# Patient Record
Sex: FEMALE | Race: WHITE | NOT HISPANIC OR LATINO | Employment: OTHER | URBAN - METROPOLITAN AREA
[De-identification: names, ages, dates, MRNs, and addresses within clinical notes are randomized per-mention and may not be internally consistent; named-entity substitution may affect disease eponyms.]

---

## 2017-02-18 ENCOUNTER — APPOINTMENT (EMERGENCY)
Dept: RADIOLOGY | Facility: HOSPITAL | Age: 82
End: 2017-02-18
Payer: MEDICARE

## 2017-02-18 ENCOUNTER — HOSPITAL ENCOUNTER (EMERGENCY)
Facility: HOSPITAL | Age: 82
Discharge: HOME | End: 2017-02-18
Admitting: EMERGENCY MEDICINE
Payer: MEDICARE

## 2017-02-18 VITALS
DIASTOLIC BLOOD PRESSURE: 67 MMHG | WEIGHT: 169.53 LBS | RESPIRATION RATE: 18 BRPM | SYSTOLIC BLOOD PRESSURE: 143 MMHG | OXYGEN SATURATION: 97 % | TEMPERATURE: 97 F | HEART RATE: 60 BPM

## 2017-02-18 DIAGNOSIS — G89.29 CHRONIC PAIN OF RIGHT KNEE: Primary | ICD-10-CM

## 2017-02-18 DIAGNOSIS — M25.561 CHRONIC PAIN OF RIGHT KNEE: Primary | ICD-10-CM

## 2017-02-18 PROCEDURE — 99283 EMERGENCY DEPT VISIT LOW MDM: CPT

## 2017-02-18 PROCEDURE — 73560 X-RAY EXAM OF KNEE 1 OR 2: CPT

## 2017-02-18 RX ORDER — CLOPIDOGREL BISULFATE 75 MG/1
75 TABLET ORAL DAILY
COMMUNITY

## 2017-02-18 RX ORDER — TRAMADOL HYDROCHLORIDE 50 MG/1
50 TABLET ORAL EVERY 6 HOURS PRN
COMMUNITY
End: 2020-01-26 | Stop reason: ALTCHOICE

## 2017-02-18 RX ORDER — ATORVASTATIN CALCIUM 10 MG/1
10 TABLET, FILM COATED ORAL DAILY
COMMUNITY

## 2017-02-18 RX ORDER — TOLTERODINE 4 MG/1
4 CAPSULE, EXTENDED RELEASE ORAL DAILY
COMMUNITY

## 2017-02-18 RX ORDER — LISINOPRIL 20 MG/1
20 TABLET ORAL DAILY
COMMUNITY

## 2017-02-18 RX ORDER — OMEPRAZOLE 40 MG/1
40 CAPSULE, DELAYED RELEASE ORAL DAILY
COMMUNITY

## 2017-03-18 ENCOUNTER — HOSPITAL ENCOUNTER (EMERGENCY)
Facility: HOSPITAL | Age: 82
Discharge: HOME | End: 2017-03-18
Attending: EMERGENCY MEDICINE | Admitting: EMERGENCY MEDICINE
Payer: COMMERCIAL

## 2017-03-18 VITALS
HEIGHT: 62 IN | RESPIRATION RATE: 16 BRPM | HEART RATE: 60 BPM | TEMPERATURE: 98.4 F | WEIGHT: 165.79 LBS | OXYGEN SATURATION: 99 % | DIASTOLIC BLOOD PRESSURE: 71 MMHG | SYSTOLIC BLOOD PRESSURE: 159 MMHG | BODY MASS INDEX: 30.51 KG/M2

## 2017-03-18 DIAGNOSIS — M17.11 ARTHRITIS OF RIGHT KNEE: Primary | ICD-10-CM

## 2017-03-18 PROCEDURE — 99283 EMERGENCY DEPT VISIT LOW MDM: CPT

## 2017-03-18 PROCEDURE — 96372 THER/PROPH/DIAG INJ SC/IM: CPT

## 2017-03-18 RX ORDER — KETOROLAC TROMETHAMINE 30 MG/ML
15 INJECTION, SOLUTION INTRAMUSCULAR; INTRAVENOUS ONCE
Status: COMPLETED | OUTPATIENT
Start: 2017-03-18 | End: 2017-03-18

## 2017-03-18 RX ORDER — PREDNISONE 20 MG/1
60 TABLET ORAL ONCE
Status: COMPLETED | OUTPATIENT
Start: 2017-03-18 | End: 2017-03-18

## 2017-03-18 RX ORDER — PREDNISONE 20 MG/1
40 TABLET ORAL DAILY
Qty: 10 TABLET | Refills: 0 | Status: SHIPPED | OUTPATIENT
Start: 2017-03-18 | End: 2017-03-23

## 2017-03-18 RX ADMIN — PREDNISONE 60 MG: 20 TABLET ORAL at 10:40

## 2017-03-18 RX ADMIN — KETOROLAC TROMETHAMINE 15 MG: 30 INJECTION, SOLUTION INTRAMUSCULAR at 10:39

## 2017-05-17 ENCOUNTER — HOSPITAL ENCOUNTER (EMERGENCY)
Facility: HOSPITAL | Age: 82
Discharge: HOME | End: 2017-05-17
Attending: EMERGENCY MEDICINE
Payer: COMMERCIAL

## 2017-05-17 VITALS
SYSTOLIC BLOOD PRESSURE: 125 MMHG | HEART RATE: 62 BPM | BODY MASS INDEX: 30.55 KG/M2 | RESPIRATION RATE: 18 BRPM | TEMPERATURE: 97.6 F | DIASTOLIC BLOOD PRESSURE: 75 MMHG | HEIGHT: 62 IN | WEIGHT: 166 LBS | OXYGEN SATURATION: 98 %

## 2017-05-17 DIAGNOSIS — M17.11 ARTHRITIS OF RIGHT KNEE: Primary | ICD-10-CM

## 2017-05-17 PROCEDURE — 99283 EMERGENCY DEPT VISIT LOW MDM: CPT

## 2017-05-17 PROCEDURE — 96372 THER/PROPH/DIAG INJ SC/IM: CPT

## 2017-05-17 RX ORDER — NAPROXEN 500 MG/1
500 TABLET ORAL 2 TIMES DAILY WITH MEALS
Qty: 14 TABLET | Refills: 0 | Status: SHIPPED | OUTPATIENT
Start: 2017-05-17 | End: 2017-05-24

## 2017-05-17 RX ORDER — KETOROLAC TROMETHAMINE 30 MG/ML
15 INJECTION, SOLUTION INTRAMUSCULAR; INTRAVENOUS ONCE
Status: COMPLETED | OUTPATIENT
Start: 2017-05-17 | End: 2017-05-17

## 2017-05-17 RX ADMIN — KETOROLAC TROMETHAMINE 15 MG: 30 INJECTION, SOLUTION INTRAMUSCULAR at 12:12

## 2017-05-17 RX ADMIN — HYDROMORPHONE HYDROCHLORIDE 0.2 MG: 1 INJECTION, SOLUTION INTRAMUSCULAR; INTRAVENOUS; SUBCUTANEOUS at 12:13

## 2017-06-09 ENCOUNTER — ALLSCRIPTS OFFICE VISIT (OUTPATIENT)
Dept: OTHER | Facility: OTHER | Age: 82
End: 2017-06-09

## 2017-08-18 ENCOUNTER — HOSPITAL ENCOUNTER (EMERGENCY)
Facility: HOSPITAL | Age: 82
Discharge: HOME | End: 2017-08-18
Attending: EMERGENCY MEDICINE | Admitting: EMERGENCY MEDICINE
Payer: COMMERCIAL

## 2017-08-18 ENCOUNTER — APPOINTMENT (EMERGENCY)
Dept: CT IMAGING | Facility: HOSPITAL | Age: 82
End: 2017-08-18
Payer: COMMERCIAL

## 2017-08-18 VITALS
DIASTOLIC BLOOD PRESSURE: 91 MMHG | WEIGHT: 164.68 LBS | TEMPERATURE: 98.4 F | RESPIRATION RATE: 20 BRPM | SYSTOLIC BLOOD PRESSURE: 194 MMHG | BODY MASS INDEX: 30.12 KG/M2 | HEART RATE: 70 BPM | OXYGEN SATURATION: 95 %

## 2017-08-18 DIAGNOSIS — K80.20 CHOLELITHIASIS: ICD-10-CM

## 2017-08-18 DIAGNOSIS — R10.9 ABDOMINAL PAIN: Primary | ICD-10-CM

## 2017-08-18 DIAGNOSIS — N28.1 BILATERAL RENAL CYSTS: ICD-10-CM

## 2017-08-18 LAB
ALBUMIN SERPL BCP-MCNC: 3.4 G/DL (ref 3.5–5)
ALP SERPL-CCNC: 71 U/L (ref 46–116)
ALT SERPL W P-5'-P-CCNC: 17 U/L (ref 12–78)
ANION GAP SERPL CALCULATED.3IONS-SCNC: 9 MMOL/L (ref 4–13)
AST SERPL W P-5'-P-CCNC: 14 U/L (ref 5–45)
BACTERIA UR QL AUTO: ABNORMAL /HPF
BASOPHILS # BLD AUTO: 0.05 THOUSANDS/ΜL (ref 0–0.1)
BASOPHILS NFR BLD AUTO: 1 % (ref 0–1)
BILIRUB SERPL-MCNC: 0.2 MG/DL (ref 0.2–1)
BILIRUB UR QL STRIP: NEGATIVE
BUN SERPL-MCNC: 13 MG/DL (ref 5–25)
CALCIUM SERPL-MCNC: 9.7 MG/DL (ref 8.3–10.1)
CHLORIDE SERPL-SCNC: 106 MMOL/L (ref 100–108)
CLARITY UR: CLEAR
CLARITY, POC: CLEAR
CO2 SERPL-SCNC: 30 MMOL/L (ref 21–32)
COLOR UR: COLORLESS
COLOR, POC: CLEAR
CREAT SERPL-MCNC: 0.74 MG/DL (ref 0.6–1.3)
EOSINOPHIL # BLD AUTO: 0.06 THOUSAND/ΜL (ref 0–0.61)
EOSINOPHIL NFR BLD AUTO: 1 % (ref 0–6)
ERYTHROCYTE [DISTWIDTH] IN BLOOD BY AUTOMATED COUNT: 12.4 % (ref 11.6–15.1)
EXT BILIRUBIN, UA: NEGATIVE
EXT BLOOD URINE: NORMAL
EXT GLUCOSE, UA: NEGATIVE
EXT KETONES: NEGATIVE
EXT NITRITE, UA: NEGATIVE
EXT PH, UA: 8
EXT PROTEIN, UA: NEGATIVE
EXT SPECIFIC GRAVITY, UA: 1
EXT UROBILINOGEN: 0.2
GFR SERPL CREATININE-BSD FRML MDRD: 76 ML/MIN/1.73SQ M
GLUCOSE SERPL-MCNC: 98 MG/DL (ref 65–140)
GLUCOSE UR STRIP-MCNC: NEGATIVE MG/DL
HCT VFR BLD AUTO: 41.1 % (ref 34.8–46.1)
HGB BLD-MCNC: 13.5 G/DL (ref 11.5–15.4)
HGB UR QL STRIP.AUTO: ABNORMAL
KETONES UR STRIP-MCNC: NEGATIVE MG/DL
LEUKOCYTE ESTERASE UR QL STRIP: ABNORMAL
LIPASE SERPL-CCNC: 173 U/L (ref 73–393)
LYMPHOCYTES # BLD AUTO: 1.96 THOUSANDS/ΜL (ref 0.6–4.47)
LYMPHOCYTES NFR BLD AUTO: 25 % (ref 14–44)
MCH RBC QN AUTO: 32 PG (ref 26.8–34.3)
MCHC RBC AUTO-ENTMCNC: 32.8 G/DL (ref 31.4–37.4)
MCV RBC AUTO: 97 FL (ref 82–98)
MONOCYTES # BLD AUTO: 0.58 THOUSAND/ΜL (ref 0.17–1.22)
MONOCYTES NFR BLD AUTO: 7 % (ref 4–12)
NEUTROPHILS # BLD AUTO: 5.26 THOUSANDS/ΜL (ref 1.85–7.62)
NEUTS SEG NFR BLD AUTO: 66 % (ref 43–75)
NITRITE UR QL STRIP: NEGATIVE
NON-SQ EPI CELLS URNS QL MICRO: ABNORMAL /HPF
NRBC BLD AUTO-RTO: 0 /100 WBCS
PH UR STRIP.AUTO: 7.5 [PH] (ref 4.5–8)
PLATELET # BLD AUTO: 257 THOUSANDS/UL (ref 149–390)
PMV BLD AUTO: 10.1 FL (ref 8.9–12.7)
POTASSIUM SERPL-SCNC: 3.7 MMOL/L (ref 3.5–5.3)
PROT SERPL-MCNC: 7.7 G/DL (ref 6.4–8.2)
PROT UR STRIP-MCNC: NEGATIVE MG/DL
RBC # BLD AUTO: 4.22 MILLION/UL (ref 3.81–5.12)
RBC #/AREA URNS AUTO: ABNORMAL /HPF
SODIUM SERPL-SCNC: 145 MMOL/L (ref 136–145)
SP GR UR STRIP.AUTO: 1.01 (ref 1–1.03)
TROPONIN I SERPL-MCNC: <0.02 NG/ML
UROBILINOGEN UR QL STRIP.AUTO: 0.2 E.U./DL
WBC # BLD AUTO: 7.93 THOUSAND/UL (ref 4.31–10.16)
WBC # BLD EST: NORMAL 10*3/UL
WBC #/AREA URNS AUTO: ABNORMAL /HPF

## 2017-08-18 PROCEDURE — 81001 URINALYSIS AUTO W/SCOPE: CPT | Performed by: EMERGENCY MEDICINE

## 2017-08-18 PROCEDURE — 93005 ELECTROCARDIOGRAM TRACING: CPT | Performed by: EMERGENCY MEDICINE

## 2017-08-18 PROCEDURE — 85025 COMPLETE CBC W/AUTO DIFF WBC: CPT | Performed by: EMERGENCY MEDICINE

## 2017-08-18 PROCEDURE — 99284 EMERGENCY DEPT VISIT MOD MDM: CPT

## 2017-08-18 PROCEDURE — 80053 COMPREHEN METABOLIC PANEL: CPT | Performed by: EMERGENCY MEDICINE

## 2017-08-18 PROCEDURE — 83690 ASSAY OF LIPASE: CPT | Performed by: EMERGENCY MEDICINE

## 2017-08-18 PROCEDURE — 81002 URINALYSIS NONAUTO W/O SCOPE: CPT | Performed by: EMERGENCY MEDICINE

## 2017-08-18 PROCEDURE — 84484 ASSAY OF TROPONIN QUANT: CPT | Performed by: EMERGENCY MEDICINE

## 2017-08-18 PROCEDURE — 36415 COLL VENOUS BLD VENIPUNCTURE: CPT | Performed by: EMERGENCY MEDICINE

## 2017-08-18 PROCEDURE — 96361 HYDRATE IV INFUSION ADD-ON: CPT

## 2017-08-18 PROCEDURE — 96374 THER/PROPH/DIAG INJ IV PUSH: CPT

## 2017-08-18 PROCEDURE — 74177 CT ABD & PELVIS W/CONTRAST: CPT

## 2017-08-18 PROCEDURE — 96375 TX/PRO/DX INJ NEW DRUG ADDON: CPT

## 2017-08-18 RX ORDER — FENTANYL CITRATE 50 UG/ML
25 INJECTION, SOLUTION INTRAMUSCULAR; INTRAVENOUS ONCE
Status: COMPLETED | OUTPATIENT
Start: 2017-08-18 | End: 2017-08-18

## 2017-08-18 RX ORDER — ONDANSETRON 2 MG/ML
4 INJECTION INTRAMUSCULAR; INTRAVENOUS ONCE
Status: COMPLETED | OUTPATIENT
Start: 2017-08-18 | End: 2017-08-18

## 2017-08-18 RX ADMIN — ONDANSETRON 4 MG: 2 INJECTION INTRAMUSCULAR; INTRAVENOUS at 11:58

## 2017-08-18 RX ADMIN — IOHEXOL 100 ML: 350 INJECTION, SOLUTION INTRAVENOUS at 12:45

## 2017-08-18 RX ADMIN — FENTANYL CITRATE 25 MCG: 50 INJECTION INTRAMUSCULAR; INTRAVENOUS at 11:58

## 2017-08-18 RX ADMIN — SODIUM CHLORIDE 1000 ML: 0.9 INJECTION, SOLUTION INTRAVENOUS at 11:56

## 2017-08-22 LAB
ATRIAL RATE: 57 BPM
P AXIS: 1 DEGREES
PR INTERVAL: 130 MS
QRS AXIS: 12 DEGREES
QRSD INTERVAL: 76 MS
QT INTERVAL: 404 MS
QTC INTERVAL: 393 MS
T WAVE AXIS: 33 DEGREES
VENTRICULAR RATE: 57 BPM

## 2017-09-13 ENCOUNTER — TRANSCRIBE ORDERS (OUTPATIENT)
Dept: ADMINISTRATIVE | Facility: HOSPITAL | Age: 82
End: 2017-09-13

## 2017-09-13 DIAGNOSIS — N28.1 ACQUIRED CYST OF KIDNEY: Primary | ICD-10-CM

## 2017-09-25 ENCOUNTER — HOSPITAL ENCOUNTER (OUTPATIENT)
Dept: ULTRASOUND IMAGING | Facility: HOSPITAL | Age: 82
Discharge: HOME | End: 2017-09-25
Payer: COMMERCIAL

## 2017-09-25 DIAGNOSIS — N28.1 ACQUIRED CYST OF KIDNEY: ICD-10-CM

## 2017-09-25 PROCEDURE — 76770 US EXAM ABDO BACK WALL COMP: CPT

## 2017-12-29 ENCOUNTER — APPOINTMENT (EMERGENCY)
Dept: RADIOLOGY | Facility: HOSPITAL | Age: 82
End: 2017-12-29
Payer: COMMERCIAL

## 2017-12-29 ENCOUNTER — APPOINTMENT (EMERGENCY)
Dept: ULTRASOUND IMAGING | Facility: HOSPITAL | Age: 82
End: 2017-12-29
Payer: COMMERCIAL

## 2017-12-29 ENCOUNTER — HOSPITAL ENCOUNTER (EMERGENCY)
Facility: HOSPITAL | Age: 82
Discharge: HOME | End: 2017-12-29
Attending: EMERGENCY MEDICINE | Admitting: EMERGENCY MEDICINE
Payer: COMMERCIAL

## 2017-12-29 VITALS
SYSTOLIC BLOOD PRESSURE: 120 MMHG | HEIGHT: 62 IN | TEMPERATURE: 97 F | RESPIRATION RATE: 18 BRPM | OXYGEN SATURATION: 96 % | DIASTOLIC BLOOD PRESSURE: 55 MMHG | BODY MASS INDEX: 29.44 KG/M2 | HEART RATE: 70 BPM | WEIGHT: 160 LBS

## 2017-12-29 DIAGNOSIS — M25.569 KNEE PAIN: Primary | ICD-10-CM

## 2017-12-29 PROCEDURE — 99284 EMERGENCY DEPT VISIT MOD MDM: CPT

## 2017-12-29 PROCEDURE — 73502 X-RAY EXAM HIP UNI 2-3 VIEWS: CPT

## 2017-12-29 PROCEDURE — 93971 EXTREMITY STUDY: CPT

## 2017-12-29 PROCEDURE — 73564 X-RAY EXAM KNEE 4 OR MORE: CPT

## 2017-12-29 RX ORDER — ACETAMINOPHEN 325 MG/1
650 TABLET ORAL ONCE
Status: COMPLETED | OUTPATIENT
Start: 2017-12-29 | End: 2017-12-29

## 2017-12-29 RX ORDER — IBUPROFEN 400 MG/1
400 TABLET ORAL ONCE
Status: COMPLETED | OUTPATIENT
Start: 2017-12-29 | End: 2017-12-29

## 2017-12-29 RX ADMIN — ACETAMINOPHEN 650 MG: 325 TABLET ORAL at 14:13

## 2017-12-29 RX ADMIN — IBUPROFEN 400 MG: 400 TABLET, FILM COATED ORAL at 14:13

## 2017-12-29 NOTE — DISCHARGE INSTRUCTIONS
Make sure you call the orthopedic surgeon for follow-up  Knee Pain   WHAT YOU NEED TO KNOW:   Knee pain may start suddenly, or it may be a long-term problem  You may have pain on the side, front, or back of your knee  You may have knee stiffness and swelling  You may hear popping sounds or feel like your knee is giving way or locking up as you walk  You may feel pain when you sit, stand, walk, or climb up and down stairs  Knee pain can be caused by conditions such as obesity, inflammation, or strains or tears in ligaments or tendons  DISCHARGE INSTRUCTIONS:   Follow up with your healthcare provider within 24 hours or as directed: You may need follow-up treatments, such as steroid injections to decrease pain  Write down your questions so you remember to ask them during your visits  Self-care:   · Rest  your knee so it can heal  Limit activities that increase your pain  · Ice  can help reduce swelling  Wrap ice in a towel and put it on your knee for as long and as often as directed  · Compression  with a brace or bandage can help reduce swelling  Use a brace or bandage only as directed  · Elevation  helps decrease pain and swelling  Elevate your knee while you are sitting or lying down  Prop your leg on pillows to keep your knee above the level of your heart  Medicines:   · NSAIDs  help decrease swelling and pain or fever  This medicine is available with or without a doctor's order  NSAIDs can cause stomach bleeding or kidney problems in certain people  If you take blood thinner medicine, always ask your healthcare provider if NSAIDs are safe for you  Always read the medicine label and follow directions  · Acetaminophen  decreases pain and fever  It is available without a doctor's order  Ask how much to take and when to take it  Follow directions  Acetaminophen can cause liver damage if not taken correctly  · Take your medicine as directed    Contact your healthcare provider if you think your medicine is not helping or if you have side effects  Tell him or her if you are allergic to any medicine  Keep a list of the medicines, vitamins, and herbs you take  Include the amounts, and when and why you take them  Bring the list or the pill bottles to follow-up visits  Carry your medicine list with you in case of an emergency  Exercise as directed: You may need to see a physical therapist or do recommended exercises to improve movement and decrease your pain  You may be directed to walk, swim, or ride a bike  Follow your exercise plan exactly as directed to avoid further injury  Contact your healthcare provider if:   · You have questions or concerns about your condition or care  Return to the emergency department if:   · Your pain is worse, even after treatment  · You cannot bend or straighten your leg completely  · The swelling around your knee does not go down even with treatment  · Your knee is painful and hot to the touch  © 2017 [address] Information is for End User's use only and may not be sold, redistributed or otherwise used for commercial purposes  All illustrations and images included in CareNotes® are the copyrighted property of [employer]  or Jose Correa  The above information is an  only  It is not intended as medical advice for individual conditions or treatments  Talk to your doctor, nurse or pharmacist before following any medical regimen to see if it is safe and effective for you

## 2017-12-29 NOTE — ED PROVIDER NOTES
History  Chief Complaint   Patient presents with    Knee Pain     pt comes to ed for knee pain x 2 months  Pt states she has  had pain since her knee replacement two months ago and couldnt stand it anymore  Patient is an 67-year-old female here with has been who states for the past 2 months ever since her knee surgery she has been having the pain  Patient had a total knee replacement done in the [facility] area  Patient was on tramadol for her pain and took 1 last night  Patient has had no fevers, chest pain, shortness of breath,   Did not fall or hurt herself  Patient states the knee is just "painful"  Patient has followed up with the surgeon and has been complaining physical therapy  She has no back pain, hip pain, numbness or tingling down the leg  She has had no difficulty with eating or drinking  She has no urinary tension  History provided by:  Patient and significant other   used: No    Knee Pain   Location:  Knee  Time since incident:  2 months  Injury: no    Knee location:  R knee  Pain details:     Quality:  Cramping and aching    Radiates to:  Does not radiate    Severity:  Mild    Onset quality:  Gradual    Duration:  2 months    Timing:  Constant    Progression:  Waxing and waning  Chronicity:  Recurrent  Dislocation: no    Foreign body present:  No foreign bodies  Tetanus status:  Unknown  Prior injury to area:  No  Relieved by:  None tried  Exacerbated by: Movement  Ineffective treatments:  NSAIDs (Tramadol)  Associated symptoms: no back pain, no decreased ROM, no fatigue, no fever, no itching, no muscle weakness, no neck pain, no numbness, no stiffness, no swelling and no tingling    Risk factors: no concern for non-accidental trauma, no frequent fractures, no known bone disorder, no obesity and no recent illness        Prior to Admission Medications   Prescriptions Last Dose Informant Patient Reported?  Taking?   atorvastatin (LIPITOR) 10 mg tablet   Yes No Sig: Take 10 mg by mouth daily   clopidogrel (PLAVIX) 75 mg tablet   Yes No   Sig: Take 75 mg by mouth daily   lisinopril (ZESTRIL) 20 mg tablet   Yes No   Sig: Take 20 mg by mouth daily   naproxen (NAPROSYN) 500 mg tablet   No No   Sig: Take 1 tablet by mouth 2 (two) times a day with meals for 7 days   omeprazole (PriLOSEC) 40 MG capsule   Yes No   Sig: Take 40 mg by mouth daily   tolterodine (DETROL LA) 4 mg 24 hr capsule   Yes No   Sig: Take 4 mg by mouth daily   traMADol (ULTRAM) 50 mg tablet   Yes No   Sig: Take 50 mg by mouth every 6 (six) hours as needed for moderate pain      Facility-Administered Medications: None       Past Medical History:   Diagnosis Date    CHF (congestive heart failure) (HCC)     Hyperlipidemia     Hypertension     Knee pain     right       Past Surgical History:   Procedure Laterality Date    BACK SURGERY         History reviewed  No pertinent family history  I have reviewed and agree with the history as documented  Social History   Substance Use Topics    Smoking status: Never Smoker    Smokeless tobacco: Never Used    Alcohol use No        Review of Systems   Constitutional: Negative for fatigue and fever  Respiratory: Negative for chest tightness and shortness of breath  Cardiovascular: Negative for chest pain, palpitations and leg swelling  Musculoskeletal: Negative for arthralgias, back pain, myalgias, neck pain and stiffness  Skin: Negative for color change and itching         Physical Exam  ED Triage Vitals [12/29/17 1218]   Temperature Pulse Respirations Blood Pressure SpO2   (!) 97 °F (36 1 °C) 70 18 120/55 96 %      Temp Source Heart Rate Source Patient Position - Orthostatic VS BP Location FiO2 (%)   Temporal -- Sitting Right arm --      Pain Score       --           Orthostatic Vital Signs  Vitals:    12/29/17 1218   BP: 120/55   Pulse: 70   Patient Position - Orthostatic VS: Sitting       Physical Exam   Constitutional: She is oriented to person, place, and time  She appears well-developed and well-nourished  No distress  HENT:   Head: Normocephalic  Eyes: EOM are normal  Pupils are equal, round, and reactive to light  Neck: Normal range of motion  Neck supple  Pulmonary/Chest: No respiratory distress  Musculoskeletal:        Right knee: She exhibits normal range of motion, no swelling, no effusion, no ecchymosis, no deformity, no laceration, no erythema, normal alignment, no LCL laxity, normal patellar mobility, no bony tenderness, normal meniscus and no MCL laxity  Tenderness found  Medial joint line tenderness noted  No lateral joint line, no MCL, no LCL and no patellar tendon tenderness noted  Legs:  Patient has full active range of motion of the right knee, hip, and ankle  This was pain free  Patient has mild swelling to the right knee however there is no patellar suprapatellar edema noted  There is no effusions noted  Total knee replacement incision well-healing  I cannot reproduce patient knee discomfort/pain  Neurological: She is alert and oriented to person, place, and time  Skin: Skin is warm  Capillary refill takes less than 2 seconds  No rash noted  She is diaphoretic  No erythema  No pallor  Nursing note and vitals reviewed  ED Medications  Medications   acetaminophen (TYLENOL) tablet 650 mg (650 mg Oral Given 12/29/17 1413)   ibuprofen (MOTRIN) tablet 400 mg (400 mg Oral Given 12/29/17 1413)       Diagnostic Studies  Results Reviewed     None                 XR hip/pelv 2-3 vws right if performed   Final Result by Leonard Paz MD (12/29 8204)      No acute osseous abnormality  Workstation performed: [id number]         XR knee 4+ views RIGHT   Final Result by Leonard Paz MD (12/29 4780)   Unremarkable total right knee arthroplasty  No acute osseous abnormality           Workstation performed: [id number]         VAS lower limb venous duplex study, unilateral/limited    (Results Pending) Procedures  Procedures       Phone Contacts  ED Phone Contact    ED Course  ED Course                                MDM  Number of Diagnoses or Management Options  Knee pain: new and requires workup  Diagnosis management comments: Patient is an 67-year-old female with a history of hypertension hyperlipidemia CHF coming in today for persisted a pain after total knee replacement 2 months ago  Patient has not had reoccurred falls  She states that she did take a tramadol last night without any change in pain  Will obtain x-rays of hip and knee as well as a vascular study given recent surgery  On physical exam patient does not have an effusion and has full active range of motion    3:19 PM  US negative for DVT   Patient and  wished to leave as he cannot drive in the dark  Review of x-rays on my read show no acute pathology however they are where the radiologist not is not red these  He still wished to leave  Patient and family asking for something stronger than tramadol  I discussed with him that this is a narcotic and will need to follow up with Orthopedic surgery as her pain started approximately 2 months ago after the surgery  Patient has no clinical signs and symptoms of septic joint, hemorrhagic contusion  She has no DVT, no fractures on my read  Patient is neurovascular intact and will need close follow-up  Amount and/or Complexity of Data Reviewed  Tests in the radiology section of CPT®: ordered and reviewed  Independent visualization of images, tracings, or specimens: yes (Per ultrasound tech negative DVT    Per x-rays on my read no acute pathology )      CritCare Time    Disposition  Final diagnoses:   Knee pain     Time reflects when diagnosis was documented in both MDM as applicable and the Disposition within this note     Time User Action Codes Description Comment    12/29/2017  3:21 PM Neil Michael Add [F73 [id number]] Knee pain       ED Disposition     ED Disposition Condition Comment    Discharge  Vinh Webster discharge to home/self care  Condition at discharge: Stable        Follow-up Information     Follow up With Specialties Details Why [address], [place]  Schedule an appointment as soon as possible for a visit  [address] [address] [facility] [address]  [phone]          Discharge Medication List as of 12/29/2017  3:23 PM      CONTINUE these medications which have NOT CHANGED    Details   atorvastatin (LIPITOR) 10 mg tablet Take 10 mg by mouth daily, Until Discontinued, Historical Med      clopidogrel (PLAVIX) 75 mg tablet Take 75 mg by mouth daily, Until Discontinued, Historical Med      lisinopril (ZESTRIL) 20 mg tablet Take 20 mg by mouth daily, Until Discontinued, Historical Med      naproxen (NAPROSYN) 500 mg tablet Take 1 tablet by mouth 2 (two) times a day with meals for 7 days, Starting 5/17/2017, Until Wed 5/24/17, Print      omeprazole (PriLOSEC) 40 MG capsule Take 40 mg by mouth daily, Until Discontinued, Historical Med      tolterodine (DETROL LA) 4 mg 24 hr capsule Take 4 mg by mouth daily, Until Discontinued, Historical Med      traMADol (ULTRAM) 50 mg tablet Take 50 mg by mouth every 6 (six) hours as needed for moderate pain, Until Discontinued, Historical Med           No discharge procedures on file      ED Provider  Electronically Signed by           Jim Zambrano DO  12/29/17 9763

## 2018-01-14 VITALS
HEIGHT: 62 IN | SYSTOLIC BLOOD PRESSURE: 127 MMHG | DIASTOLIC BLOOD PRESSURE: 78 MMHG | HEART RATE: 50 BPM | WEIGHT: 166.13 LBS | BODY MASS INDEX: 30.57 KG/M2

## 2018-05-16 ENCOUNTER — APPOINTMENT (EMERGENCY)
Dept: RADIOLOGY | Facility: HOSPITAL | Age: 83
End: 2018-05-16
Payer: COMMERCIAL

## 2018-05-16 ENCOUNTER — HOSPITAL ENCOUNTER (EMERGENCY)
Facility: HOSPITAL | Age: 83
Discharge: HOME | End: 2018-05-17
Attending: EMERGENCY MEDICINE
Payer: COMMERCIAL

## 2018-05-16 VITALS
RESPIRATION RATE: 16 BRPM | SYSTOLIC BLOOD PRESSURE: 173 MMHG | OXYGEN SATURATION: 98 % | DIASTOLIC BLOOD PRESSURE: 80 MMHG | HEART RATE: 63 BPM

## 2018-05-16 DIAGNOSIS — M25.511 RIGHT SHOULDER PAIN: Primary | ICD-10-CM

## 2018-05-16 LAB
ALBUMIN SERPL BCP-MCNC: 3.6 G/DL (ref 3.5–5)
ALP SERPL-CCNC: 81 U/L (ref 46–116)
ALT SERPL W P-5'-P-CCNC: 17 U/L (ref 12–78)
ANION GAP SERPL CALCULATED.3IONS-SCNC: 8 MMOL/L (ref 4–13)
APTT PPP: 29 SECONDS (ref 24–36)
AST SERPL W P-5'-P-CCNC: 18 U/L (ref 5–45)
BASOPHILS # BLD AUTO: 0.05 THOUSANDS/ΜL (ref 0–0.1)
BASOPHILS NFR BLD AUTO: 0 % (ref 0–1)
BILIRUB DIRECT SERPL-MCNC: 0.12 MG/DL (ref 0–0.2)
BILIRUB SERPL-MCNC: 0.6 MG/DL (ref 0.2–1)
BUN SERPL-MCNC: 19 MG/DL (ref 5–25)
CALCIUM SERPL-MCNC: 9.6 MG/DL (ref 8.3–10.1)
CHLORIDE SERPL-SCNC: 101 MMOL/L (ref 100–108)
CO2 SERPL-SCNC: 31 MMOL/L (ref 21–32)
CREAT SERPL-MCNC: 0.83 MG/DL (ref 0.6–1.3)
EOSINOPHIL # BLD AUTO: 0.04 THOUSAND/ΜL (ref 0–0.61)
EOSINOPHIL NFR BLD AUTO: 0 % (ref 0–6)
ERYTHROCYTE [DISTWIDTH] IN BLOOD BY AUTOMATED COUNT: 13 % (ref 11.6–15.1)
GFR SERPL CREATININE-BSD FRML MDRD: 66 ML/MIN/1.73SQ M
GLUCOSE SERPL-MCNC: 98 MG/DL (ref 65–140)
HCT VFR BLD AUTO: 40.3 % (ref 34.8–46.1)
HGB BLD-MCNC: 13.2 G/DL (ref 11.5–15.4)
IMM GRANULOCYTES # BLD AUTO: 0.06 THOUSAND/UL (ref 0–0.2)
IMM GRANULOCYTES NFR BLD AUTO: 1 % (ref 0–2)
INR PPP: 1.11 (ref 0.86–1.16)
LYMPHOCYTES # BLD AUTO: 2.61 THOUSANDS/ΜL (ref 0.6–4.47)
LYMPHOCYTES NFR BLD AUTO: 22 % (ref 14–44)
MCH RBC QN AUTO: 31.7 PG (ref 26.8–34.3)
MCHC RBC AUTO-ENTMCNC: 32.8 G/DL (ref 31.4–37.4)
MCV RBC AUTO: 97 FL (ref 82–98)
MONOCYTES # BLD AUTO: 0.94 THOUSAND/ΜL (ref 0.17–1.22)
MONOCYTES NFR BLD AUTO: 8 % (ref 4–12)
NEUTROPHILS # BLD AUTO: 7.99 THOUSANDS/ΜL (ref 1.85–7.62)
NEUTS SEG NFR BLD AUTO: 69 % (ref 43–75)
NRBC BLD AUTO-RTO: 0 /100 WBCS
PLATELET # BLD AUTO: 266 THOUSANDS/UL (ref 149–390)
PMV BLD AUTO: 10 FL (ref 8.9–12.7)
POTASSIUM SERPL-SCNC: 3.4 MMOL/L (ref 3.5–5.3)
PROT SERPL-MCNC: 7.9 G/DL (ref 6.4–8.2)
PROTHROMBIN TIME: 14.6 SECONDS (ref 11.8–14.2)
RBC # BLD AUTO: 4.16 MILLION/UL (ref 3.81–5.12)
SODIUM SERPL-SCNC: 140 MMOL/L (ref 136–145)
TROPONIN I SERPL-MCNC: <0.02 NG/ML
WBC # BLD AUTO: 11.69 THOUSAND/UL (ref 4.31–10.16)

## 2018-05-16 PROCEDURE — 85610 PROTHROMBIN TIME: CPT | Performed by: EMERGENCY MEDICINE

## 2018-05-16 PROCEDURE — 96361 HYDRATE IV INFUSION ADD-ON: CPT

## 2018-05-16 PROCEDURE — 36415 COLL VENOUS BLD VENIPUNCTURE: CPT | Performed by: EMERGENCY MEDICINE

## 2018-05-16 PROCEDURE — 84484 ASSAY OF TROPONIN QUANT: CPT | Performed by: EMERGENCY MEDICINE

## 2018-05-16 PROCEDURE — 73030 X-RAY EXAM OF SHOULDER: CPT

## 2018-05-16 PROCEDURE — 96374 THER/PROPH/DIAG INJ IV PUSH: CPT

## 2018-05-16 PROCEDURE — 93005 ELECTROCARDIOGRAM TRACING: CPT

## 2018-05-16 PROCEDURE — 71046 X-RAY EXAM CHEST 2 VIEWS: CPT

## 2018-05-16 PROCEDURE — 85730 THROMBOPLASTIN TIME PARTIAL: CPT | Performed by: EMERGENCY MEDICINE

## 2018-05-16 PROCEDURE — 80076 HEPATIC FUNCTION PANEL: CPT | Performed by: EMERGENCY MEDICINE

## 2018-05-16 PROCEDURE — 80048 BASIC METABOLIC PNL TOTAL CA: CPT | Performed by: EMERGENCY MEDICINE

## 2018-05-16 PROCEDURE — 85025 COMPLETE CBC W/AUTO DIFF WBC: CPT | Performed by: EMERGENCY MEDICINE

## 2018-05-16 RX ORDER — KETOROLAC TROMETHAMINE 30 MG/ML
15 INJECTION, SOLUTION INTRAMUSCULAR; INTRAVENOUS ONCE
Status: COMPLETED | OUTPATIENT
Start: 2018-05-16 | End: 2018-05-16

## 2018-05-16 RX ORDER — SODIUM CHLORIDE 9 MG/ML
125 INJECTION, SOLUTION INTRAVENOUS CONTINUOUS
Status: DISCONTINUED | OUTPATIENT
Start: 2018-05-16 | End: 2018-05-17 | Stop reason: HOSPADM

## 2018-05-16 RX ORDER — ASPIRIN 81 MG/1
324 TABLET, CHEWABLE ORAL ONCE
Status: COMPLETED | OUTPATIENT
Start: 2018-05-16 | End: 2018-05-16

## 2018-05-16 RX ORDER — NAPROXEN 500 MG/1
500 TABLET ORAL 2 TIMES DAILY WITH MEALS
Qty: 20 TABLET | Refills: 0 | Status: SHIPPED | OUTPATIENT
Start: 2018-05-16 | End: 2020-01-26 | Stop reason: ALTCHOICE

## 2018-05-16 RX ADMIN — KETOROLAC TROMETHAMINE 15 MG: 30 INJECTION, SOLUTION INTRAMUSCULAR at 19:03

## 2018-05-16 RX ADMIN — ASPIRIN 81 MG 324 MG: 81 TABLET ORAL at 19:03

## 2018-05-16 RX ADMIN — SODIUM CHLORIDE 125 ML/HR: 0.9 INJECTION, SOLUTION INTRAVENOUS at 19:08

## 2018-05-16 NOTE — ED PROVIDER NOTES
History  Chief Complaint   Patient presents with    Shoulder Pain     EMS stated that patient has had right shoulder pain since saturday, and right knee pain for 2 weeks     HPI   81-year-old white female with a chief complaint of right shoulder pain  Patient states she started with pain in her right shoulder blade after she got got home from mother's Day dinner  Patient denies any chest pain, shortness of breath, or pain down her arm  Patient denies any trauma patient denies lifting anything heavy  Patient has a history of hyperlipidemia and hypertension  Patient also has a history of CHF  Patient states pain is sharp and intermittent in nature  Patient has full range of motion in her shoulder and right arm  Prior to Admission Medications   Prescriptions Last Dose Informant Patient Reported? Taking?   atorvastatin (LIPITOR) 10 mg tablet   Yes No   Sig: Take 10 mg by mouth daily   clopidogrel (PLAVIX) 75 mg tablet   Yes No   Sig: Take 75 mg by mouth daily   lisinopril (ZESTRIL) 20 mg tablet   Yes No   Sig: Take 20 mg by mouth daily   naproxen (NAPROSYN) 500 mg tablet   No No   Sig: Take 1 tablet by mouth 2 (two) times a day with meals for 7 days   omeprazole (PriLOSEC) 40 MG capsule   Yes No   Sig: Take 40 mg by mouth daily   tolterodine (DETROL LA) 4 mg 24 hr capsule   Yes No   Sig: Take 4 mg by mouth daily   traMADol (ULTRAM) 50 mg tablet   Yes No   Sig: Take 50 mg by mouth every 6 (six) hours as needed for moderate pain      Facility-Administered Medications: None       Past Medical History:   Diagnosis Date    CHF (congestive heart failure) (HCC)     Hyperlipidemia     Hypertension     Knee pain     right       Past Surgical History:   Procedure Laterality Date    BACK SURGERY         History reviewed  No pertinent family history  I have reviewed and agree with the history as documented      Social History   Substance Use Topics    Smoking status: Never Smoker    Smokeless tobacco: Never Used  Alcohol use No        Review of Systems   Constitutional: Negative for chills, diaphoresis, fatigue and fever  HENT: Negative for congestion, ear pain, nosebleeds, rhinorrhea and sore throat  Eyes: Negative for photophobia, pain, discharge and visual disturbance  Respiratory: Negative for cough, choking, chest tightness, shortness of breath and wheezing  Cardiovascular: Negative for chest pain and palpitations  Gastrointestinal: Negative for abdominal distention, abdominal pain, diarrhea and vomiting  Endocrine: Negative for polydipsia and polyuria  Genitourinary: Negative for dysuria, flank pain, frequency and hematuria  Musculoskeletal: Positive for arthralgias and myalgias  Negative for back pain, gait problem, joint swelling and neck stiffness  Skin: Negative for color change, rash and wound  Neurological: Negative for dizziness, syncope and headaches  Psychiatric/Behavioral: Negative for behavioral problems, confusion and suicidal ideas  The patient is not nervous/anxious  All other systems reviewed and are negative  Physical Exam  ED Triage Vitals   Temp Pulse Respirations Blood Pressure SpO2   -- 05/16/18 1813 05/16/18 1813 05/16/18 1813 05/16/18 1813    78 20 145/85 100 %      Temp src Heart Rate Source Patient Position - Orthostatic VS BP Location FiO2 (%)   -- 05/16/18 2048 05/16/18 1813 05/16/18 1813 --    Monitor Lying Right arm       Pain Score       05/16/18 2048       No Pain           Orthostatic Vital Signs  Vitals:    05/16/18 1813 05/16/18 1815 05/16/18 2048   BP: 145/85 145/85 (!) 173/80   Pulse: 78  63   Patient Position - Orthostatic VS: Lying  Lying       Physical Exam   Constitutional: She is oriented to person, place, and time  She appears well-developed and well-nourished  67-year-old white female with a chief complaint of right posterior scapular pain and right shoulder pain  HENT:   Head: Normocephalic and atraumatic     Mouth/Throat: Oropharynx is clear and moist    Eyes: EOM are normal  Pupils are equal, round, and reactive to light  Neck: Normal range of motion  Neck supple  Cardiovascular: Normal rate, regular rhythm and normal heart sounds  Pulmonary/Chest: Effort normal and breath sounds normal  No respiratory distress  She has no wheezes  She exhibits no tenderness  Abdominal: Soft  Bowel sounds are normal  There is no tenderness  Musculoskeletal: Normal range of motion  Right shoulder: There is some tenderness to the right anterior shoulder however most of her tenderness is in the suprascapular region of the back in the trapezius region on palpation  Patient has full range of motion of her shoulder in flexion, extension, abduction, and adduction  There is no numbness or tingling down her right arm  Neurological: She is alert and oriented to person, place, and time  No cranial nerve deficit  She exhibits normal muscle tone  Coordination normal    Skin: Skin is warm and dry  Psychiatric: She has a normal mood and affect  Nursing note and vitals reviewed  ED Medications  Medications   sodium chloride 0 9 % infusion (125 mL/hr Intravenous New Bag 5/16/18 1908)   ketorolac (TORADOL) injection 15 mg (15 mg Intravenous Given 5/16/18 1903)   aspirin chewable tablet 324 mg (324 mg Oral Given 5/16/18 1903)       Diagnostic Studies  Results Reviewed     Procedure Component Value Units Date/Time    Troponin I [[id number]]  (Normal) Collected:  05/16/18 1844    Lab Status:  Final result Specimen:  Blood from Arm, Left Updated:  05/16/18 1908     Troponin I <0 02 ng/mL     Narrative:         [employer] Chemistry analyzer 99% cutoff is > 0 04 ng/mL in network labs    o cTnI 99% cutoff is useful only when applied to patients in the clinical setting of myocardial ischemia  o cTnI 99% cutoff should be interpreted in the context of clinical history, ECG findings and possibly cardiac imaging to establish correct diagnosis    o cTnI 99% cutoff may be suggestive but clearly not indicative of a coronary event without the clinical setting of myocardial ischemia  Basic metabolic panel [[id number]]  (Abnormal) Collected:  05/16/18 1844    Lab Status:  Final result Specimen:  Blood from Arm, Left Updated:  05/16/18 1905     Sodium 140 mmol/L      Potassium 3 4 (L) mmol/L      Chloride 101 mmol/L      CO2 31 mmol/L      Anion Gap 8 mmol/L      BUN 19 mg/dL      Creatinine 0 83 mg/dL      Glucose 98 mg/dL      Calcium 9 6 mg/dL      eGFR 66 ml/min/1 73sq m     Narrative:         National Kidney Disease Education Program recommendations are as follows:  GFR calculation is accurate only with a steady state creatinine  Chronic Kidney disease less than 60 ml/min/1 73 sq  meters  Kidney failure less than 15 ml/min/1 73 sq  meters      Hepatic function panel [[id number]]  (Normal) Collected:  05/16/18 1844    Lab Status:  Final result Specimen:  Blood from Arm, Left Updated:  05/16/18 1905     Total Bilirubin 0 60 mg/dL      Bilirubin, Direct 0 12 mg/dL      Alkaline Phosphatase 81 U/L      AST 18 U/L      ALT 17 U/L      Total Protein 7 9 g/dL      Albumin 3 6 g/dL     Protime-INR [[id number]]  (Abnormal) Collected:  05/16/18 1844    Lab Status:  Final result Specimen:  Blood from Arm, Left Updated:  05/16/18 1902     Protime 14 6 (H) seconds      INR 1 11    APTT [[id number]]  (Normal) Collected:  05/16/18 1844    Lab Status:  Final result Specimen:  Blood from Arm, Left Updated:  05/16/18 1902     PTT 29 seconds     CBC and differential [[id number]]  (Abnormal) Collected:  05/16/18 1844    Lab Status:  Final result Specimen:  Blood from Arm, Left Updated:  05/16/18 1849     WBC 11 69 (H) Thousand/uL      RBC 4 16 Million/uL      Hemoglobin 13 2 g/dL      Hematocrit 40 3 %      MCV 97 fL      MCH 31 7 pg      MCHC 32 8 g/dL      RDW 13 0 %      MPV 10 0 fL      Platelets 344 Thousands/uL      nRBC 0 /100 WBCs      Neutrophils Relative 69 %      Immat GRANS % 1 %      Lymphocytes Relative 22 %      Monocytes Relative 8 %      Eosinophils Relative 0 %      Basophils Relative 0 %      Neutrophils Absolute 7 99 (H) Thousands/µL      Immature Grans Absolute 0 06 Thousand/uL      Lymphocytes Absolute 2 61 Thousands/µL      Monocytes Absolute 0 94 Thousand/µL      Eosinophils Absolute 0 04 Thousand/µL      Basophils Absolute 0 05 Thousands/µL                  XR shoulder 2+ views RIGHT   ED Interpretation by Sharla Argueta DO (05/16 2016)      No acute osseous abnormality  Degenerative changes as described  Workstation performed: [id number]         Final Result by Jeffry Adler MD (05/16 1941)      No acute osseous abnormality  Degenerative changes as described  Workstation performed: [id number]         XR chest 2 views   ED Interpretation by Sharla Argueta DO (05/16 1901)   NAD      Final Result by Jeffry Adler MD (05/16 1940)      No acute cardiopulmonary disease  Workstation performed: [id number]                    Procedures  ECG 12 Lead Documentation  Date/Time: 5/16/2018 6:41 PM  Performed by: Fausto Mak by: Ian Waters     ECG reviewed by me, the ED Provider: yes    Patient location:  ED  Interpretation:     Interpretation: normal    Rate:     ECG rate assessment: bradycardic    Rhythm:     Rhythm: sinus rhythm    Ectopy:     Ectopy: none    ST segments:     ST segments:  Normal  T waves:     T waves: normal             Phone Contacts  ED Phone Contact    ED Course          patient states she was pain free with the IV Toradol  Patient had 2-troponins  I explained to patient that she should follow up with Orthopedics but I also told her to follow up with a cardiologist and gave her prescription for stress test since shoulder blade pain can be a vague symptom of cardiac disease  I placed patient on some Naprosyn and gave her follow-up  MDM  CritCare Time     Differential diagnosis includes:  1  Right shoulder pain  2  Right shoulder blade pain  3  Rule out cardiac disease  4  Musculoskeletal pain  5  Referred pain    Disposition  Final diagnoses:   Right shoulder pain     Time reflects when diagnosis was documented in both MDM as applicable and the Disposition within this note     Time User Action Codes Description Comment    5/16/2018  8:33 PM Mireya, [address] Right shoulder pain       ED Disposition     ED Disposition Condition Comment    Discharge  [address] discharge to home/self care  Condition at discharge: Good        Follow-up Information     Follow up With Specialties Details Why Zach Saravia MD  In 2 days  [facility]      Brayan Pichardo MD Cardiology, Radiology In 1 week  [facility] [address] [place] [address]      Bel Santana MD Orthopedic Surgery In 1 week  [address] [place] [address]          Patient's Medications   Discharge Prescriptions    NAPROXEN (NAPROSYN) 500 MG TABLET    Take 1 tablet (500 mg total) by mouth 2 (two) times a day with meals for 10 days       Start Date: 5/16/2018 End Date: 5/26/2018       Order Dose: 500 mg       Quantity: 20 tablet    Refills: 0       Outpatient Discharge Orders  NM myocardial perfusion spect (rx stress and/or rest)   Standing Status: Future  Standing Exp   Date: 05/16/22         ED Provider  Electronically Signed by           Linnea Bejarano DO  05/16/18 1503

## 2018-05-17 PROCEDURE — 99284 EMERGENCY DEPT VISIT MOD MDM: CPT

## 2018-05-17 NOTE — ED NOTES
Called [facility] for patient ride  [facility] unable to pick patient up  Will call when available        Samuel Ferrer RN  05/16/18 7518

## 2018-05-17 NOTE — DISCHARGE INSTRUCTIONS
Shoulder Pain, Ambulatory Care   GENERAL INFORMATION:   Shoulder pain  is a common problem and can affect your daily activities  Pain can be caused by a problem within your shoulder  Shoulder pain may also be caused by pain that spreads to your shoulder from another part of your body  Seek immediate care for the following symptoms:   · Severe pain    · Inability to move your arm or shoulder    · Numbness or tingling in your shoulder or arm  Treatment for shoulder pain  may include any of the following:  · Acetaminophen  decreases pain and fever  It is available without a doctor's order  Ask how much to take and how often to take it  Follow directions  Acetaminophen can cause liver damage if not taken correctly  · NSAIDs  help decrease swelling and pain or fever  This medicine is available with or without a doctor's order  NSAIDs can cause stomach bleeding or kidney problems in certain people  If you take blood thinner medicine, always ask your healthcare provider if NSAIDs are safe for you  Always read the medicine label and follow directions  · A steroid injection  may help decrease pain and swelling  · Surgery  may be needed for long-term pain and loss of function  Manage your symptoms:   · Apply ice  on your shoulder for 20 to 30 minutes every 2 hours or as directed  Use an ice pack, or put crushed ice in a plastic bag  Cover it with a towel  Ice helps prevent tissue damage and decreases swelling and pain  · Apply heat if ice does not help your symptoms  Apply heat on your shoulder for 20 to 30 minutes every 2 hours for as many days as directed  Heat helps decrease pain and muscle spasms  · Go to physical or occupational therapy as directed  A physical therapist teaches you exercises to help improve movement and strength, and to decrease pain  An occupational therapist teaches you skills to help with your daily activities  Prevent shoulder pain:   · Stretch and strengthen your shoulder  Use proper technique during exercises and sports  · Limit activities as directed  Try to avoid repeated overhead movements  Follow up with your healthcare provider or orthopedist as directed:  Write down your questions so you remember to ask them during your visits  CARE AGREEMENT:   You have the right to help plan your care  Learn about your health condition and how it may be treated  Discuss treatment options with your caregivers to decide what care you want to receive  You always have the right to refuse treatment  The above information is an  only  It is not intended as medical advice for individual conditions or treatments  Talk to your doctor, nurse or pharmacist before following any medical regimen to see if it is safe and effective for you  © 2014 [address] is for End User's use only and may not be sold, redistributed or otherwise used for commercial purposes  All illustrations and images included in CareNotes® are the copyrighted property of A D [employer] M [employer]  or Jose Correa  Coronary Artery Disease   AMBULATORY CARE:   Coronary artery disease (CAD)  is narrowing of the arteries to your heart caused by a buildup of plaque  Plaque is made up of cholesterol and other substances  The narrowing in your arteries decreases the amount of blood that can flow to your heart  This causes your heart to get less oxygen  You may not have any symptoms of CAD  It is important for you to manage CAD even if you feel well  CAD can lead to a heart attack if it is not managed         Common symptoms include the following:   · Chest pain (angina), causing burning, squeezing, or crushing tightness in your chest    · Pain that spreads to your neck, jaw, or shoulder blade    · Nausea, vomiting, sweating, fainting, and hands and feet that are cold to the touch  Call 911 for any of the following:   · You have any of the following signs of a heart attack: ¨ Squeezing, pressure, or pain in your chest that lasts longer than 5 minutes or returns    ¨ Discomfort or pain in your back, neck, jaw, stomach, or arm     ¨ Trouble breathing    ¨ Nausea or vomiting    ¨ Lightheadedness or a sudden cold sweat, especially with chest pain or trouble breathing    Contact your healthcare provider if:   · You have chest pain that is more frequent, or you have chest pain at rest     · You have questions or concerns about your condition or care  Medicines used to treat CAD:   · Blood pressure medicines  are given to lower your blood pressure  ACE inhibitors help keep your blood vessels relaxed and open to help keep blood flowing into your heart  Beta-blockers keep your heart pumping strongly and regularly so it does not work too hard to get oxygen  · Cholesterol medicines  help lower blood cholesterol levels  · Nitrates , such as nitroglycerin, relax the arteries of your heart so it gets more oxygen  They help to relieve your chest pain  · Antiplatelets , such as aspirin, help prevent blood clots  Take your antiplatelet medicine exactly as directed  These medicines make it more likely for you to bleed or bruise  If you are told to take aspirin, do not take acetaminophen or ibuprofen instead  · Blood thinners  keep clots from forming in your blood  Clots may cause heart attacks, strokes, or death  This medicine makes it more likely for you to bleed or bruise  · Do not take certain medicines without asking your healthcare provider first   These include NSAIDs, herbal or vitamin supplements, or hormones (estrogen or progestin)  Procedures used to treat CAD:   · Angioplasty  may be done to open an artery blocked by plaque  A tube with a balloon on the end is threaded into the blocked artery  Once the tube is in the artery, the balloon is inflated  As the balloon inflates, it presses the plaque against the artery wall to open the artery   A stent may be placed in your artery to keep it open  · Coronary artery bypass graft surgery (CABG)  is open heart surgery  Healthcare providers take arteries or veins from other areas in your body and use them to bypass or go around the blocked arteries of your heart  Cardiac rehabilitation:  Your healthcare provider may recommend that you attend cardiac rehabilitation (rehab)  This is a program run by specialists who will help you safely strengthen your heart and reduce the risk for more heart disease  The plan includes exercise, relaxation, stress management, and heart-healthy nutrition  Healthcare providers will also check to make sure any medicines you are taking are working  Manage CAD to prevent a heart attack:   · Do not smoke  Nicotine and other chemicals in cigarettes and cigars can cause heart and lung damage  Ask your healthcare provider for information if you currently smoke and need help to quit  E-cigarettes or smokeless tobacco still contain nicotine  Talk to your healthcare provider before you use these products  · Exercise regularly  Exercise at least 30 minutes each day, on most days of the week  Exercise helps to lower high cholesterol and high blood pressure  It can also help you maintain a healthy weight  Ask your healthcare provider about the kind of exercise you should do and how to get started  · Maintain a healthy weight  If you are overweight, talk to your healthcare provider about how to lose weight  A weight loss of 10% can improve your heart health  · Eat heart-healthy foods  Include fresh fruits and vegetables in your meal plan  Choose low-fat foods, such as skim or 1% fat milk, low-fat cheese and yogurt, fish, chicken (without skin), and lean meats  Eat two 4-ounce servings of fish high in omega-3 fats each week, such as salmon, fresh tuna, and herring  Do not eat foods that are high in sodium, such as canned foods, potato chips, salty snacks, and cold cuts  Put less table salt on your food  · Limit or do not drink alcohol  A drink of alcohol is 12 ounces of beer, 5 ounces of wine, or 1½ ounces of liquor  · Manage other health conditions  Follow your healthcare provider's advice on how to manage other conditions that can affect your heart health  These include diabetes, high blood pressure, and high cholesterol  You may need to take medicines for these conditions and make other lifestyle changes  · Ask if you should have a flu vaccine  The flu can be dangerous for a person who has CAD  The flu vaccine is available every year in the fall  Follow up with your healthcare provider as directed: You may need to return for other tests  You may also be referred to a cardiac surgeon  Write down your questions so you remember to ask them during your visits  © 2017 [address] Information is for End User's use only and may not be sold, redistributed or otherwise used for commercial purposes  All illustrations and images included in CareNotes® are the copyrighted property of A D A M , Inc  or Jose Correa  The above information is an  only  It is not intended as medical advice for individual conditions or treatments  Talk to your doctor, nurse or pharmacist before following any medical regimen to see if it is safe and effective for you

## 2018-05-18 LAB
ATRIAL RATE: 74 BPM
P AXIS: 15 DEGREES
PR INTERVAL: 126 MS
QRS AXIS: 35 DEGREES
QRSD INTERVAL: 76 MS
QT INTERVAL: 358 MS
QTC INTERVAL: 397 MS
T WAVE AXIS: 56 DEGREES
VENTRICULAR RATE: 74 BPM

## 2018-05-18 PROCEDURE — 93010 ELECTROCARDIOGRAM REPORT: CPT | Performed by: INTERNAL MEDICINE

## 2018-08-21 ENCOUNTER — APPOINTMENT (EMERGENCY)
Dept: RADIOLOGY | Facility: HOSPITAL | Age: 83
End: 2018-08-21
Payer: COMMERCIAL

## 2018-08-21 ENCOUNTER — HOSPITAL ENCOUNTER (EMERGENCY)
Facility: HOSPITAL | Age: 83
Discharge: HOME | End: 2018-08-21
Attending: EMERGENCY MEDICINE | Admitting: EMERGENCY MEDICINE
Payer: COMMERCIAL

## 2018-08-21 VITALS
SYSTOLIC BLOOD PRESSURE: 161 MMHG | WEIGHT: 162.48 LBS | BODY MASS INDEX: 29.9 KG/M2 | HEART RATE: 66 BPM | HEIGHT: 62 IN | RESPIRATION RATE: 18 BRPM | DIASTOLIC BLOOD PRESSURE: 69 MMHG | TEMPERATURE: 97.6 F | OXYGEN SATURATION: 99 %

## 2018-08-21 DIAGNOSIS — M25.569 KNEE PAIN: Primary | ICD-10-CM

## 2018-08-21 PROCEDURE — 73564 X-RAY EXAM KNEE 4 OR MORE: CPT

## 2018-08-21 PROCEDURE — 99283 EMERGENCY DEPT VISIT LOW MDM: CPT

## 2018-08-21 NOTE — DISCHARGE INSTRUCTIONS
Arthralgia   WHAT YOU NEED TO KNOW:   Arthralgia is pain in one or more joints, with no inflammation  It may be short-term and get better within 6 to 8 weeks  Arthralgia can be an early sign of arthritis  Arthralgia may be caused by a medical condition, such as a hormone disorder or a tumor  It may also be caused by an infection or injury  DISCHARGE INSTRUCTIONS:   Medicines: The following medicines may  be ordered for you:  · Acetaminophen  decreases pain  Ask how much to take and how often to take it  Follow directions  Acetaminophen can cause liver damage if not taken correctly  · NSAIDs  decrease pain and prevent swelling  Ask your healthcare provider which medicine is right for you  Ask how much to take and when to take it  Take as directed  NSAIDs can cause stomach bleeding and kidney problems if not taken correctly  · Pain relief cream  decreases pain  Use this cream as directed  · Take your medicine as directed  Contact your healthcare provider if you think your medicine is not helping or if you have side effects  Tell him of her if you are allergic to any medicine  Keep a list of the medicines, vitamins, and herbs you take  Include the amounts, and when and why you take them  Bring the list or the pill bottles to follow-up visits  Carry your medicine list with you in case of an emergency  Follow up with your healthcare provider or specialist as directed:  Write down your questions so you remember to ask them during your visits  Self-care:   · Apply heat  to help decrease pain  Use a heating pad or heat wrap  Apply heat for 20 to 30 minutes every 2 hours for as many days as directed  · Rest  as much as possible  Avoid activities that cause joint pain  · Apply ice  to help decrease swelling and pain  Ice may also help prevent tissue damage  Use an ice pack, or put crushed ice in a plastic bag   Cover it with a towel and place it on your painful joint for 15 to 20 minutes every hour or as directed  · Support  the joint with a brace or elastic wrap as directed  · Elevate  your joint above the level of your heart as often as you can to help decrease swelling and pain  Prop your painful joint on pillows or blankets to keep it elevated comfortably  · Lose weight  if you are overweight  Extra weight can put pressure on your joints and cause more pain  Ask your healthcare provider how much you should weigh  Ask him to help you create a weight loss plan  · Exercise  regularly to help improve joint movement and to decrease pain  Ask about the best exercise plan for you  Low-impact exercises can help take the pressure off your joints  Examples are walking, swimming, and water aerobics  Physical therapy:  A physical therapist teaches you exercises to help improve movement and strength, and to decrease pain  Ask your healthcare provider if physical therapy is right for you  Contact your healthcare provider or specialist if:   · You have a fever  · You continue to have joint pain that cannot be relieved with heat, ice, or medicine  · You have pain and inflammation around your joint  · You have questions or concerns about your condition or care  Return to the emergency department if:   · You have sudden, severe pain when you move your joint  · You have a fever and shaking chills  · You cannot move your joint  · You lose feeling on the side of your body where you have the painful joint  © 2017 [address]  Information is for End User's use only and may not be sold, redistributed or otherwise used for commercial purposes  All illustrations and images included in CareNotes® are the copyrighted property of A [employer] M , Inc  or Jose Correa  The above information is an  only  It is not intended as medical advice for individual conditions or treatments   Talk to your doctor, nurse or pharmacist before following any medical regimen to see if it is safe and effective for you

## 2018-08-21 NOTE — ED PROVIDER NOTES
History  Chief Complaint   Patient presents with    Knee Pain     Patient  states his wife had left knee replacement surgery 8 months ago and continues to have increasinging pain  Per patient  he was unable to sleep last night due to his wife screaming in pain from her knee  67-year-old female patient presents emergency department for evaluation of right knee pain  Patient is [place] speaking only, her  is providing interpretation for her  Patient had knee replacement done 10 months ago, she has had knee pain since that time  The patient has had no recent traumas  There is no overlying cellulitis or signs of infection  Patient have an x-ray done and then will likely require follow-up with her surgeon  The family was hoping that the patient could be brought here to have an MRI of the knee  History provided by:  Spouse and patient   used: Yes    Knee Pain   Location:  Knee  Injury: no    Knee location:  R knee  Pain details:     Quality:  Aching    Severity:  Mild    Onset quality:  Gradual    Timing:  Constant    Progression:  Worsening  Chronicity:  New  Dislocation: no    Foreign body present:  No foreign bodies  Relieved by:  Nothing  Worsened by:  Nothing  Ineffective treatments:  None tried  Associated symptoms: decreased ROM and swelling    Associated symptoms: no fatigue    Risk factors: no known bone disorder        Prior to Admission Medications   Prescriptions Last Dose Informant Patient Reported?  Taking?   atorvastatin (LIPITOR) 10 mg tablet   Yes No   Sig: Take 10 mg by mouth daily   clopidogrel (PLAVIX) 75 mg tablet   Yes No   Sig: Take 75 mg by mouth daily   lisinopril (ZESTRIL) 20 mg tablet   Yes No   Sig: Take 20 mg by mouth daily   naproxen (NAPROSYN) 500 mg tablet   No No   Sig: Take 1 tablet (500 mg total) by mouth 2 (two) times a day with meals for 10 days   omeprazole (PriLOSEC) 40 MG capsule   Yes No   Sig: Take 40 mg by mouth daily tolterodine (DETROL LA) 4 mg 24 hr capsule   Yes No   Sig: Take 4 mg by mouth daily   traMADol (ULTRAM) 50 mg tablet   Yes No   Sig: Take 50 mg by mouth every 6 (six) hours as needed for moderate pain      Facility-Administered Medications: None       Past Medical History:   Diagnosis Date    CHF (congestive heart failure) (HCC)     Hyperlipidemia     Hypertension     Knee pain     right       Past Surgical History:   Procedure Laterality Date    BACK SURGERY         History reviewed  No pertinent family history  I have reviewed and agree with the history as documented  Social History   Substance Use Topics    Smoking status: Never Smoker    Smokeless tobacco: Never Used    Alcohol use No        Review of Systems   Constitutional: Negative for fatigue  All other systems reviewed and are negative  Physical Exam  Physical Exam   Constitutional: She is oriented to person, place, and time  She appears well-developed and well-nourished  HENT:   Head: Normocephalic and atraumatic  Right Ear: External ear normal    Left Ear: External ear normal    Eyes: Conjunctivae and EOM are normal    Neck: No JVD present  No tracheal deviation present  No thyromegaly present  Cardiovascular: Normal rate  Pulmonary/Chest: Effort normal and breath sounds normal  No stridor  Abdominal: Soft  She exhibits no distension and no mass  There is no tenderness  There is no guarding  No hernia  Musculoskeletal: Normal range of motion  She exhibits no edema, tenderness or deformity  Lymphadenopathy:     She has no cervical adenopathy  Neurological: She is alert and oriented to person, place, and time  Skin: Skin is warm  No rash noted  No erythema  No pallor  Psychiatric: She has a normal mood and affect  Her behavior is normal    Nursing note and vitals reviewed        Vital Signs  ED Triage Vitals   Temperature Pulse Respirations Blood Pressure SpO2   08/21/18 1153 08/21/18 1153 08/21/18 1153 08/21/18 1154 08/21/18 1153   97 6 °F (36 4 °C) 66 18 161/69 99 %      Temp Source Heart Rate Source Patient Position - Orthostatic VS BP Location FiO2 (%)   08/21/18 1153 08/21/18 1153 08/21/18 1154 08/21/18 1154 --   Oral Monitor Sitting Right arm       Pain Score       --                  Vitals:    08/21/18 1153 08/21/18 1154   BP:  161/69   Pulse: 66    Patient Position - Orthostatic VS:  Sitting       Visual Acuity      ED Medications  Medications - No data to display    Diagnostic Studies  Results Reviewed     None                 XR knee 4+ vw right injury   Final Result by Marcella Gonzalez MD (08/21 1310)      No acute osseous abnormality  Workstation performed: [id number]                    Procedures  Procedures       Phone Contacts  ED Phone Contact    ED Course           Identification of Seniors at Risk      Most Recent Value   (ISAR) Identification of Seniors at Risk   Before the illness or injury that brought you to the Emergency, did you need someone to help you on a regular basis? 0 Filed at: 08/21/2018 1155   In the last 24 hours, have you needed more help than usual?  0 Filed at: 08/21/2018 1155   Have you been hospitalized for one or more nights during the past 6 months? 0 Filed at: 08/21/2018 1155   In general, do you see well?  0 Filed at: 08/21/2018 1155   In general, do you have serious problems with your memory? 0 Filed at: 08/21/2018 1155   Do you take more than three different medications every day?   1 Filed at: 08/21/2018 1155   ISAR Score  1 Filed at: 08/21/2018 1155                          MDM  Number of Diagnoses or Management Options  Knee pain: new and requires workup     Amount and/or Complexity of Data Reviewed  Tests in the radiology section of CPT®: ordered and reviewed  Decide to obtain previous medical records or to obtain history from someone other than the patient: yes  Review and summarize past medical records: yes    Patient Progress  Patient progress: stable    CritCare Time    Disposition  Final diagnoses:   Knee pain     Time reflects when diagnosis was documented in both MDM as applicable and the Disposition within this note     Time User Action Codes Description Comment    8/21/2018  1:25 PM Natalee Riddhi Overton [[id number]] Knee pain       ED Disposition     ED Disposition Condition Comment    Discharge  [place] discharge to home/self care  Condition at discharge: Stable        Follow-up Information     Follow up With Specialties Details Why Batsheva Funez MD Family Medicine   [address]  [address]  [facility] [address]  [phone]            Discharge Medication List as of 8/21/2018  1:26 PM      CONTINUE these medications which have NOT CHANGED    Details   atorvastatin (LIPITOR) 10 mg tablet Take 10 mg by mouth daily, Until Discontinued, Historical Med      clopidogrel (PLAVIX) 75 mg tablet Take 75 mg by mouth daily, Until Discontinued, Historical Med      lisinopril (ZESTRIL) 20 mg tablet Take 20 mg by mouth daily, Until Discontinued, Historical Med      naproxen (NAPROSYN) 500 mg tablet Take 1 tablet (500 mg total) by mouth 2 (two) times a day with meals for 10 days, Starting Wed 5/16/2018, Until Sat 5/26/2018, Print      omeprazole (PriLOSEC) 40 MG capsule Take 40 mg by mouth daily, Until Discontinued, Historical Med      tolterodine (DETROL LA) 4 mg 24 hr capsule Take 4 mg by mouth daily, Until Discontinued, Historical Med      traMADol (ULTRAM) 50 mg tablet Take 50 mg by mouth every 6 (six) hours as needed for moderate pain, Until Discontinued, Historical Med           No discharge procedures on file      ED Provider  Electronically Signed by           Atiya Mon DO  08/22/18 0745

## 2019-03-18 ENCOUNTER — HOSPITAL ENCOUNTER (EMERGENCY)
Facility: HOSPITAL | Age: 84
Discharge: HOME | End: 2019-03-18
Attending: EMERGENCY MEDICINE | Admitting: EMERGENCY MEDICINE
Payer: COMMERCIAL

## 2019-03-18 ENCOUNTER — APPOINTMENT (EMERGENCY)
Dept: CT IMAGING | Facility: HOSPITAL | Age: 84
End: 2019-03-18
Payer: COMMERCIAL

## 2019-03-18 ENCOUNTER — APPOINTMENT (EMERGENCY)
Dept: RADIOLOGY | Facility: HOSPITAL | Age: 84
End: 2019-03-18
Payer: COMMERCIAL

## 2019-03-18 VITALS
DIASTOLIC BLOOD PRESSURE: 66 MMHG | SYSTOLIC BLOOD PRESSURE: 152 MMHG | HEART RATE: 58 BPM | WEIGHT: 170.19 LBS | RESPIRATION RATE: 16 BRPM | OXYGEN SATURATION: 99 % | BODY MASS INDEX: 29.06 KG/M2 | HEIGHT: 64 IN | TEMPERATURE: 97.9 F

## 2019-03-18 DIAGNOSIS — R00.2 PALPITATIONS: Primary | ICD-10-CM

## 2019-03-18 LAB
ALBUMIN SERPL BCP-MCNC: 3.4 G/DL (ref 3.5–5)
ALP SERPL-CCNC: 82 U/L (ref 46–116)
ALT SERPL W P-5'-P-CCNC: 24 U/L (ref 12–78)
ANION GAP SERPL CALCULATED.3IONS-SCNC: 9 MMOL/L (ref 4–13)
AST SERPL W P-5'-P-CCNC: 38 U/L (ref 5–45)
ATRIAL RATE: 64 BPM
BASOPHILS # BLD AUTO: 0.04 THOUSANDS/ΜL (ref 0–0.1)
BASOPHILS NFR BLD AUTO: 1 % (ref 0–1)
BILIRUB SERPL-MCNC: 0.7 MG/DL (ref 0.2–1)
BUN SERPL-MCNC: 20 MG/DL (ref 5–25)
CALCIUM SERPL-MCNC: 9.4 MG/DL (ref 8.3–10.1)
CHLORIDE SERPL-SCNC: 106 MMOL/L (ref 100–108)
CO2 SERPL-SCNC: 27 MMOL/L (ref 21–32)
CREAT SERPL-MCNC: 0.89 MG/DL (ref 0.6–1.3)
EOSINOPHIL # BLD AUTO: 0.03 THOUSAND/ΜL (ref 0–0.61)
EOSINOPHIL NFR BLD AUTO: 0 % (ref 0–6)
ERYTHROCYTE [DISTWIDTH] IN BLOOD BY AUTOMATED COUNT: 12.8 % (ref 11.6–15.1)
GFR SERPL CREATININE-BSD FRML MDRD: 61 ML/MIN/1.73SQ M
GLUCOSE SERPL-MCNC: 93 MG/DL (ref 65–140)
HCT VFR BLD AUTO: 44.7 % (ref 34.8–46.1)
HGB BLD-MCNC: 14.6 G/DL (ref 11.5–15.4)
IMM GRANULOCYTES # BLD AUTO: 0.05 THOUSAND/UL (ref 0–0.2)
IMM GRANULOCYTES NFR BLD AUTO: 1 % (ref 0–2)
LYMPHOCYTES # BLD AUTO: 1.15 THOUSANDS/ΜL (ref 0.6–4.47)
LYMPHOCYTES NFR BLD AUTO: 15 % (ref 14–44)
MCH RBC QN AUTO: 32.3 PG (ref 26.8–34.3)
MCHC RBC AUTO-ENTMCNC: 32.7 G/DL (ref 31.4–37.4)
MCV RBC AUTO: 99 FL (ref 82–98)
MONOCYTES # BLD AUTO: 0.27 THOUSAND/ΜL (ref 0.17–1.22)
MONOCYTES NFR BLD AUTO: 4 % (ref 4–12)
NEUTROPHILS # BLD AUTO: 6.04 THOUSANDS/ΜL (ref 1.85–7.62)
NEUTS SEG NFR BLD AUTO: 79 % (ref 43–75)
NRBC BLD AUTO-RTO: 0 /100 WBCS
NT-PROBNP SERPL-MCNC: 1281 PG/ML
P AXIS: -1 DEGREES
PLATELET # BLD AUTO: 300 THOUSANDS/UL (ref 149–390)
PMV BLD AUTO: 10.3 FL (ref 8.9–12.7)
POTASSIUM SERPL-SCNC: 5.7 MMOL/L (ref 3.5–5.3)
PR INTERVAL: 126 MS
PROT SERPL-MCNC: 7.5 G/DL (ref 6.4–8.2)
QRS AXIS: 36 DEGREES
QRSD INTERVAL: 74 MS
QT INTERVAL: 386 MS
QTC INTERVAL: 398 MS
RBC # BLD AUTO: 4.52 MILLION/UL (ref 3.81–5.12)
SODIUM SERPL-SCNC: 142 MMOL/L (ref 136–145)
T WAVE AXIS: 58 DEGREES
TROPONIN I SERPL-MCNC: <0.02 NG/ML
VENTRICULAR RATE: 64 BPM
WBC # BLD AUTO: 7.58 THOUSAND/UL (ref 4.31–10.16)

## 2019-03-18 PROCEDURE — 70450 CT HEAD/BRAIN W/O DYE: CPT

## 2019-03-18 PROCEDURE — 80053 COMPREHEN METABOLIC PANEL: CPT | Performed by: EMERGENCY MEDICINE

## 2019-03-18 PROCEDURE — 93010 ELECTROCARDIOGRAM REPORT: CPT | Performed by: INTERNAL MEDICINE

## 2019-03-18 PROCEDURE — 36415 COLL VENOUS BLD VENIPUNCTURE: CPT | Performed by: EMERGENCY MEDICINE

## 2019-03-18 PROCEDURE — 85025 COMPLETE CBC W/AUTO DIFF WBC: CPT | Performed by: EMERGENCY MEDICINE

## 2019-03-18 PROCEDURE — 93005 ELECTROCARDIOGRAM TRACING: CPT

## 2019-03-18 PROCEDURE — 96360 HYDRATION IV INFUSION INIT: CPT

## 2019-03-18 PROCEDURE — 84484 ASSAY OF TROPONIN QUANT: CPT | Performed by: EMERGENCY MEDICINE

## 2019-03-18 PROCEDURE — 99285 EMERGENCY DEPT VISIT HI MDM: CPT

## 2019-03-18 PROCEDURE — 83880 ASSAY OF NATRIURETIC PEPTIDE: CPT | Performed by: EMERGENCY MEDICINE

## 2019-03-18 PROCEDURE — 71046 X-RAY EXAM CHEST 2 VIEWS: CPT

## 2019-03-18 PROCEDURE — 96361 HYDRATE IV INFUSION ADD-ON: CPT

## 2019-03-18 RX ADMIN — SODIUM CHLORIDE 500 ML: 0.9 INJECTION, SOLUTION INTRAVENOUS at 14:17

## 2019-03-19 NOTE — ED PROVIDER NOTES
History  Chief Complaint   Patient presents with    Palpitations     pt reports new onset of dizziness starting two days ago along with generalized weakness     81 yr old female patient presents emergency department for evaluation of dizziness  The patient describes dizziness as vertiginous in its nature and something that she has had previously  She states it has been getting worse, however, and she came in for evaluation today  EKG was done at 1232 hours, shows a ventricular rate of 64, with premature atrial contractions, and no other obvious abnormalities  Patient be evaluated with a differential diagnosis to include but not be limited to urinary tract infection, pneumonia, viral illness  History provided by:  Patient   used: No    Palpitations   Palpitations quality:  Regular  Onset quality:  Gradual  Timing:  Intermittent  Chronicity:  New  Context: not anxiety, not blood loss, not bronchodilators, not dehydration, not exercise and not nicotine    Relieved by:  Nothing  Worsened by:  Nothing  Ineffective treatments:  None tried  Associated symptoms: no diaphoresis, no dizziness, no lower extremity edema, no malaise/fatigue, no near-syncope and no PND        None       Past Medical History:   Diagnosis Date    CHF [facility]congestive heart failure) ([facility] [address] )     Hyperlipidemia     Hypertension     Knee pain     right       Past Surgical History:   Procedure Laterality Date    BACK SURGERY      KNEE SURGERY         History reviewed  No pertinent family history  I have reviewed and agree with the history as documented  Social History     Tobacco Use    Smoking status: Never Smoker    Smokeless tobacco: Never Used   Substance Use Topics    Alcohol use: Never     Frequency: Never    Drug use: Never        Review of Systems   Constitutional: Negative for diaphoresis and malaise/fatigue  Cardiovascular: Negative for PND and near-syncope  Neurological: Negative for dizziness  All other systems reviewed and are negative  Physical Exam  Physical Exam   Constitutional: She is oriented to person, place, and time  She appears well-developed and well-nourished  HENT:   Head: Normocephalic and atraumatic  Right Ear: External ear normal    Left Ear: External ear normal    Eyes: Conjunctivae and EOM are normal    Neck: No JVD present  No tracheal deviation present  No thyromegaly present  Cardiovascular: Normal rate  Pulmonary/Chest: Effort normal and breath sounds normal  No stridor  Abdominal: Soft  She exhibits no distension and no mass  There is no tenderness  There is no guarding  No hernia  Musculoskeletal: Normal range of motion  She exhibits no edema, tenderness or deformity  Lymphadenopathy:     She has no cervical adenopathy  Neurological: She is alert and oriented to person, place, and time  Skin: Skin is warm  No rash noted  No erythema  No pallor  Psychiatric: She has a normal mood and affect  Her behavior is normal    Nursing note and vitals reviewed        Vital Signs  ED Triage Vitals [03/18/19 1230]   Temperature Pulse Respirations Blood Pressure SpO2   97 9 °F (36 6 °C) 57 16 137/65 100 %      Temp Source Heart Rate Source Patient Position - Orthostatic VS BP Location FiO2 (%)   Oral Monitor Lying Right arm --      Pain Score       No Pain           Vitals:    03/18/19 1230 03/18/19 1407 03/18/19 1534   BP: 137/65 128/60 152/66   Pulse: 57 56 58   Patient Position - Orthostatic VS: Lying Lying Lying         Visual Acuity      ED Medications  Medications   sodium chloride 0 9 % bolus 500 mL (0 mL Intravenous Stopped 3/18/19 1652)       Diagnostic Studies  Results Reviewed     Procedure Component Value Units Date/Time    NT-BNP PRO (BNP for [facility], [facility], [place], [place], [place], [facility], [facility], [place] campuses) [[id number]]  (Abnormal) Collected:  03/18/19 1434    Lab Status:  Final result Specimen:  Blood from Arm, Left Updated:  03/18/19 1507     NT-proBNP 1,281 pg/mL Comprehensive metabolic panel [[id number]]  (Abnormal) Collected:  03/18/19 1434    Lab Status:  Final result Specimen:  Blood from Arm, Left Updated:  03/18/19 1502     Sodium 142 mmol/L      Potassium 5 7 mmol/L      Chloride 106 mmol/L      CO2 27 mmol/L      ANION GAP 9 mmol/L      BUN 20 mg/dL      Creatinine 0 89 mg/dL      Glucose 93 mg/dL      Calcium 9 4 mg/dL      AST 38 U/L      ALT 24 U/L      Alkaline Phosphatase 82 U/L      Total Protein 7 5 g/dL      Albumin 3 4 g/dL      Total Bilirubin 0 70 mg/dL      eGFR 61 ml/min/1 73sq m     Narrative:       National Kidney Disease Education Program recommendations are as follows:  GFR calculation is accurate only with a steady state creatinine  Chronic Kidney disease less than 60 ml/min/1 73 sq  meters  Kidney failure less than 15 ml/min/1 73 sq  meters  Troponin I [[id number]]  (Normal) Collected:  03/18/19 1258    Lab Status:  Final result Specimen:  Blood from Arm, Right Updated:  03/18/19 1331     Troponin I <0 02 ng/mL     CBC and differential [[id number]]  (Abnormal) Collected:  03/18/19 1258    Lab Status:  Final result Specimen:  Blood from Arm, Right Updated:  03/18/19 1308     WBC 7 58 Thousand/uL      RBC 4 52 Million/uL      Hemoglobin 14 6 g/dL      Hematocrit 44 7 %      MCV 99 fL      MCH 32 3 pg      MCHC 32 7 g/dL      RDW 12 8 %      MPV 10 3 fL      Platelets 389 Thousands/uL      nRBC 0 /100 WBCs      Neutrophils Relative 79 %      Immat GRANS % 1 %      Lymphocytes Relative 15 %      Monocytes Relative 4 %      Eosinophils Relative 0 %      Basophils Relative 1 %      Neutrophils Absolute 6 04 Thousands/µL      Immature Grans Absolute 0 05 Thousand/uL      Lymphocytes Absolute 1 15 Thousands/µL      Monocytes Absolute 0 27 Thousand/µL      Eosinophils Absolute 0 03 Thousand/µL      Basophils Absolute 0 04 Thousands/µL                  CT head without contrast   Final Result by Wood Pineda MD (03/18 1552)      1    No acute intracranial abnormality  Microangiopathic changes  2   Diffuse small parenchymal calcification, 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 0 Filed at: 03/18/2019 1238   In the last 24 hours, have you needed more help than usual?  0 Filed at: 03/18/2019 1238   Have you been hospitalized for one or more nights during the past 6 months? 0 Filed at: 03/18/2019 1238   In general, do you see well?  0 Filed at: 03/18/2019 1238   In general, do you have serious problems with your memory? 0 Filed at: 03/18/2019 1238   Do you take more than three different medications every day?   1 Filed at: 03/18/2019 1238   ISAR Score  1 Filed at: 03/18/2019 1238                          MDM  Number of Diagnoses or Management Options  Palpitations: new and requires workup     Amount and/or Complexity of Data Reviewed  Clinical lab tests: ordered and reviewed  Tests in the radiology section of CPT®: ordered and reviewed  Decide to obtain previous medical records or to obtain history from someone other than the patient: yes  Review and summarize past medical records: yes    Patient Progress  Patient progress: stable      Disposition  Final diagnoses:   Palpitations     Time reflects when diagnosis was documented in both MDM as applicable and the Disposition within this note     Time User Action Codes Description Comment    3/18/2019  4:19 PM Wendy Husain Add [R00 2] Palpitations       ED Disposition     ED Disposition Condition Date/Time Comment    Discharge Stable Mon Mar 18, 2019  4:19 PM Bárbara Agudeloer discharge to home/self care  Follow-up Information     Follow up With Specialties Details Why Carin Vega MD Family Medicine   [address]  [address]  [facility] [address] [place] [address]  N  [phone]            There are no discharge medications for this patient  No discharge procedures on file      ED Provider  Electronically Signed by           Manoj Sheehan DO  03/19/19 0847

## 2019-04-22 ENCOUNTER — APPOINTMENT (EMERGENCY)
Dept: RADIOLOGY | Facility: HOSPITAL | Age: 84
End: 2019-04-22
Payer: COMMERCIAL

## 2019-04-22 ENCOUNTER — HOSPITAL ENCOUNTER (EMERGENCY)
Facility: HOSPITAL | Age: 84
Discharge: HOME | End: 2019-04-22
Attending: EMERGENCY MEDICINE | Admitting: EMERGENCY MEDICINE
Payer: COMMERCIAL

## 2019-04-22 VITALS
RESPIRATION RATE: 18 BRPM | OXYGEN SATURATION: 98 % | DIASTOLIC BLOOD PRESSURE: 65 MMHG | HEART RATE: 55 BPM | WEIGHT: 167.33 LBS | BODY MASS INDEX: 28.57 KG/M2 | SYSTOLIC BLOOD PRESSURE: 144 MMHG | TEMPERATURE: 97.7 F | HEIGHT: 64 IN

## 2019-04-22 DIAGNOSIS — R07.9 CHEST PAIN: ICD-10-CM

## 2019-04-22 DIAGNOSIS — R53.1 GENERALIZED WEAKNESS: ICD-10-CM

## 2019-04-22 DIAGNOSIS — R42 LIGHTHEADEDNESS: ICD-10-CM

## 2019-04-22 DIAGNOSIS — M25.561 RIGHT KNEE PAIN: Primary | ICD-10-CM

## 2019-04-22 LAB
ALBUMIN SERPL BCP-MCNC: 3.3 G/DL (ref 3.5–5)
ALP SERPL-CCNC: 76 U/L (ref 46–116)
ALT SERPL W P-5'-P-CCNC: 17 U/L (ref 12–78)
ANION GAP SERPL CALCULATED.3IONS-SCNC: 10 MMOL/L (ref 4–13)
AST SERPL W P-5'-P-CCNC: 14 U/L (ref 5–45)
ATRIAL RATE: 53 BPM
BACTERIA UR QL AUTO: ABNORMAL /HPF
BASOPHILS # BLD AUTO: 0.06 THOUSANDS/ΜL (ref 0–0.1)
BASOPHILS NFR BLD AUTO: 1 % (ref 0–1)
BILIRUB SERPL-MCNC: 0.3 MG/DL (ref 0.2–1)
BILIRUB UR QL STRIP: NEGATIVE
BUN SERPL-MCNC: 44 MG/DL (ref 5–25)
CALCIUM SERPL-MCNC: 8.5 MG/DL (ref 8.3–10.1)
CHLORIDE SERPL-SCNC: 109 MMOL/L (ref 100–108)
CLARITY UR: CLEAR
CO2 SERPL-SCNC: 26 MMOL/L (ref 21–32)
COLOR UR: YELLOW
CREAT SERPL-MCNC: 1.15 MG/DL (ref 0.6–1.3)
DEPRECATED D DIMER PPP: 530 NG/ML (FEU)
EOSINOPHIL # BLD AUTO: 0.12 THOUSAND/ΜL (ref 0–0.61)
EOSINOPHIL NFR BLD AUTO: 1 % (ref 0–6)
ERYTHROCYTE [DISTWIDTH] IN BLOOD BY AUTOMATED COUNT: 12.8 % (ref 11.6–15.1)
GFR SERPL CREATININE-BSD FRML MDRD: 44 ML/MIN/1.73SQ M
GLUCOSE SERPL-MCNC: 92 MG/DL (ref 65–140)
GLUCOSE UR STRIP-MCNC: NEGATIVE MG/DL
HCT VFR BLD AUTO: 38.4 % (ref 34.8–46.1)
HGB BLD-MCNC: 12.5 G/DL (ref 11.5–15.4)
HGB UR QL STRIP.AUTO: ABNORMAL
IMM GRANULOCYTES # BLD AUTO: 0.03 THOUSAND/UL (ref 0–0.2)
IMM GRANULOCYTES NFR BLD AUTO: 0 % (ref 0–2)
KETONES UR STRIP-MCNC: NEGATIVE MG/DL
LEUKOCYTE ESTERASE UR QL STRIP: NEGATIVE
LYMPHOCYTES # BLD AUTO: 3.21 THOUSANDS/ΜL (ref 0.6–4.47)
LYMPHOCYTES NFR BLD AUTO: 36 % (ref 14–44)
MCH RBC QN AUTO: 32.5 PG (ref 26.8–34.3)
MCHC RBC AUTO-ENTMCNC: 32.6 G/DL (ref 31.4–37.4)
MCV RBC AUTO: 100 FL (ref 82–98)
MONOCYTES # BLD AUTO: 0.73 THOUSAND/ΜL (ref 0.17–1.22)
MONOCYTES NFR BLD AUTO: 8 % (ref 4–12)
NEUTROPHILS # BLD AUTO: 4.68 THOUSANDS/ΜL (ref 1.85–7.62)
NEUTS SEG NFR BLD AUTO: 54 % (ref 43–75)
NITRITE UR QL STRIP: NEGATIVE
NON-SQ EPI CELLS URNS QL MICRO: ABNORMAL /HPF
NRBC BLD AUTO-RTO: 0 /100 WBCS
NT-PROBNP SERPL-MCNC: 199 PG/ML
P AXIS: 73 DEGREES
PH UR STRIP.AUTO: 6 [PH]
PLATELET # BLD AUTO: 230 THOUSANDS/UL (ref 149–390)
PMV BLD AUTO: 10.1 FL (ref 8.9–12.7)
POTASSIUM SERPL-SCNC: 3.5 MMOL/L (ref 3.5–5.3)
PR INTERVAL: 134 MS
PROT SERPL-MCNC: 6.6 G/DL (ref 6.4–8.2)
PROT UR STRIP-MCNC: NEGATIVE MG/DL
QRS AXIS: 22 DEGREES
QRSD INTERVAL: 76 MS
QT INTERVAL: 420 MS
QTC INTERVAL: 394 MS
RBC # BLD AUTO: 3.85 MILLION/UL (ref 3.81–5.12)
RBC #/AREA URNS AUTO: ABNORMAL /HPF
SODIUM SERPL-SCNC: 145 MMOL/L (ref 136–145)
SP GR UR STRIP.AUTO: 1.01 (ref 1–1.03)
T WAVE AXIS: 46 DEGREES
TROPONIN I SERPL-MCNC: <0.02 NG/ML
UROBILINOGEN UR QL STRIP.AUTO: 0.2 E.U./DL
VENTRICULAR RATE: 53 BPM
WBC # BLD AUTO: 8.83 THOUSAND/UL (ref 4.31–10.16)
WBC #/AREA URNS AUTO: ABNORMAL /HPF

## 2019-04-22 PROCEDURE — 85025 COMPLETE CBC W/AUTO DIFF WBC: CPT | Performed by: EMERGENCY MEDICINE

## 2019-04-22 PROCEDURE — 99283 EMERGENCY DEPT VISIT LOW MDM: CPT | Performed by: EMERGENCY MEDICINE

## 2019-04-22 PROCEDURE — 84484 ASSAY OF TROPONIN QUANT: CPT | Performed by: EMERGENCY MEDICINE

## 2019-04-22 PROCEDURE — 80053 COMPREHEN METABOLIC PANEL: CPT | Performed by: EMERGENCY MEDICINE

## 2019-04-22 PROCEDURE — 93010 ELECTROCARDIOGRAM REPORT: CPT | Performed by: INTERNAL MEDICINE

## 2019-04-22 PROCEDURE — 71046 X-RAY EXAM CHEST 2 VIEWS: CPT

## 2019-04-22 PROCEDURE — 36415 COLL VENOUS BLD VENIPUNCTURE: CPT | Performed by: EMERGENCY MEDICINE

## 2019-04-22 PROCEDURE — 93005 ELECTROCARDIOGRAM TRACING: CPT

## 2019-04-22 PROCEDURE — 83880 ASSAY OF NATRIURETIC PEPTIDE: CPT | Performed by: EMERGENCY MEDICINE

## 2019-04-22 PROCEDURE — 85379 FIBRIN DEGRADATION QUANT: CPT | Performed by: EMERGENCY MEDICINE

## 2019-04-22 PROCEDURE — 81001 URINALYSIS AUTO W/SCOPE: CPT | Performed by: EMERGENCY MEDICINE

## 2019-04-22 PROCEDURE — 99285 EMERGENCY DEPT VISIT HI MDM: CPT

## 2019-04-22 RX ORDER — ACETAMINOPHEN 325 MG/1
975 TABLET ORAL ONCE
Status: COMPLETED | OUTPATIENT
Start: 2019-04-22 | End: 2019-04-22

## 2019-04-22 RX ORDER — LIDOCAINE 50 MG/G
1 PATCH TOPICAL ONCE
Status: DISCONTINUED | OUTPATIENT
Start: 2019-04-22 | End: 2019-04-22 | Stop reason: HOSPADM

## 2019-04-22 RX ORDER — TRAMADOL HYDROCHLORIDE 50 MG/1
25 TABLET ORAL ONCE
Status: COMPLETED | OUTPATIENT
Start: 2019-04-22 | End: 2019-04-22

## 2019-04-22 RX ORDER — TRAMADOL HYDROCHLORIDE 50 MG/1
25 TABLET ORAL EVERY 6 HOURS PRN
Qty: 20 TABLET | Refills: 0 | Status: SHIPPED | OUTPATIENT
Start: 2019-04-22 | End: 2019-04-27

## 2019-04-22 RX ADMIN — TRAMADOL HYDROCHLORIDE 25 MG: 50 TABLET, COATED ORAL at 04:12

## 2019-04-22 RX ADMIN — ACETAMINOPHEN 975 MG: 325 TABLET, FILM COATED ORAL at 05:17

## 2019-04-22 RX ADMIN — LIDOCAINE 1 PATCH: 50 PATCH TOPICAL at 05:18

## 2019-05-01 ENCOUNTER — TELEPHONE (OUTPATIENT)
Dept: OBGYN CLINIC | Facility: HOSPITAL | Age: 84
End: 2019-05-01

## 2019-09-30 ENCOUNTER — APPOINTMENT (EMERGENCY)
Dept: RADIOLOGY | Facility: HOSPITAL | Age: 84
End: 2019-09-30
Payer: COMMERCIAL

## 2019-09-30 ENCOUNTER — HOSPITAL ENCOUNTER (EMERGENCY)
Facility: HOSPITAL | Age: 84
Discharge: HOME | End: 2019-09-30
Attending: EMERGENCY MEDICINE
Payer: COMMERCIAL

## 2019-09-30 VITALS
BODY MASS INDEX: 28.27 KG/M2 | OXYGEN SATURATION: 96 % | HEIGHT: 64 IN | SYSTOLIC BLOOD PRESSURE: 160 MMHG | TEMPERATURE: 98 F | HEART RATE: 60 BPM | WEIGHT: 165.57 LBS | RESPIRATION RATE: 18 BRPM | DIASTOLIC BLOOD PRESSURE: 74 MMHG

## 2019-09-30 DIAGNOSIS — M25.561 CHRONIC PAIN OF RIGHT KNEE: Primary | ICD-10-CM

## 2019-09-30 DIAGNOSIS — G89.29 CHRONIC PAIN OF RIGHT KNEE: Primary | ICD-10-CM

## 2019-09-30 PROCEDURE — 99284 EMERGENCY DEPT VISIT MOD MDM: CPT

## 2019-09-30 PROCEDURE — 73564 X-RAY EXAM KNEE 4 OR MORE: CPT

## 2019-09-30 PROCEDURE — 99284 EMERGENCY DEPT VISIT MOD MDM: CPT | Performed by: EMERGENCY MEDICINE

## 2019-09-30 RX ORDER — OXYCODONE HYDROCHLORIDE AND ACETAMINOPHEN 5; 325 MG/1; MG/1
1 TABLET ORAL EVERY 4 HOURS PRN
Qty: 20 TABLET | Refills: 0 | Status: SHIPPED | OUTPATIENT
Start: 2019-09-30 | End: 2020-01-26 | Stop reason: ALTCHOICE

## 2019-09-30 NOTE — ED PROVIDER NOTES
History  Chief Complaint   Patient presents with    Knee Pain     Patient brought in by EMS for chronic right knee pain; s/ right knee replacement 1year ago  Patient states "my only problem is my knee"  Denies chest pain, SOB, dizziness or palpitations  Denies any other problem  Pt comes to ED by ambulance for 1 5 years of constant aching moderate daily knee pain which is worse with ambulation and somewhat alleviated by rest  She denies fall or trauma  She denies fever  She denies weakness  She reports that she has had the pain since having her knee replaced and states that she takes tylenol without improvement in her sxs  She has not recently seen her orthopaedist for this pain  She denies hip pain and ankle/leg pain  She denies LLE pain  Prior to Admission Medications   Prescriptions Last Dose Informant Patient Reported? Taking?   atorvastatin (LIPITOR) 10 mg tablet   Yes No   Sig: Take 10 mg by mouth daily   clopidogrel (PLAVIX) 75 mg tablet   Yes No   Sig: Take 75 mg by mouth daily   lisinopril (ZESTRIL) 20 mg tablet   Yes No   Sig: Take 20 mg by mouth daily   naproxen (NAPROSYN) 500 mg tablet   No No   Sig: Take 1 tablet (500 mg total) by mouth 2 (two) times a day with meals for 10 days   omeprazole (PriLOSEC) 40 MG capsule   Yes No   Sig: Take 40 mg by mouth daily   tolterodine (DETROL LA) 4 mg 24 hr capsule   Yes No   Sig: Take 4 mg by mouth daily   traMADol (ULTRAM) 50 mg tablet   Yes No   Sig: Take 50 mg by mouth every 6 (six) hours as needed for moderate pain      Facility-Administered Medications: None       Past Medical History:   Diagnosis Date    CHF (congestive heart failure) (HCC)     Hyperlipidemia     Hypertension     Knee pain     right       Past Surgical History:   Procedure Laterality Date    BACK SURGERY      KNEE SURGERY         History reviewed  No pertinent family history  I have reviewed and agree with the history as documented      Social History     Tobacco Use    Smoking status: Never Smoker    Smokeless tobacco: Never Used   Substance Use Topics    Alcohol use: Never     Frequency: Never    Drug use: Never        Review of Systems   Musculoskeletal: Positive for arthralgias  All other systems reviewed and are negative  Physical Exam  Physical Exam   Constitutional: She is oriented to person, place, and time  She appears well-developed and well-nourished  No distress  HENT:   Head: Normocephalic and atraumatic  Eyes: Pupils are equal, round, and reactive to light  Conjunctivae and EOM are normal  Right eye exhibits no discharge  Left eye exhibits no discharge  Neck: Normal range of motion  Neck supple  No JVD present  Pulmonary/Chest: No stridor  Musculoskeletal: Normal range of motion  She exhibits no edema, tenderness or deformity  Normal gait  Painless active and passive ROM R hip and knee  Normal distal perfusion and sensation RLE  Neurological: She is alert and oriented to person, place, and time  No cranial nerve deficit or sensory deficit  She exhibits normal muscle tone  Coordination normal    Skin: Skin is warm and dry  Capillary refill takes less than 2 seconds  She is not diaphoretic  Nursing note and vitals reviewed  Vital Signs  ED Triage Vitals [09/30/19 0213]   Temperature Pulse Respirations Blood Pressure SpO2   98 1 °F (36 7 °C) (!) 54 18 168/74 96 %      Temp Source Heart Rate Source Patient Position - Orthostatic VS BP Location FiO2 (%)   Oral Monitor Lying Right arm --      Pain Score       9           Vitals:    09/30/19 0213 09/30/19 0215 09/30/19 0500   BP: 168/74 168/74 160/74   Pulse: (!) 54  60   Patient Position - Orthostatic VS: Lying           Visual Acuity      ED Medications  Medications - No data to display    Diagnostic Studies  Results Reviewed     None                 XR knee 4+ vw right injury   ED Interpretation by Renetta Christensen MD (09/30 0303)   No acute process                    Procedures  Procedures ED Course                               MDM  Number of Diagnoses or Management Options  Chronic pain of right knee:   Diagnosis management comments: Chronic R knee pain  Has not seen orthopaedist  Taking tylenol without relief  Plan - d/c home, percocet prn pain, f/u orthopaedics  Amount and/or Complexity of Data Reviewed  Tests in the radiology section of CPT®: reviewed and ordered        Disposition  Final diagnoses:   Chronic pain of right knee     Time reflects when diagnosis was documented in both MDM as applicable and the Disposition within this note     Time User Action Codes Description Comment    9/30/2019  3:04 AM Mumtazpriyank Maxwell Overton [M25 561,  G89 29] Chronic pain of right knee       ED Disposition     ED Disposition Condition Date/Time Comment    Discharge Stable Mon Sep 30, 2019  3:04 AM Alida Serna discharge to home/self care              Follow-up Information     Follow up With Specialties Details Why [address], MD Orthopedic Surgery In 1 week  [address]            Discharge Medication List as of 9/30/2019  3:04 AM      CONTINUE these medications which have NOT CHANGED    Details   atorvastatin (LIPITOR) 10 mg tablet Take 10 mg by mouth daily, Until Discontinued, Historical Med      clopidogrel (PLAVIX) 75 mg tablet Take 75 mg by mouth daily, Until Discontinued, Historical Med      lisinopril (ZESTRIL) 20 mg tablet Take 20 mg by mouth daily, Until Discontinued, Historical Med      naproxen (NAPROSYN) 500 mg tablet Take 1 tablet (500 mg total) by mouth 2 (two) times a day with meals for 10 days, Starting Wed 5/16/2018, Until Sat 5/26/2018, Print      omeprazole (PriLOSEC) 40 MG capsule Take 40 mg by mouth daily, Until Discontinued, Historical Med      tolterodine (DETROL LA) 4 mg 24 hr capsule Take 4 mg by mouth daily, Until Discontinued, Historical Med      traMADol (ULTRAM) 50 mg tablet Take 50 mg by mouth every 6 (six) hours as needed for moderate pain, Until Discontinued, Historical Med           No discharge procedures on file      ED Provider  Electronically Signed by           Elizabeth Duke MD  09/30/19 2472

## 2020-01-20 ENCOUNTER — APPOINTMENT (EMERGENCY)
Dept: RADIOLOGY | Facility: HOSPITAL | Age: 85
End: 2020-01-20
Payer: COMMERCIAL

## 2020-01-20 ENCOUNTER — HOSPITAL ENCOUNTER (EMERGENCY)
Facility: HOSPITAL | Age: 85
Discharge: HOME | End: 2020-01-20
Attending: EMERGENCY MEDICINE | Admitting: EMERGENCY MEDICINE
Payer: COMMERCIAL

## 2020-01-20 VITALS
SYSTOLIC BLOOD PRESSURE: 125 MMHG | DIASTOLIC BLOOD PRESSURE: 65 MMHG | HEIGHT: 66 IN | OXYGEN SATURATION: 97 % | WEIGHT: 159.83 LBS | HEART RATE: 75 BPM | RESPIRATION RATE: 19 BRPM | BODY MASS INDEX: 25.69 KG/M2

## 2020-01-20 DIAGNOSIS — M25.561 RIGHT KNEE PAIN: Primary | ICD-10-CM

## 2020-01-20 PROCEDURE — 96372 THER/PROPH/DIAG INJ SC/IM: CPT

## 2020-01-20 PROCEDURE — 99284 EMERGENCY DEPT VISIT MOD MDM: CPT | Performed by: EMERGENCY MEDICINE

## 2020-01-20 PROCEDURE — 73564 X-RAY EXAM KNEE 4 OR MORE: CPT

## 2020-01-20 PROCEDURE — 99284 EMERGENCY DEPT VISIT MOD MDM: CPT

## 2020-01-20 RX ORDER — IBUPROFEN 800 MG/1
800 TABLET ORAL 3 TIMES DAILY
Qty: 21 TABLET | Refills: 0 | Status: SHIPPED | OUTPATIENT
Start: 2020-01-20 | End: 2020-01-26 | Stop reason: ALTCHOICE

## 2020-01-20 RX ORDER — KETOROLAC TROMETHAMINE 30 MG/ML
15 INJECTION, SOLUTION INTRAMUSCULAR; INTRAVENOUS ONCE
Status: COMPLETED | OUTPATIENT
Start: 2020-01-20 | End: 2020-01-20

## 2020-01-20 RX ADMIN — KETOROLAC TROMETHAMINE 15 MG: 30 INJECTION, SOLUTION INTRAMUSCULAR at 15:12

## 2020-01-20 NOTE — ED NOTES
Pt still waiting for roxi wheeler, charge rn never received a ph call  slets called again  Registration number provided and registration made aware   [address] Florentino Boykin RN  01/20/20 1108 Aleksandr [facility] RN  01/20/20 4014

## 2020-01-20 NOTE — ED PROVIDER NOTES
History  Chief Complaint   Patient presents with    Knee Pain     pt presents to ed with right knee pain that started 2 days ago, pt had right knee replacement 1 5 years ago  no other complaints at this time     50-year-old female with acute on chronic right knee pain  Patient has history of right knee replacement, has some pain associated with this  Has had 2 days of worsening pain, aching pain in the right anterior knee, radiation down the shin  No numbness weakness or tingling  No calf pain  Still able to bear weight  Prior to Admission Medications   Prescriptions Last Dose Informant Patient Reported? Taking?   atorvastatin (LIPITOR) 10 mg tablet   Yes Yes   Sig: Take 10 mg by mouth daily   clopidogrel (PLAVIX) 75 mg tablet   Yes Yes   Sig: Take 75 mg by mouth daily   lisinopril (ZESTRIL) 20 mg tablet   Yes Yes   Sig: Take 20 mg by mouth daily   naproxen (NAPROSYN) 500 mg tablet   No No   Sig: Take 1 tablet (500 mg total) by mouth 2 (two) times a day with meals for 10 days   omeprazole (PriLOSEC) 40 MG capsule   Yes Yes   Sig: Take 40 mg by mouth daily   oxyCODONE-acetaminophen (PERCOCET) 5-325 mg per tablet   No Yes   Sig: Take 1 tablet by mouth every 4 (four) hours as needed for moderate pain for up to 20 dosesMax Daily Amount: 6 tablets   tolterodine (DETROL LA) 4 mg 24 hr capsule   Yes Yes   Sig: Take 4 mg by mouth daily   traMADol (ULTRAM) 50 mg tablet   Yes Yes   Sig: Take 50 mg by mouth every 6 (six) hours as needed for moderate pain      Facility-Administered Medications: None       Past Medical History:   Diagnosis Date    CHF (congestive heart failure) (HCC)     Hyperlipidemia     Hypertension     Knee pain     right       Past Surgical History:   Procedure Laterality Date    BACK SURGERY      KNEE SURGERY         History reviewed  No pertinent family history  I have reviewed and agree with the history as documented      Social History     Tobacco Use    Smoking status: Never Smoker    Smokeless tobacco: Never Used   Substance Use Topics    Alcohol use: Never     Frequency: Never    Drug use: Never        Review of Systems   Constitutional: Negative for appetite change, chills, fatigue and fever  HENT: Negative for sneezing and sore throat  Eyes: Negative for visual disturbance  Respiratory: Negative for cough, choking, chest tightness, shortness of breath and wheezing  Cardiovascular: Negative for chest pain and palpitations  Gastrointestinal: Negative for abdominal pain, constipation, diarrhea, nausea and vomiting  Genitourinary: Negative for difficulty urinating and dysuria  Musculoskeletal: Positive for arthralgias  Neurological: Negative for dizziness, weakness, light-headedness, numbness and headaches  All other systems reviewed and are negative  Physical Exam  Physical Exam   Constitutional: She is oriented to person, place, and time  She appears well-developed and well-nourished  No distress  HENT:   Head: Normocephalic and atraumatic  Mouth/Throat: Oropharynx is clear and moist    Eyes: Pupils are equal, round, and reactive to light  EOM are normal    Neck: No JVD present  No tracheal deviation present  Cardiovascular: Normal rate, regular rhythm, normal heart sounds and intact distal pulses  Exam reveals no gallop and no friction rub  No murmur heard  Pulmonary/Chest: Effort normal and breath sounds normal  No respiratory distress  She has no wheezes  She has no rales  Abdominal: Soft  Bowel sounds are normal  She exhibits no distension  There is no tenderness  There is no rebound and no guarding  Musculoskeletal:        Right knee: Tenderness found  Neurological: She is alert and oriented to person, place, and time  No cranial nerve deficit  She exhibits normal muscle tone  Skin: Skin is warm and dry  She is not diaphoretic  No pallor  Psychiatric: She has a normal mood and affect   Her behavior is normal    Nursing note and vitals reviewed  Vital Signs  ED Triage Vitals [01/20/20 1250]   Temp Pulse Respirations Blood Pressure SpO2   -- 74 17 112/54 98 %      Temp src Heart Rate Source Patient Position - Orthostatic VS BP Location FiO2 (%)   -- Monitor -- Right arm --      Pain Score       5           Vitals:    01/20/20 1250 01/20/20 1400   BP: 112/54 115/60   Pulse: 74 79         Visual Acuity      ED Medications  Medications   ketorolac (TORADOL) injection 15 mg (15 mg Intramuscular Given 1/20/20 1512)       Diagnostic Studies  Results Reviewed     None                 XR knee 4+ vw right injury   ED Interpretation by Saran Horowitz MD (01/20 1355)   No acute osseous abnormality      Final Result by Freddie Madsen MD (01/20 6188)      Unremarkable appearance of total knee arthroplasty  Workstation performed: [id number]                    Procedures  Procedures         ED Course                               MDM  Number of Diagnoses or Management Options  Diagnosis management comments: 81-year-old female with acute on chronic right knee pain, will check x-ray, give NSAIDs, reassess  Disposition  Final diagnoses:   Right knee pain     Time reflects when diagnosis was documented in both MDM as applicable and the Disposition within this note     Time User Action Codes Description Comment    1/20/2020  3:57 PM Apolonio Leyden Add [M21 733] Right knee pain       ED Disposition     ED Disposition Condition Date/Time Comment    Discharge Stable Mon Jan 20, 2020  3:57 PM Merritt Gamez discharge to home/self care  Follow-up Information     Follow up With Specialties Details Why Lillie Ganser, MD Family Medicine   [address]  [address]  [address] [address]  [phone]            Patient's Medications   Discharge Prescriptions    IBUPROFEN (MOTRIN) 800 MG TABLET    Take 1 tablet (800 mg total) by mouth 3 (three) times a day       Start Date: 1/20/2020 End Date: --       Order Dose: 800 mg Quantity: 21 tablet    Refills: 0     No discharge procedures on file      ED Provider  Electronically Signed by           July Damon MD  01/20/20 6584

## 2020-01-20 NOTE — ED NOTES
Assisted pt to bathroom, ambulating   Used wheel chair to transport pt back to bed afterwards     Kelsey Frank RN  01/20/20 4535

## 2020-01-20 NOTE — ED NOTES
marin called for pt summer diane [address]   Charge rn number provided and registration number, pt does not have cellphone  Pt waiting in waiting room, registration made aware       Eliseo Everett RN  01/20/20 [address] Jeremiah Faria RN  01/20/20 3954

## 2020-01-26 ENCOUNTER — APPOINTMENT (EMERGENCY)
Dept: ULTRASOUND IMAGING | Facility: HOSPITAL | Age: 85
DRG: 603 | End: 2020-01-26
Payer: COMMERCIAL

## 2020-01-26 ENCOUNTER — APPOINTMENT (EMERGENCY)
Dept: RADIOLOGY | Facility: HOSPITAL | Age: 85
DRG: 603 | End: 2020-01-26
Payer: COMMERCIAL

## 2020-01-26 ENCOUNTER — HOSPITAL ENCOUNTER (INPATIENT)
Facility: HOSPITAL | Age: 85
LOS: 2 days | Discharge: HOME | DRG: 603 | End: 2020-01-28
Attending: EMERGENCY MEDICINE | Admitting: INTERNAL MEDICINE
Payer: COMMERCIAL

## 2020-01-26 DIAGNOSIS — M25.432 PAIN AND SWELLING OF LEFT WRIST: ICD-10-CM

## 2020-01-26 DIAGNOSIS — D72.829 LEUKOCYTOSIS: ICD-10-CM

## 2020-01-26 DIAGNOSIS — M25.512 LEFT SHOULDER PAIN: ICD-10-CM

## 2020-01-26 DIAGNOSIS — L03.114 CELLULITIS OF LEFT WRIST: Primary | ICD-10-CM

## 2020-01-26 DIAGNOSIS — M25.532 PAIN AND SWELLING OF LEFT WRIST: ICD-10-CM

## 2020-01-26 PROBLEM — K21.9 GERD (GASTROESOPHAGEAL REFLUX DISEASE): Status: ACTIVE | Noted: 2020-01-26

## 2020-01-26 PROBLEM — I10 HYPERTENSION: Status: ACTIVE | Noted: 2020-01-26

## 2020-01-26 LAB
ALBUMIN SERPL BCP-MCNC: 3.4 G/DL (ref 3.5–5)
ALP SERPL-CCNC: 79 U/L (ref 46–116)
ALT SERPL W P-5'-P-CCNC: 21 U/L (ref 12–78)
ANION GAP SERPL CALCULATED.3IONS-SCNC: 12 MMOL/L (ref 4–13)
AST SERPL W P-5'-P-CCNC: 20 U/L (ref 5–45)
ATRIAL RATE: 69 BPM
BACTERIA UR QL AUTO: ABNORMAL /HPF
BASOPHILS # BLD AUTO: 0.03 THOUSANDS/ΜL (ref 0–0.1)
BASOPHILS NFR BLD AUTO: 0 % (ref 0–1)
BILIRUB DIRECT SERPL-MCNC: 0.15 MG/DL (ref 0–0.2)
BILIRUB SERPL-MCNC: 0.6 MG/DL (ref 0.2–1)
BILIRUB UR QL STRIP: NEGATIVE
BUN SERPL-MCNC: 27 MG/DL (ref 5–25)
CALCIUM SERPL-MCNC: 9.4 MG/DL (ref 8.3–10.1)
CHLORIDE SERPL-SCNC: 101 MMOL/L (ref 100–108)
CLARITY UR: CLEAR
CO2 SERPL-SCNC: 27 MMOL/L (ref 21–32)
COLOR UR: YELLOW
CREAT SERPL-MCNC: 1.14 MG/DL (ref 0.6–1.3)
CRP SERPL QL: 72.8 MG/L
EOSINOPHIL # BLD AUTO: 0 THOUSAND/ΜL (ref 0–0.61)
EOSINOPHIL NFR BLD AUTO: 0 % (ref 0–6)
ERYTHROCYTE [DISTWIDTH] IN BLOOD BY AUTOMATED COUNT: 12.8 % (ref 11.6–15.1)
ERYTHROCYTE [SEDIMENTATION RATE] IN BLOOD: 94 MM/HOUR (ref 0–20)
GFR SERPL CREATININE-BSD FRML MDRD: 45 ML/MIN/1.73SQ M
GLUCOSE SERPL-MCNC: 153 MG/DL (ref 65–140)
GLUCOSE UR STRIP-MCNC: ABNORMAL MG/DL
HCT VFR BLD AUTO: 40.2 % (ref 34.8–46.1)
HGB BLD-MCNC: 12.7 G/DL (ref 11.5–15.4)
HGB UR QL STRIP.AUTO: ABNORMAL
HYALINE CASTS #/AREA URNS LPF: ABNORMAL /LPF
IMM GRANULOCYTES # BLD AUTO: 0.09 THOUSAND/UL (ref 0–0.2)
IMM GRANULOCYTES NFR BLD AUTO: 1 % (ref 0–2)
KETONES UR STRIP-MCNC: NEGATIVE MG/DL
LACTATE SERPL-SCNC: 1.8 MMOL/L (ref 0.5–2)
LEUKOCYTE ESTERASE UR QL STRIP: NEGATIVE
LYMPHOCYTES # BLD AUTO: 1.03 THOUSANDS/ΜL (ref 0.6–4.47)
LYMPHOCYTES NFR BLD AUTO: 7 % (ref 14–44)
MAGNESIUM SERPL-MCNC: 1.6 MG/DL (ref 1.6–2.6)
MCH RBC QN AUTO: 32.2 PG (ref 26.8–34.3)
MCHC RBC AUTO-ENTMCNC: 31.6 G/DL (ref 31.4–37.4)
MCV RBC AUTO: 102 FL (ref 82–98)
MONOCYTES # BLD AUTO: 1.04 THOUSAND/ΜL (ref 0.17–1.22)
MONOCYTES NFR BLD AUTO: 7 % (ref 4–12)
NEUTROPHILS # BLD AUTO: 12.35 THOUSANDS/ΜL (ref 1.85–7.62)
NEUTS SEG NFR BLD AUTO: 85 % (ref 43–75)
NITRITE UR QL STRIP: NEGATIVE
NON-SQ EPI CELLS URNS QL MICRO: ABNORMAL /HPF
NRBC BLD AUTO-RTO: 0 /100 WBCS
P AXIS: 80 DEGREES
PH UR STRIP.AUTO: 5.5 [PH]
PLATELET # BLD AUTO: 273 THOUSANDS/UL (ref 149–390)
PMV BLD AUTO: 9.9 FL (ref 8.9–12.7)
POTASSIUM SERPL-SCNC: 3.5 MMOL/L (ref 3.5–5.3)
PR INTERVAL: 124 MS
PROT SERPL-MCNC: 8.3 G/DL (ref 6.4–8.2)
PROT UR STRIP-MCNC: ABNORMAL MG/DL
QRS AXIS: 57 DEGREES
QRSD INTERVAL: 76 MS
QT INTERVAL: 362 MS
QTC INTERVAL: 387 MS
RBC # BLD AUTO: 3.94 MILLION/UL (ref 3.81–5.12)
RBC #/AREA URNS AUTO: ABNORMAL /HPF
SODIUM SERPL-SCNC: 140 MMOL/L (ref 136–145)
SP GR UR STRIP.AUTO: 1.01 (ref 1–1.03)
T WAVE AXIS: 67 DEGREES
TROPONIN I SERPL-MCNC: <0.02 NG/ML
URATE SERPL-MCNC: 7.2 MG/DL (ref 2–6.8)
UROBILINOGEN UR QL STRIP.AUTO: 0.2 E.U./DL
VENTRICULAR RATE: 69 BPM
WBC # BLD AUTO: 14.54 THOUSAND/UL (ref 4.31–10.16)
WBC #/AREA URNS AUTO: ABNORMAL /HPF

## 2020-01-26 PROCEDURE — 99285 EMERGENCY DEPT VISIT HI MDM: CPT | Performed by: EMERGENCY MEDICINE

## 2020-01-26 PROCEDURE — 96361 HYDRATE IV INFUSION ADD-ON: CPT

## 2020-01-26 PROCEDURE — 86140 C-REACTIVE PROTEIN: CPT | Performed by: EMERGENCY MEDICINE

## 2020-01-26 PROCEDURE — 83605 ASSAY OF LACTIC ACID: CPT | Performed by: EMERGENCY MEDICINE

## 2020-01-26 PROCEDURE — 86430 RHEUMATOID FACTOR TEST QUAL: CPT | Performed by: EMERGENCY MEDICINE

## 2020-01-26 PROCEDURE — 93005 ELECTROCARDIOGRAM TRACING: CPT

## 2020-01-26 PROCEDURE — 93971 EXTREMITY STUDY: CPT

## 2020-01-26 PROCEDURE — 80076 HEPATIC FUNCTION PANEL: CPT | Performed by: EMERGENCY MEDICINE

## 2020-01-26 PROCEDURE — 85025 COMPLETE CBC W/AUTO DIFF WBC: CPT | Performed by: EMERGENCY MEDICINE

## 2020-01-26 PROCEDURE — 73110 X-RAY EXAM OF WRIST: CPT

## 2020-01-26 PROCEDURE — 96365 THER/PROPH/DIAG IV INF INIT: CPT

## 2020-01-26 PROCEDURE — 36415 COLL VENOUS BLD VENIPUNCTURE: CPT | Performed by: EMERGENCY MEDICINE

## 2020-01-26 PROCEDURE — 96367 TX/PROPH/DG ADDL SEQ IV INF: CPT

## 2020-01-26 PROCEDURE — 86038 ANTINUCLEAR ANTIBODIES: CPT | Performed by: EMERGENCY MEDICINE

## 2020-01-26 PROCEDURE — 73030 X-RAY EXAM OF SHOULDER: CPT

## 2020-01-26 PROCEDURE — 80048 BASIC METABOLIC PNL TOTAL CA: CPT | Performed by: EMERGENCY MEDICINE

## 2020-01-26 PROCEDURE — 85652 RBC SED RATE AUTOMATED: CPT | Performed by: EMERGENCY MEDICINE

## 2020-01-26 PROCEDURE — 99285 EMERGENCY DEPT VISIT HI MDM: CPT

## 2020-01-26 PROCEDURE — 87040 BLOOD CULTURE FOR BACTERIA: CPT | Performed by: EMERGENCY MEDICINE

## 2020-01-26 PROCEDURE — 84550 ASSAY OF BLOOD/URIC ACID: CPT | Performed by: INTERNAL MEDICINE

## 2020-01-26 PROCEDURE — 93010 ELECTROCARDIOGRAM REPORT: CPT | Performed by: INTERNAL MEDICINE

## 2020-01-26 PROCEDURE — 84484 ASSAY OF TROPONIN QUANT: CPT | Performed by: EMERGENCY MEDICINE

## 2020-01-26 PROCEDURE — 71046 X-RAY EXAM CHEST 2 VIEWS: CPT

## 2020-01-26 PROCEDURE — 96375 TX/PRO/DX INJ NEW DRUG ADDON: CPT

## 2020-01-26 PROCEDURE — 81001 URINALYSIS AUTO W/SCOPE: CPT | Performed by: EMERGENCY MEDICINE

## 2020-01-26 PROCEDURE — 83735 ASSAY OF MAGNESIUM: CPT | Performed by: EMERGENCY MEDICINE

## 2020-01-26 PROCEDURE — 99223 1ST HOSP IP/OBS HIGH 75: CPT | Performed by: INTERNAL MEDICINE

## 2020-01-26 PROCEDURE — 86618 LYME DISEASE ANTIBODY: CPT | Performed by: EMERGENCY MEDICINE

## 2020-01-26 RX ORDER — VANCOMYCIN HYDROCHLORIDE 1 G/200ML
15 INJECTION, SOLUTION INTRAVENOUS EVERY 12 HOURS
Status: DISCONTINUED | OUTPATIENT
Start: 2020-01-26 | End: 2020-01-26

## 2020-01-26 RX ORDER — VANCOMYCIN HYDROCHLORIDE 1 G/200ML
15 INJECTION, SOLUTION INTRAVENOUS ONCE
Status: COMPLETED | OUTPATIENT
Start: 2020-01-26 | End: 2020-01-26

## 2020-01-26 RX ORDER — ATORVASTATIN CALCIUM 10 MG/1
10 TABLET, FILM COATED ORAL DAILY
Status: DISCONTINUED | OUTPATIENT
Start: 2020-01-27 | End: 2020-01-28 | Stop reason: HOSPADM

## 2020-01-26 RX ORDER — PANTOPRAZOLE SODIUM 40 MG/1
40 TABLET, DELAYED RELEASE ORAL
Status: DISCONTINUED | OUTPATIENT
Start: 2020-01-27 | End: 2020-01-28 | Stop reason: HOSPADM

## 2020-01-26 RX ORDER — ONDANSETRON 2 MG/ML
4 INJECTION INTRAMUSCULAR; INTRAVENOUS EVERY 6 HOURS PRN
Status: DISCONTINUED | OUTPATIENT
Start: 2020-01-26 | End: 2020-01-28 | Stop reason: HOSPADM

## 2020-01-26 RX ORDER — ACETAMINOPHEN 325 MG/1
975 TABLET ORAL EVERY 8 HOURS SCHEDULED
Status: DISCONTINUED | OUTPATIENT
Start: 2020-01-26 | End: 2020-01-28 | Stop reason: HOSPADM

## 2020-01-26 RX ORDER — HEPARIN SODIUM 5000 [USP'U]/ML
5000 INJECTION, SOLUTION INTRAVENOUS; SUBCUTANEOUS EVERY 8 HOURS SCHEDULED
Status: DISCONTINUED | OUTPATIENT
Start: 2020-01-26 | End: 2020-01-28 | Stop reason: HOSPADM

## 2020-01-26 RX ORDER — LISINOPRIL 20 MG/1
20 TABLET ORAL DAILY
Status: DISCONTINUED | OUTPATIENT
Start: 2020-01-27 | End: 2020-01-28 | Stop reason: HOSPADM

## 2020-01-26 RX ADMIN — CEFEPIME HYDROCHLORIDE 2000 MG: 2 INJECTION, POWDER, FOR SOLUTION INTRAVENOUS at 15:23

## 2020-01-26 RX ADMIN — HEPARIN SODIUM 5000 UNITS: 5000 INJECTION INTRAVENOUS; SUBCUTANEOUS at 21:46

## 2020-01-26 RX ADMIN — VANCOMYCIN HYDROCHLORIDE 1000 MG: 1 INJECTION, SOLUTION INTRAVENOUS at 16:06

## 2020-01-26 RX ADMIN — MORPHINE SULFATE 2 MG: 2 INJECTION, SOLUTION INTRAMUSCULAR; INTRAVENOUS at 14:44

## 2020-01-26 RX ADMIN — ACETAMINOPHEN 975 MG: 325 TABLET, FILM COATED ORAL at 21:45

## 2020-01-26 RX ADMIN — SODIUM CHLORIDE 1000 ML: 0.9 INJECTION, SOLUTION INTRAVENOUS at 14:41

## 2020-01-26 NOTE — ED NOTES
Patient transported to [facility] [address], RN  01/26/20 6092

## 2020-01-26 NOTE — H&P
H&P- Anabelle Blake 1935, 81 y o  female MRN: [id number]    Unit/Bed#: ED 11 Encounter: [id number]    Primary Care Provider: Bryan Carlson MD   Date and time admitted to hospital: 1/26/2020  1:13 PM        GERD (gastroesophageal reflux disease)  Assessment & Plan  Continue PPI    Hypertension  Assessment & Plan  Blood pressure appears to be stable  Patient denies taking any pills however she should be on lisinopril on daily basis  Will continue lisinopril  Continue to monitor blood pressure closely    Cellulitis of left wrist  Assessment & Plan  X-ray of the wrist, preliminary report showed no evidence of acute nauseous abnormality  Final read by Radiology pending  Clinically appears to have cellulitis  Cannot rule out septic arthritis  Will consult orthopedics  Check uric acid level  Will continue vancomycin and cefepime    Id consult will be appreciated  Monitor CBC and temps      VTE Prophylaxis: Heparin  / sequential compression device   Code Status:  Full code  Patient understood and said she does not want to be back  POLST: POLST form is not discussed and not completed at this time  Discussion with family:  Patient    Anticipated Length of Stay:  Patient will be admitted on an Inpatient basis with an anticipated length of stay of   2 midnights  Justification for Hospital Stay:  Left wrist cellulitis    Total Time for Visit, including Counseling / Coordination of Care: 30 minutes  Greater than 50% of this total time spent on direct patient counseling and coordination of care  Chief Complaint:       History of Present Illness:    Anabelle Blake is a 81 y o  female with past medical history hypertension hyperlipidemia, congestive heart failure who presents with left shoulder pain  Patient is primarily [place] speaking, a poor historian, history and medication list obtain more by review of chart and discussing with the ED providers    As per ED, she came by EMS with complaints of left shoulder pain and also difficulty lifting her shoulder and arm due to pain  Upon EMS presentation patient was noted to have fever of 101 however patient refuses any fevers or chills  She denies any complaints other than arm pain  She also reports not taking any medicines on daily basis other than Tylenol as needed  As per Care everywhere and her last office visit with PCP patient is supposed to be on lisinopril aspirin and as needed Lasix  Reports her family or all in [facility] she lives with her friend here  Review of Systems:    Review of Systems   Constitutional: Negative for chills and fever  Respiratory: Negative for cough, chest tightness and shortness of breath  Cardiovascular: Negative for leg swelling  Gastrointestinal: Negative for abdominal distention, constipation and diarrhea  Genitourinary: Negative for difficulty urinating and dysuria  Musculoskeletal: Positive for myalgias  Left wrist cellulitis pain   Neurological: Negative for dizziness, weakness and light-headedness  Past Medical and Surgical History:     Past Medical History:   Diagnosis Date    CHF (congestive heart failure) ([facility] [address] )     Hyperlipidemia     Hypertension     Knee pain     right       Past Surgical History:   Procedure Laterality Date    BACK SURGERY      KNEE SURGERY         Meds/Allergies:    Prior to Admission medications    Medication Sig Start Date End Date Taking?  Authorizing Provider   atorvastatin (LIPITOR) 10 mg tablet Take 10 mg by mouth daily    Historical Provider, MD   clopidogrel (PLAVIX) 75 mg tablet Take 75 mg by mouth daily    Historical Provider, MD   lisinopril (ZESTRIL) 20 mg tablet Take 20 mg by mouth daily    Historical Provider, MD   omeprazole (PriLOSEC) 40 MG capsule Take 40 mg by mouth daily    Historical Provider, MD   tolterodine (DETROL LA) 4 mg 24 hr capsule Take 4 mg by mouth daily    Historical Provider, MD   ibuprofen (MOTRIN) 800 mg tablet Take 1 tablet (800 mg total) by mouth 3 (three) times a day 1/20/20 1/26/20  Joshua Leigh MD   naproxen (NAPROSYN) 500 mg tablet Take 1 tablet (500 mg total) by mouth 2 (two) times a day with meals for 10 days 5/16/18 1/26/20  Lieutenant Siddharth DO   oxyCODONE-acetaminophen (PERCOCET) 5-325 mg per tablet Take 1 tablet by mouth every 4 (four) hours as needed for moderate pain for up to 20 dosesMax Daily Amount: 6 tablets 9/30/19 1/26/20  Kemi Spencer MD   traMADol Sheryl Zion) 50 mg tablet Take 50 mg by mouth every 6 (six) hours as needed for moderate pain  1/26/20  Historical Provider, MD BRISCOE have reviewed home medications using allscripts  Epic    Allergies: No Known Allergies    Social History:     Marital Status:    Occupation: Patient Pre-hospital Living Situation:   Patient Pre-hospital Level of Mobility:   Patient Pre-hospital Diet Restrictions:   Substance Use History:   Social History     Substance and Sexual Activity   Alcohol Use Never    Frequency: Never     Social History     Tobacco Use   Smoking Status Never Smoker   Smokeless Tobacco Never Used     Social History     Substance and Sexual Activity   Drug Use Never       Family History:    History reviewed  No pertinent family history  Physical Exam:     Vitals:   Blood Pressure: 153/69 (01/26/20 1700)  Pulse: 73 (01/26/20 1700)  Temperature: 99 7 °F (37 6 °C) (01/26/20 1324)  Temp Source: Oral (01/26/20 1324)  Respirations: 18 (01/26/20 1700)  Height: 5' 1" (154 9 cm) (01/26/20 1324)  Weight - Scale: 73 7 kg (162 lb 7 7 oz) (01/26/20 1324)  SpO2: 98 % (01/26/20 1700)    Physical Exam   Constitutional: She is oriented to person, place, and time  She appears well-developed and well-nourished  HENT:   Head: Normocephalic and atraumatic  Eyes: Pupils are equal, round, and reactive to light  EOM are normal    Neck: Normal range of motion  Neck supple  Cardiovascular: Normal rate and regular rhythm     Pulmonary/Chest: Effort normal and breath sounds normal  No respiratory distress  Abdominal: Soft  Bowel sounds are normal    Musculoskeletal: Normal range of motion  She exhibits tenderness  Area of erythema over the left wrist, tender and warm to touch  Slightly decreased wrist movement   Neurological: She is alert and oriented to person, place, and time  Skin: Skin is warm  Rash noted  Additional Data:     Lab Results: I have personally reviewed pertinent reports  Results from last 7 days   Lab Units 01/26/20  1436   WBC Thousand/uL 14 54*   HEMOGLOBIN g/dL 12 7   HEMATOCRIT % 40 2   PLATELETS Thousands/uL 273   NEUTROS PCT % 85*   LYMPHS PCT % 7*   MONOS PCT % 7   EOS PCT % 0     Results from last 7 days   Lab Units 01/26/20  1436   SODIUM mmol/L 140   POTASSIUM mmol/L 3 5   CHLORIDE mmol/L 101   CO2 mmol/L 27   BUN mg/dL 27*   CREATININE mg/dL 1 14   ANION GAP mmol/L 12   CALCIUM mg/dL 9 4   ALBUMIN g/dL 3 4*   TOTAL BILIRUBIN mg/dL 0 60   ALK PHOS U/L 79   ALT U/L 21   AST U/L 20   GLUCOSE RANDOM mg/dL 153*                 Results from last 7 days   Lab Units 01/26/20  1436   LACTIC ACID mmol/L 1 8       Imaging: I have personally reviewed pertinent reports  XR chest 2 views   ED Interpretation by Araceli Foley DO (01/26 1609)   No acute abnormality in the chest       XR wrist 3+ views LEFT   ED Interpretation by Araceli Foley DO (01/26 1609)   No acute osseous abnormality  XR shoulder 2+ views LEFT   ED Interpretation by Araceli Foley DO (01/26 1609)   No acute osseous abnormality  VAS upper limb venous duplex scan, unilateral/limited    (Results Pending)       EKG, Pathology, and Other Studies Reviewed on Admission:   · EKG:     Allscripts / Epic Records Reviewed: Yes     ** Please Note: This note has been constructed using a voice recognition system   **

## 2020-01-26 NOTE — ED PROVIDER NOTES
History  Chief Complaint   Patient presents with    Arm Pain     Patient c/o left shoulder and arm pain, denies injury  Area of redness and swelling noted to left hand/wrist   Patient denies chest pain or SOB  Patient is an 71-year-old female with past medical history of hypertension, hyperlipidemia, congestive heart failure, presents to the emergency department by ambulance for left shoulder pain  Patient reports she started having left shoulder pain yesterday that radiates down her arm  She has noticed difficulty lifting her shoulder and moving her arm in general due to pain  She reports her left hand and wrist have been swollen and red  She denies ever having this type of problem before  According to EMS, they were called for shortness of breath but patient denies any shortness of breath  They also noted a 101 fever however patient adamantly denies any fever or chills  She denies any headache, dizziness or near syncope, cough, URI symptoms, chest pain, palpitations, dyspnea, wheezing, abdominal pain, nausea, vomiting, diarrhea, constipation, blood per rectum or melena, dysuria, change in urinary frequency, hematuria, flank pain, extremity paresthesia or other focal neurologic deficits  Patient denies any known history of autoimmune or inflammatory arthritis  Patient denies any recent injury or fall  History provided by:  Patient and EMS personnel   used: No    Arm Pain   Associated symptoms: no abdominal pain, no chest pain, no congestion, no cough, no diarrhea, no ear pain, no fever, no headaches, no nausea, no rash, no rhinorrhea, no shortness of breath, no sore throat, no vomiting and no wheezing        Prior to Admission Medications   Prescriptions Last Dose Informant Patient Reported?  Taking?   atorvastatin (LIPITOR) 10 mg tablet Not Taking at Unknown time  Yes No   Sig: Take 10 mg by mouth daily   clopidogrel (PLAVIX) 75 mg tablet Not Taking at Unknown time  Yes No Sig: Take 75 mg by mouth daily   lisinopril (ZESTRIL) 20 mg tablet Not Taking at Unknown time  Yes No   Sig: Take 20 mg by mouth daily   omeprazole (PriLOSEC) 40 MG capsule Not Taking at Unknown time  Yes No   Sig: Take 40 mg by mouth daily   tolterodine (DETROL LA) 4 mg 24 hr capsule Not Taking at Unknown time  Yes No   Sig: Take 4 mg by mouth daily      Facility-Administered Medications: None       Past Medical History:   Diagnosis Date    CHF (congestive heart failure) (HCC)     Hyperlipidemia     Hypertension     Knee pain     right       Past Surgical History:   Procedure Laterality Date    BACK SURGERY      KNEE SURGERY         History reviewed  No pertinent family history  I have reviewed and agree with the history as documented  Social History     Tobacco Use    Smoking status: Never Smoker    Smokeless tobacco: Never Used   Substance Use Topics    Alcohol use: Never     Frequency: Never    Drug use: Never        Review of Systems   Constitutional: Negative for chills and fever  HENT: Negative for congestion, ear pain, rhinorrhea and sore throat  Respiratory: Negative for cough, chest tightness, shortness of breath and wheezing  Cardiovascular: Negative for chest pain, palpitations and leg swelling  Gastrointestinal: Negative for abdominal pain, constipation, diarrhea, nausea and vomiting  Genitourinary: Negative for dysuria, flank pain, frequency and hematuria  Musculoskeletal: Positive for arthralgias and joint swelling  Negative for back pain and neck pain  +Left shoulder and arm pain  +Left wrist and hand swelling  Skin: Positive for color change  Negative for pallor and rash  +Redness/warmth left wrist   Allergic/Immunologic: Negative for immunocompromised state  Neurological: Negative for dizziness, syncope, weakness, light-headedness, numbness and headaches  Hematological: Negative for adenopathy     Psychiatric/Behavioral: Negative for confusion and decreased concentration  All other systems reviewed and are negative  Physical Exam  Physical Exam   Constitutional: She is oriented to person, place, and time  She appears well-developed and well-nourished  No distress  HENT:   Head: Normocephalic and atraumatic  Mouth/Throat: Oropharynx is clear and moist    Eyes: Pupils are equal, round, and reactive to light  Conjunctivae and EOM are normal    Neck: Normal range of motion  Neck supple  No JVD present  Cardiovascular: Normal rate, regular rhythm, normal heart sounds and intact distal pulses  Exam reveals no gallop and no friction rub  No murmur heard  Pulmonary/Chest: Effort normal and breath sounds normal  No respiratory distress  She has no wheezes  She has no rales  Abdominal: Soft  Bowel sounds are normal  She exhibits no distension  There is no tenderness  There is no rebound and no guarding  Musculoskeletal: She exhibits edema and tenderness  LEFT UPPER EXTREMITY:  There is tenderness in the left shoulder and upper arm  There is tenderness, swelling as well as erythema and warmth over the left wrist joint, more so over the radial aspect  No obvious proximal streaking  Left hand is swollen  2+ radial pulse  No palpable cords  Neurological: She is alert and oriented to person, place, and time  No cranial nerve deficit  No gross motor or sensory deficits  There is weakness with left hand  and upper extremity strength due to pain  Skin: Skin is warm and dry  No rash noted  She is not diaphoretic  No erythema  No pallor  Psychiatric: She has a normal mood and affect  Her behavior is normal    Nursing note and vitals reviewed        Vital Signs  ED Triage Vitals [01/26/20 1324]   Temperature Pulse Respirations Blood Pressure SpO2   99 7 °F (37 6 °C) 62 20 151/65 100 %      Temp Source Heart Rate Source Patient Position - Orthostatic VS BP Location FiO2 (%)   Oral Monitor Lying Right arm --      Pain Score       8 Vitals:    01/26/20 1442 01/26/20 1524 01/26/20 1530 01/26/20 1609   BP: 166/71 169/70 (!) 171/74 148/68   BP Location: Right arm Right arm Right arm Right arm   Pulse: 68 73 75 71   Resp: 18 18 20 18   Temp:       TempSrc:       SpO2: 98% 98% 97% 99%   Weight:       Height:           Visual Acuity      ED Medications  Medications   vancomycin (VANCOCIN) IVPB (premix) 1,000 mg (1,000 mg Intravenous New Bag 1/26/20 1606)   sodium chloride 0 9 % bolus 1,000 mL (0 mL Intravenous Stopped 1/26/20 1600)   morphine injection 2 mg (2 mg Intravenous Given 1/26/20 1444)   cefepime (MAXIPIME) 2,000 mg in dextrose 5 % 50 mL IVPB (0 mg Intravenous Stopped 1/26/20 1600)       Diagnostic Studies  Results Reviewed     Procedure Component Value Units Date/Time    Lyme Antibody Profile with reflex to [facility] [[id number]] Collected:  01/26/20 1606    Lab Status: In process Specimen:  Blood from Arm, Right Updated:  01/26/20 1608    Sedimentation rate, automated [[id number]]  (Abnormal) Collected:  01/26/20 1436    Lab Status:  Final result Specimen:  Blood from Arm, Right Updated:  01/26/20 1600     Sed Rate 94 mm/hour     Hepatic function panel [[id number]]  (Abnormal) Collected:  01/26/20 1436    Lab Status:  Final result Specimen:  Blood from Arm, Right Updated:  01/26/20 1534     Total Bilirubin 0 60 mg/dL      Bilirubin, Direct 0 15 mg/dL      Alkaline Phosphatase 79 U/L      AST 20 U/L      ALT 21 U/L      Total Protein 8 3 g/dL      Albumin 3 4 g/dL     Magnesium [[id number]]  (Normal) Collected:  01/26/20 1436    Lab Status:  Final result Specimen:  Blood from Arm, Right Updated:  01/26/20 1534     Magnesium 1 6 mg/dL     C-reactive protein [[id number]]  (Abnormal) Collected:  01/26/20 1436    Lab Status:  Final result Specimen:  Blood from Arm, Right Updated:  01/26/20 1534     CRP 72 8 mg/L     Blood culture #2 [[id number]] Collected:  01/26/20 1521    Lab Status:   In process Specimen:  Blood from Arm, Left Updated:  01/26/20 1524 Urine Microscopic [[id number]]  (Abnormal) Collected:  01/26/20 1427    Lab Status:  Final result Specimen:  Urine Updated:  01/26/20 1511     RBC, UA 4-10 /hpf      WBC, UA None Seen /hpf      Epithelial Cells Occasional /hpf      Bacteria, UA Occasional /hpf      Hyaline Casts, UA 0-1 /lpf     JIM Screen w/ Reflex to Titer/Pattern [[id number]] Collected:  01/26/20 1436    Lab Status: In process Specimen:  Blood from Arm, Right Updated:  01/26/20 1505    Lactic acid, plasma [[id number]]  (Normal) Collected:  01/26/20 1436    Lab Status:  Final result Specimen:  Blood from Arm, Right Updated:  01/26/20 1504     LACTIC ACID 1 8 mmol/L     Narrative:       Result may be elevated if tourniquet was used during collection      Troponin I [[id number]]  (Normal) Collected:  01/26/20 1436    Lab Status:  Final result Specimen:  Blood from Arm, Right Updated:  01/26/20 1504     Troponin I <0 02 ng/mL     Basic metabolic panel [[id number]]  (Abnormal) Collected:  01/26/20 1436    Lab Status:  Final result Specimen:  Blood from Arm, Right Updated:  01/26/20 1502     Sodium 140 mmol/L      Potassium 3 5 mmol/L      Chloride 101 mmol/L      CO2 27 mmol/L      ANION GAP 12 mmol/L      BUN 27 mg/dL      Creatinine 1 14 mg/dL      Glucose 153 mg/dL      Calcium 9 4 mg/dL      eGFR 45 ml/min/1 73sq m     Narrative:       Salty guidelines for Chronic Kidney Disease (CKD):     Stage 1 with normal or high GFR (GFR > 90 mL/min/1 73 square meters)    Stage 2 Mild CKD (GFR = 60-89 mL/min/1 73 square meters)    Stage 3A Moderate CKD (GFR = 45-59 mL/min/1 73 square meters)    Stage 3B Moderate CKD (GFR = 30-44 mL/min/1 73 square meters)    Stage 4 Severe CKD (GFR = 15-29 mL/min/1 73 square meters)    Stage 5 End Stage CKD (GFR <15 mL/min/1 73 square meters)  Note: GFR calculation is accurate only with a steady state creatinine    CBC and differential [[id number]]  (Abnormal) Collected:  01/26/20 1436    Lab Status:  Final result Specimen:  Blood from Arm, Right Updated:  01/26/20 1444     WBC 14 54 Thousand/uL      RBC 3 94 Million/uL      Hemoglobin 12 7 g/dL      Hematocrit 40 2 %       fL      MCH 32 2 pg      MCHC 31 6 g/dL      RDW 12 8 %      MPV 9 9 fL      Platelets 173 Thousands/uL      nRBC 0 /100 WBCs      Neutrophils Relative 85 %      Immat GRANS % 1 %      Lymphocytes Relative 7 %      Monocytes Relative 7 %      Eosinophils Relative 0 %      Basophils Relative 0 %      Neutrophils Absolute 12 35 Thousands/µL      Immature Grans Absolute 0 09 Thousand/uL      Lymphocytes Absolute 1 03 Thousands/µL      Monocytes Absolute 1 04 Thousand/µL      Eosinophils Absolute 0 00 Thousand/µL      Basophils Absolute 0 03 Thousands/µL     Blood culture #1 [[id number]] Collected:  01/26/20 1436    Lab Status: In process Specimen:  Blood from Arm, Right Updated:  01/26/20 1440    Rheumatoid factor screen [[id number]] Collected:  01/26/20 1436    Lab Status: In process Specimen:  Blood from Arm, Right Updated:  01/26/20 1440    UA (URINE) with reflex to Scope [[id number]]  (Abnormal) Collected:  01/26/20 1427    Lab Status:  Final result Specimen:  Urine Updated:  01/26/20 1431     Color, UA Yellow     Clarity, UA Clear     Specific Gravity, UA 1 010     pH, UA 5 5     Leukocytes, UA Negative     Nitrite, UA Negative     Protein, UA Trace mg/dl      Glucose,  (1/10%) mg/dl      Ketones, UA Negative mg/dl      Urobilinogen, UA 0 2 E U /dl      Bilirubin, UA Negative     Blood, UA Small                 XR chest 2 views   ED Interpretation by Javy Barraza DO (01/26 1609)   No acute abnormality in the chest       XR wrist 3+ views LEFT   ED Interpretation by Javy Barraza DO (01/26 1609)   No acute osseous abnormality  XR shoulder 2+ views LEFT   ED Interpretation by Javy Barraza DO (01/26 1609)   No acute osseous abnormality        VAS upper limb venous duplex scan, unilateral/limited    (Results Pending)              Procedures  ECG 12 Lead Documentation Only  Date/Time: 1/26/2020 2:42 PM  Performed by: Marc Amador DO  Authorized by: Marc Amador DO     ECG reviewed by me, the ED Provider: yes    Patient location:  ED  Previous ECG:     Previous ECG:  Compared to current    Comparison ECG info:  4-22-19; PACs are now present  Rate:     ECG rate:  69    ECG rate assessment: normal    Rhythm:     Rhythm: sinus rhythm    Ectopy:     Ectopy: PAC    QRS:     QRS axis:  Normal    QRS intervals:  Normal  Conduction:     Conduction: normal    ST segments:     ST segments:  Normal  T waves:     T waves: normal               ED Course  ED Course as of Jan 26 1635   Sun Jan 26, 2020   1437 Nitrite, UA: Negative   1437 Leukocytes, UA: Negative   1508 Troponin I: <0 02   1508 LACTIC ACID: 1 8   1512 US tech reported venous duplex was negative for LUE DVT  Identification of Seniors at Risk      Most Recent Value   (ISAR) Identification of Seniors at Risk   Before the illness or injury that brought you to the Emergency, did you need someone to help you on a regular basis? 0 Filed at: 01/26/2020 1327   In the last 24 hours, have you needed more help than usual?  0 Filed at: 01/26/2020 1327   Have you been hospitalized for one or more nights during the past 6 months? 0 Filed at: 01/26/2020 1327   In general, do you see well?  0 Filed at: 01/26/2020 1327   In general, do you have serious problems with your memory? 0 Filed at: 01/26/2020 1327   Do you take more than three different medications every day? 1 Filed at: 01/26/2020 1327   ISAR Score  1 Filed at: 01/26/2020 1327              Initial Sepsis Screening     Row Name 01/26/20 1633                Is the patient's history suggestive of a new or worsening infection?   (!) Yes (Proceed)  -MA        Suspected source of infection  soft tissue;infected joint  -MA        Are two or more of the following signs & symptoms of infection both present and new to the patient? No  -MA        Indicate SIRS criteria  Leukocytosis (WBC > 30894 IJL)  -MA        If the answer is yes to both questions, suspicion of sepsis is present          If severe sepsis is present AND tissue hypoperfusion perists in the hour after fluid resuscitation or lactate > 4, the patient meets criteria for SEPTIC SHOCK          Are any of the following organ dysfunction criteria present within 6 hours of suspected infection and SIRS criteria that are NOT considered to be chronic conditions? No  -MA        Organ dysfunction          Date of presentation of severe sepsis          Time of presentation of severe sepsis          Tissue hypoperfusion persists in the hour after crystalloid fluid administration, evidenced, by either:          Was hypotension present within one hour of the conclusion of crystalloid fluid administration?         Date of presentation of septic shock          Time of presentation of septic shock            User Key  (r) = Recorded By, (t) = Taken By, (c) = Cosigned By    Initials Name Provider Type    JOSIAS Garcia DO Physician                  MDM  Number of Diagnoses or Management Options  Diagnosis management comments: 81-year-old female presents to the ED with left arm pain and on exam has evidence of an erythematous, warm swollen left wrist as well as decreased mobility and strength in the left arm  Most likely patient has cellulitis, possibly septic arthritis  Differential includes inflammatory or autoimmune arthritis  Will workup with cardiac and septic labs, rheumatoid factor, JIM screen, CRP and ESR  Will start broad-spectrum antibiotics and likely recommend admission for orthopedic consultation  Although septic joint is considered, there is definite cellulitis around the joint so arthrocentesis currently contraindicated    Will obtain x-rays of the left shoulder and wrist        Amount and/or Complexity of Data Reviewed  Clinical lab tests: reviewed and ordered  Tests in the radiology section of CPT®: ordered and reviewed  Tests in the medicine section of CPT®: ordered and reviewed  Independent visualization of images, tracings, or specimens: yes          Disposition  Final diagnoses:   Cellulitis of left wrist   Leukocytosis   Pain and swelling of left wrist - r/o septic arthritis   Left shoulder pain     Time reflects when diagnosis was documented in both MDM as applicable and the Disposition within this note     Time User Action Codes Description Comment    1/26/2020  4:32 PM Marvin Cullens Add [M79 602] Left arm pain     1/26/2020  4:32 PM Johnice Taisha E Add [L03 114] Cellulitis of left wrist     1/26/2020  4:32 PM Marvin Cullens Add [D72 829] Leukocytosis     1/26/2020  4:33 PM Marvin Cullens Add [V82 325,  M25 432] Pain and swelling of left wrist     1/26/2020  4:33 PM Marvin Cullens Modify [L03 114] Cellulitis of left wrist     1/26/2020  4:33 PM Clarenceice Taisha E Remove [O45 629] Left arm pain     1/26/2020  4:33 PM Marvin Cullens Add [Z98 073] Left shoulder pain     1/26/2020  4:33 PM Marvin Cullens Modify [V88 226,  M25 432] Pain and swelling of left wrist r/o septic arthritis      ED Disposition     ED Disposition Condition Date/Time Comment    Admit Stable Sun Jan 26, 2020  4:32 PM Case was discussed with VLADIMIR and the patient's admission status was agreed to be Admission Status: inpatient status to the service of Dr Ifrah Sutton   Follow-up Information    None         Patient's Medications   Discharge Prescriptions    No medications on file     No discharge procedures on file      ED Provider  Electronically Signed by           Bernardo Brown DO  01/26/20 5600

## 2020-01-26 NOTE — ASSESSMENT & PLAN NOTE
X-ray of the wrist, preliminary report showed no evidence of acute nauseous abnormality  Final read by Radiology pending  Clinically appears to have cellulitis    Cannot rule out septic arthritis  Will consult orthopedics  Check uric acid level  Will continue vancomycin and cefepime    Id consult will be appreciated  Monitor CBC and temps

## 2020-01-26 NOTE — SEPSIS NOTE
Sepsis Note   Johny Brice 81 y o  female MRN: [id number]  Unit/Bed#: ED 11 Encounter: [id number]      qSOFA     Row Name 01/26/20 1609 01/26/20 1530 01/26/20 1524 01/26/20 1442 01/26/20 1400    Altered mental status GCS < 15              Respiratory Rate > / =22  0  0  0  0  0    Systolic BP < / =999  0  0  0  0  0    Q Sofa Score  0  0  0  0  0    Row Name 01/26/20 1324                Altered mental status GCS < 15          Respiratory Rate > / =16  0        Systolic BP < / =207  0        Q Sofa Score  0            Initial Sepsis Screening     Row Name 01/26/20 1633                Is the patient's history suggestive of a new or worsening infection? (!) Yes (Proceed)  -MA        Suspected source of infection  soft tissue;infected joint  -MA        Are two or more of the following signs & symptoms of infection both present and new to the patient? No  -MA        Indicate SIRS criteria  Leukocytosis (WBC > 10263 IJL)  -MA        If the answer is yes to both questions, suspicion of sepsis is present          If severe sepsis is present AND tissue hypoperfusion perists in the hour after fluid resuscitation or lactate > 4, the patient meets criteria for SEPTIC SHOCK          Are any of the following organ dysfunction criteria present within 6 hours of suspected infection and SIRS criteria that are NOT considered to be chronic conditions? No  -MA        Organ dysfunction          Date of presentation of severe sepsis          Time of presentation of severe sepsis          Tissue hypoperfusion persists in the hour after crystalloid fluid administration, evidenced, by either:          Was hypotension present within one hour of the conclusion of crystalloid fluid administration?           Date of presentation of septic shock          Time of presentation of septic shock            User Key  (r) = Recorded By, (t) = Taken By, (c) = Cosigned By    [address] Name Provider Type    JOSIAS Foley,  Physician

## 2020-01-26 NOTE — ED NOTES
1  CC - left arm pain/redness  2  Admission related to injury? - no  3  Orientation status - a/o x's 3 (speaks [place], understands some English)  4  Abnormal labs/abnormal focused assessment/vitals - uric acid, sed rate, CRP elevated  5  Medication/drips - n/a  6  Last time narcotics given - Morphine 2 mg ivp @ 1444  7  IV lines/drains/etc - 20 gauge right AC  8  Isolation status - none  9  Skin - intact  10  Ambulation - walks with assist x's 1   11   ED nurse's name and phone number - [facility] ext  [id number] [facility]osticsE[facility] Centerpriyank Campbell, MIGUEL ANGEL  01/26/20 0336

## 2020-01-26 NOTE — ASSESSMENT & PLAN NOTE
Blood pressure appears to be stable  Patient denies taking any pills however she should be on lisinopril on daily basis  Will continue lisinopril  Continue to monitor blood pressure closely

## 2020-01-27 LAB
ANION GAP SERPL CALCULATED.3IONS-SCNC: 9 MMOL/L (ref 4–13)
BUN SERPL-MCNC: 23 MG/DL (ref 5–25)
CALCIUM SERPL-MCNC: 8.8 MG/DL (ref 8.3–10.1)
CHLORIDE SERPL-SCNC: 109 MMOL/L (ref 100–108)
CO2 SERPL-SCNC: 24 MMOL/L (ref 21–32)
CREAT SERPL-MCNC: 1.11 MG/DL (ref 0.6–1.3)
ERYTHROCYTE [DISTWIDTH] IN BLOOD BY AUTOMATED COUNT: 12.8 % (ref 11.6–15.1)
GFR SERPL CREATININE-BSD FRML MDRD: 46 ML/MIN/1.73SQ M
GLUCOSE SERPL-MCNC: 102 MG/DL (ref 65–140)
HCT VFR BLD AUTO: 33.4 % (ref 34.8–46.1)
HGB BLD-MCNC: 10.7 G/DL (ref 11.5–15.4)
MCH RBC QN AUTO: 33.1 PG (ref 26.8–34.3)
MCHC RBC AUTO-ENTMCNC: 32 G/DL (ref 31.4–37.4)
MCV RBC AUTO: 103 FL (ref 82–98)
PLATELET # BLD AUTO: 192 THOUSANDS/UL (ref 149–390)
PMV BLD AUTO: 10.1 FL (ref 8.9–12.7)
POTASSIUM SERPL-SCNC: 3.6 MMOL/L (ref 3.5–5.3)
RBC # BLD AUTO: 3.23 MILLION/UL (ref 3.81–5.12)
RHEUMATOID FACT SER QL LA: NEGATIVE
RYE IGE QN: NEGATIVE
SODIUM SERPL-SCNC: 142 MMOL/L (ref 136–145)
WBC # BLD AUTO: 10.3 THOUSAND/UL (ref 4.31–10.16)

## 2020-01-27 PROCEDURE — 80048 BASIC METABOLIC PNL TOTAL CA: CPT | Performed by: INTERNAL MEDICINE

## 2020-01-27 PROCEDURE — 85027 COMPLETE CBC AUTOMATED: CPT | Performed by: INTERNAL MEDICINE

## 2020-01-27 PROCEDURE — 99223 1ST HOSP IP/OBS HIGH 75: CPT | Performed by: INTERNAL MEDICINE

## 2020-01-27 PROCEDURE — 93971 EXTREMITY STUDY: CPT | Performed by: SURGERY

## 2020-01-27 PROCEDURE — 99222 1ST HOSP IP/OBS MODERATE 55: CPT | Performed by: PHYSICIAN ASSISTANT

## 2020-01-27 PROCEDURE — 99232 SBSQ HOSP IP/OBS MODERATE 35: CPT | Performed by: INTERNAL MEDICINE

## 2020-01-27 RX ORDER — NYSTATIN 100000 [USP'U]/G
POWDER TOPICAL 2 TIMES DAILY
Status: DISCONTINUED | OUTPATIENT
Start: 2020-01-27 | End: 2020-01-28 | Stop reason: HOSPADM

## 2020-01-27 RX ORDER — CEPHALEXIN 500 MG/1
500 CAPSULE ORAL EVERY 12 HOURS SCHEDULED
Status: DISCONTINUED | OUTPATIENT
Start: 2020-01-27 | End: 2020-01-28 | Stop reason: HOSPADM

## 2020-01-27 RX ADMIN — LISINOPRIL 20 MG: 20 TABLET ORAL at 08:42

## 2020-01-27 RX ADMIN — ACETAMINOPHEN 975 MG: 325 TABLET, FILM COATED ORAL at 05:13

## 2020-01-27 RX ADMIN — HEPARIN SODIUM 5000 UNITS: 5000 INJECTION INTRAVENOUS; SUBCUTANEOUS at 14:22

## 2020-01-27 RX ADMIN — CEPHALEXIN 500 MG: 500 CAPSULE ORAL at 14:22

## 2020-01-27 RX ADMIN — HEPARIN SODIUM 5000 UNITS: 5000 INJECTION INTRAVENOUS; SUBCUTANEOUS at 21:10

## 2020-01-27 RX ADMIN — ATORVASTATIN CALCIUM 10 MG: 10 TABLET, FILM COATED ORAL at 08:42

## 2020-01-27 RX ADMIN — PANTOPRAZOLE SODIUM 40 MG: 40 TABLET, DELAYED RELEASE ORAL at 05:16

## 2020-01-27 RX ADMIN — CEPHALEXIN 500 MG: 500 CAPSULE ORAL at 21:10

## 2020-01-27 RX ADMIN — ACETAMINOPHEN 975 MG: 325 TABLET, FILM COATED ORAL at 21:10

## 2020-01-27 RX ADMIN — HEPARIN SODIUM 5000 UNITS: 5000 INJECTION INTRAVENOUS; SUBCUTANEOUS at 05:13

## 2020-01-27 RX ADMIN — CEFEPIME HYDROCHLORIDE 1000 MG: 1 INJECTION, POWDER, FOR SOLUTION INTRAMUSCULAR; INTRAVENOUS at 03:34

## 2020-01-27 RX ADMIN — NYSTATIN: 100000 POWDER TOPICAL at 08:43

## 2020-01-27 RX ADMIN — ACETAMINOPHEN 975 MG: 325 TABLET, FILM COATED ORAL at 14:22

## 2020-01-27 RX ADMIN — NYSTATIN: 100000 POWDER TOPICAL at 17:59

## 2020-01-27 NOTE — CONSULTS
Consultation - Infectious Disease   Adriana Cope 81 y o  female MRN: [id number]  Unit/Bed#: -01 Encounter: [id number]      IMPRESSION & RECOMMENDATIONS:   Impression/Recommendations:  1  Left wrist swelling and pain  No evidence of cellulitis on today's exam   Patient with elevated Uric Acid, Inflammatory marker and leukocytosis on admission  Also noted to have fever by EMS  Xrays unrevealing  Rec:   · Suggest to discontinue IV antibiotics  · Start keflex po to complete a total of 7 days  · Await orthopedic evaluation  T/c aspiration to evaluate for crystals  · F/u blood cultures  · Await lyme, RA, JIM  2   Hypertension      3  GERD        Antibiotics:  Vanco/Cefepime #1    Thank you for this consultation  We will follow along with you  HISTORY OF PRESENT ILLNESS:  Reason for Consult:  Cellulitis left wrist    HPI: Adriana Cope is a 81 y o  female presented to the emergency department on 01/26/2020 with complaints of left shoulder pains  Patient reports that she awoke with left shoulder pains  She also notes left arm swelling and left wrist pains  Patient denies any recent injuries  She denies any similar episodes in the past   EMS was called and brought patient to the ED  They noted she had fever of 101  She denies any other complaints  Since admission the patient has remained afebrile  She had initial leukocytosis of 14 54 which is now resolving  Additionally her uric acid level was 7 2  Her inflammatory markers were elevated including CRP was 72 8 and ESR 94  JIM, rheumatoid factor and Lyme studies are all pending  Blood cultures are currently in progress  Venous duplex was negative for thrombosis  X-ray of the left leg showed soft tissue swelling  Left shoulder xray was negative    Emergency department notes reflect that patient has both redness warmth of the left breast Patient was given vancomycin and cefepime in the emergency department and admitted on the same     REVIEW OF SYSTEMS:  Constitutional:  Denies fevers and chills  HEENT:  Denies headache  Cardiovascular:  Denies chest pain  Pulmonary:  Denies shortness of breath or cough  GI:  Denies nausea vomiting diarrhea   denies dysuria  Back:  Denies back pains  Skin:  Denies rash  Extremities:  Reports left upper extremity swelling  Musculoskeletal:  Patient does report left wrist pain  A complete system-based review of systems is otherwise negative  PAST MEDICAL HISTORY:  Past Medical History:   Diagnosis Date    CHF (congestive heart failure) (HCC)     Hyperlipidemia     Hypertension     Knee pain     right     Past Surgical History:   Procedure Laterality Date    BACK SURGERY      KNEE SURGERY         FAMILY HISTORY:  Non-contributory    SOCIAL HISTORY:  Social History     Substance and Sexual Activity   Alcohol Use Never    Frequency: Never     Social History     Substance and Sexual Activity   Drug Use Never     Social History     Tobacco Use   Smoking Status Never Smoker   Smokeless Tobacco Never Used       ALLERGIES:  No Known Allergies    MEDICATIONS:  All current active medications have been reviewed      PHYSICAL EXAM:  Vitals:  Temp:  [98 3 °F (36 8 °C)-99 7 °F (37 6 °C)] 98 5 °F (36 9 °C)  HR:  [58-79] 58  Resp:  [16-20] 16  BP: ()/(49-74) 113/51  SpO2:  [96 %-100 %] 96 %  Temp (24hrs), Av 8 °F (37 1 °C), Min:98 3 °F (36 8 °C), Max:99 7 °F (37 6 °C)  Current: Temperature: 98 5 °F (36 9 °C)     Physical Exam:  General:  Well-nourished, well-developed, in no acute distress  Eyes:  Conjunctive clear with no hemorrhages or effusions  Oropharynx:  No ulcers, no lesions  Neck:  Supple, no lymphadenopathy  Lungs:  Clear to auscultation bilaterally, no accessory muscle use  Cardiac:  Regular rate and rhythm, no murmurs  Abdomen:  Soft, non-tender, non-distended  :  No Mccarthy  Back:  Nontender to palpation  Extremities:  Left upper extremity edema noted on today's exam   Patient is able to move the left shoulder without pain/difficulty  She is able to flex and extend her left elbow, but this elicits pain around the biceps area  She has decreased range of motion the left wrist due to swelling and pain  There is no erythema, induration noted on today's exam   Patient is able to move all of her digits of the left hand and make a fist   Skin:  No rashes, no ulcers  Neurological:  Moves all four extremities spontaneously, sensation grossly intact    LABS, IMAGING, & OTHER STUDIES:  Lab Results:  I have personally reviewed pertinent labs  Results from last 7 days   Lab Units 01/27/20  0507 01/26/20  1436   POTASSIUM mmol/L 3 6 3 5   CHLORIDE mmol/L 109* 101   CO2 mmol/L 24 27   BUN mg/dL 23 27*   CREATININE mg/dL 1 11 1 14   EGFR ml/min/1 73sq m 46 45   CALCIUM mg/dL 8 8 9 4   AST U/L  --  20   ALT U/L  --  21   ALK PHOS U/L  --  79     Results from last 7 days   Lab Units 01/27/20  0507 01/26/20  1436   WBC Thousand/uL 10 30* 14 54*   HEMOGLOBIN g/dL 10 7* 12 7   PLATELETS Thousands/uL 192 273     Results from last 7 days   Lab Units 01/26/20  1521 01/26/20  1436   BLOOD CULTURE  Received in Microbiology Lab  Culture in Progress  Received in Microbiology Lab  Culture in Progress  Imaging Studies:   I have personally reviewed pertinent imaging study reports and images in PACS  EKG, Pathology, and Other Studies:   I have personally reviewed pertinent reports

## 2020-01-27 NOTE — PROGRESS NOTES
Vancomycin Assessment    Niurka Bruner is a 81 y o  female who is currently receiving vancomycin 1000mg IV q12h for skin-soft tissue infection, other bone/joint infection   Relevant clinical data and objective history reviewed:  Creatinine   Date Value Ref Range Status   01/26/2020 1 14 0 60 - 1 30 mg/dL Final     Comment:     Standardized to IDMS reference method   04/22/2019 1 15 0 60 - 1 30 mg/dL Final     Comment:     Standardized to IDMS reference method   03/18/2019 0 89 0 60 - 1 30 mg/dL Final     Comment:     Standardized to IDMS reference method     /57 (BP Location: Right arm)   Pulse 71   Temp 98 3 °F (36 8 °C) (Oral)   Resp 18   Ht 5' 1" (1 549 m)   Wt 73 7 kg (162 lb 7 7 oz)   SpO2 98%   BMI 30 70 kg/m²   I/O last 3 completed shifts: In: 1250 [IV Piggyback:1250]  Out: -   Lab Results   Component Value Date/Time    BUN 27 (H) 01/26/2020 02:36 PM    WBC 14 54 (H) 01/26/2020 02:36 PM    HGB 12 7 01/26/2020 02:36 PM    HCT 40 2 01/26/2020 02:36 PM     (H) 01/26/2020 02:36 PM     01/26/2020 02:36 PM     Temp Readings from Last 3 Encounters:   01/26/20 98 3 °F (36 8 °C) (Oral)   09/30/19 98 °F (36 7 °C) (Oral)   04/22/19 97 7 °F (36 5 °C) (Oral)     Vancomycin Days of Therapy: 1    Assessment/Plan  The patient is currently on vancomycin utilizing scheduled dosing based on adjusted body weight (due to obesity)  Baseline risks associated with therapy include: advanced age  The patient is currently receiving 1000mg IV q12h and after clinical evaluation will be changed to 1250mg IV q24h  Pharmacy will also follow closely for s/sx of nephrotoxicity, infusion reactions and appropriateness of therapy  BMP and CBC will be ordered per protocol  Plan for trough as patient approaches steady state, prior to the 4th  dose at approximately 1530 on 1/30  Due to infection severity, will target a trough of 15-20 (appropriate for most indications)     Pharmacy will continue to follow the patients culture results and clinical progress daily      Anna Oh, Pharmacist

## 2020-01-27 NOTE — UTILIZATION REVIEW
Notification of Inpatient Admission/Inpatient Authorization Request   This is a Notification of Inpatient Admission for [address]  Be advised that this patient was admitted to our facility under Inpatient Status  Contact Katharina Ferrer at [phone] for additional admission information  [facility] DEPT DEDICATED Adan Acuna [phone]  Patient Name:   Alyson Hodges   YOB: 1935       [address]   [address]:   [address]   Tax ID: [id number]  NPI: [id number] Attending Provider/NPI: Jenny Dietz, [address] [[id number]]   Place of Service Code: 24     Place of Service Name:  [address] [place] [address]   Start Date: 1/26/20 1633     Discharge Date & Time: No discharge date for patient encounter  Type of Admission: Inpatient Status Discharge Disposition   (if discharged): Home/Self Care   Patient Diagnoses: Arm pain [M79 603]  Leukocytosis [D72 829]  Left shoulder pain [M25 512]  Pain and swelling of left wrist [M25 532, M25 432]  Cellulitis of left wrist [R86 514]     Orders: Admission Orders (From admission, onward)     Ordered        01/26/20 1633  Inpatient Admission  Once                    Assigned Utilization Review Contact: Katharina Ferrer  Utilization   Network Utilization Review Department  Phone: [phone]; Fax [phone]  Email: [email or web address]   ATTENTION PAYERS: Please call the assigned Utilization  directly with any questions or concerns ALL voicemails in the department are confidential  Send all requests for admission clinical reviews, approved or denied determinations and any other requests to dedicated fax number belonging to the campus where the patient is receiving treatment

## 2020-01-27 NOTE — UTILIZATION REVIEW
Initial Clinical Review    Admission: Date/Time/Statement: Inpatient Admission Orders (From admission, onward)     Ordered        01/26/20 1633  Inpatient Admission  Once                   Orders Placed This Encounter   Procedures    Inpatient Admission     Standing Status:   Standing     Number of Occurrences:   1     Order Specific Question:   Admitting Physician     Answer:   Mehdi Falcon [[id number]]     Order Specific Question:   Level of Care     Answer:   Med Surg [16]     Order Specific Question:   Estimated length of stay     Answer:   More than 2 Midnights     Order Specific Question:   Certification     Answer:   I certify that inpatient services are medically necessary for this patient for a duration of greater than two midnights  See H&P and MD Progress Notes for additional information about the patient's course of treatment  ED Arrival Information     Expected Arrival Acuity Means of Arrival Escorted By Service Admission Type    - 1/26/2020 13:13 Urgent Ambulance [address] Urgent    Arrival Complaint    -        Chief Complaint   Patient presents with    Arm Pain     Patient c/o left shoulder and arm pain, denies injury  Area of redness and swelling noted to left hand/wrist   Patient denies chest pain or SOB  Assessment/Plan: 82 yo female to ED from home via EMS w/ L shoulder pain   difficulty lifting her shoulder and arm due to pain  Noted to have a fever 101  admitted IP status w/ cellulitis L wrist   Cannot r/o septic arthritis plan to consult ortho , check uric acid level , cont vanco and cefepime , ID consult , monitor cbc and temps  HTN cont lisinopril  PE : tenderness  Area of erythema over the left wrist, tender and warm to touch    Slightly decreased wrist movement     ED Triage Vitals [01/26/20 1324]   Temperature Pulse Respirations Blood Pressure SpO2   99 7 °F (37 6 °C) 62 20 151/65 100 %      Temp Source Heart Rate Source Patient Position - Orthostatic VS BP Location FiO2 (%)   Oral Monitor Lying Right arm --      Pain Score       8        Wt Readings from Last 1 Encounters:   01/26/20 73 7 kg (162 lb 7 7 oz)     Additional Vital Signs:   01/27/20 0700  98 5 °F (36 9 °C)  58  16  113/51  74  96 %  None (Room air)  Sitting   01/26/20 2255        110/52        Lying   01/26/20 2246  98 8 °F (37 1 °C)  66  18  98/49Abnormal   70  96 %  None (Room air)  Lying   01/26/20 2000  98 3 °F (36 8 °C)  71  18  119/57  82  98 %  None (Room air)  Lying   01/26/20 1800    68  16  144/67    98 %  None (Room air)  Lying   01/26/20 1700    73  18  153/69    98 %  None (Room air)  Lying   01/26/20 1609    71  18  148/68    99 %  None (Room air)  Lying   01/26/20 1530    75  20  171/74Abnormal     97 %  None (Room air)  Lying   01/26/20 1524    73  18  169/70    98 %  None (Room air)  Lying   01/26/20 1442    68  18  166/71    98 %  None (Room air)  Lying   01/26/20 1400    79  20  157/71    98 %  None (Room air)  Lying   01/26/20 1332              None (Room air)         Pertinent Labs/Diagnostic Test Results:   1/26 EKG - NSR w/ PVCs   1/26 venous duplex - RIGHT UPPER LIMB LIMITED:  Evaluation shows no evidence of thrombus in the internal jugular vein,  subclavian vein, and the brachiocephalic vein  LEFT UPPER LIMB:  No evidence of acute or chronic deep vein thrombosis  No evidence of superficial thrombophlebitis noted  Doppler evaluation shows a normal response to augmentation maneuvers  1/26 CXR No acute cardiopulmonary disease  1/26 L wrist xray Soft tissue swelling at the wrist   No acute osseous abnormality      1/26 L shoulder xray No acute osseous abnormality    Results from last 7 days   Lab Units 01/27/20  0507 01/26/20  1436   WBC Thousand/uL 10 30* 14 54*   HEMOGLOBIN g/dL 10 7* 12 7   HEMATOCRIT % 33 4* 40 2   PLATELETS Thousands/uL 192 273   NEUTROS ABS Thousands/µL  --  12 35*     Results from last 7 days   Lab Units 01/27/20  0507 01/26/20  1436   SODIUM mmol/L 142 140   POTASSIUM mmol/L 3 6 3 5   CHLORIDE mmol/L 109* 101   CO2 mmol/L 24 27   ANION GAP mmol/L 9 12   BUN mg/dL 23 27*   CREATININE mg/dL 1 11 1 14   EGFR ml/min/1 73sq m 46 45   CALCIUM mg/dL 8 8 9 4   MAGNESIUM mg/dL  --  1 6     Results from last 7 days   Lab Units 01/26/20  1436   AST U/L 20   ALT U/L 21   ALK PHOS U/L 79   TOTAL PROTEIN g/dL 8 3*   ALBUMIN g/dL 3 4*   TOTAL BILIRUBIN mg/dL 0 60   BILIRUBIN DIRECT mg/dL 0 15     Results from last 7 days   Lab Units 01/27/20  0507 01/26/20  1436   GLUCOSE RANDOM mg/dL 102 153*     Results from last 7 days   Lab Units 01/26/20  1436   TROPONIN I ng/mL <0 02     Results from last 7 days   Lab Units 01/26/20  1436   LACTIC ACID mmol/L 1 8     Results from last 7 days   Lab Units 01/26/20  1436   CRP mg/L 72 8*   SED RATE mm/hour 94*     Results from last 7 days   Lab Units 01/26/20  1427   CLARITY UA  Clear   COLOR UA  Yellow   SPEC GRAV UA  1 010   PH UA  5 5   GLUCOSE UA mg/dl 100 (1/10%)*   KETONES UA mg/dl Negative   BLOOD UA  Small*   PROTEIN UA mg/dl Trace*   NITRITE UA  Negative   BILIRUBIN UA  Negative   UROBILINOGEN UA E U /dl 0 2   LEUKOCYTES UA  Negative   WBC UA /hpf None Seen   RBC UA /hpf 4-10*   BACTERIA UA /hpf Occasional   EPITHELIAL CELLS WET PREP /hpf Occasional     Results from last 7 days   Lab Units 01/26/20  1521 01/26/20  1436   BLOOD CULTURE  Received in Microbiology Lab  Culture in Progress  Received in Microbiology Lab  Culture in Progress       ED Treatment:   Medication Administration from 01/26/2020 1313 to 01/26/2020 1928       Date/Time Order Dose Route Action     01/26/2020 1441 sodium chloride 0 9 % bolus 1,000 mL 1,000 mL Intravenous New Bag     01/26/2020 1444 morphine injection 2 mg 2 mg Intravenous Given     01/26/2020 1523 cefepime (MAXIPIME) 2,000 mg in dextrose 5 % 50 mL IVPB 2,000 mg Intravenous New Bag     01/26/2020 1606 vancomycin (VANCOCIN) IVPB (premix) 1,000 mg 1,000 mg Intravenous New Bag        Past Medical History:   Diagnosis Date    CHF (congestive heart failure) (HCC)     Hyperlipidemia     Hypertension     Knee pain     right     Present on Admission:  **None**      Admitting Diagnosis: Arm pain [M79 603]  Leukocytosis [D72 829]  Left shoulder pain [M25 512]  Pain and swelling of left wrist [M25 532, M25 432]  Cellulitis of left wrist [L03 114]  Age/Sex: 81 y o  female  Admission Orders:  Scheduled Medications:    Medications:  acetaminophen 975 mg Oral Q8H [facility]   atorvastatin 10 mg Oral Daily   cefepime 1,000 mg Intravenous Q12H   heparin (porcine) 5,000 Units Subcutaneous Q8H [facility]   lisinopril 20 mg Oral Daily   nystatin  Topical BID   pantoprazole 40 mg Oral Early Morning   vancomycin 20 mg/kg (Adjusted) Intravenous Q24H     Continuous IV Infusions:     PRN Meds:    ondansetron 4 mg Intravenous Q6H PRN     Reg diet   Up w/ assist   Tele   IP CONSULT TO ORTHOPEDIC SURGERY  IP CONSULT TO INFECTIOUS DISEASES  IP CONSULT TO PHARMACY    Network Utilization Review Department  [email or web address]  ATTENTION: Please call with any questions or concerns to [phone] and carefully listen to the prompts so that you are directed to the right person  All voicemails are confidential   Brenna Andino all requests for admission clinical reviews, approved or denied determinations and any other requests to dedicated fax number below belonging to the campus where the patient is receiving treatment   List of dedicated fax numbers for the Facilities:  [address] [place] [address] DENIALS (Administrative/Medical Necessity) [phone]   [address] [place] [address] (Maternity/NICU/Pediatrics) [phone]   Freddy Newton [phone]   [facility] [phone]   [facility] [phone]   [address] [facility] [phone] [address], [facility]  [phone] [address] [address] [facility] [phone]

## 2020-01-27 NOTE — CONSULTS
Orthopedics   Chitra Garza 81 y o  female MRN: [id number]  Unit/Bed#: -01      Chief Complaint:   left wrist pain and erythema    HPI:   81 y  o female complaining of left wrist erythema and pain  Patient has a past medical history for hypertension, hyperlipidemia, congestive heart failure who presented with left wrist pain and erythema  She denies any injuries or trauma to the area  She states that she woke up and she started to note some swelling and pain in her hand and shoulder  She did have a fever of 101 but denied any or chills  She was started on some antibiotics  She states today she notes a significant improvement in her symptoms  She notes that the redness in her left wrist has gone down  She is able to move her wrist without any discomfort at this time  She denies any numbness or tingling  Review Of Systems:   · Skin: Normal  · Neuro: See HPI  · Musculoskeletal: See HPI  · 14 point review of systems negative except as stated above     Past Medical History:   Past Medical History:   Diagnosis Date    CHF (congestive heart failure) ([facility] [address] )     Hyperlipidemia     Hypertension     Knee pain     right       Past Surgical History:   Past Surgical History:   Procedure Laterality Date    BACK SURGERY      KNEE SURGERY         Family History:  Family history reviewed and non-contributory  History reviewed  No pertinent family history  Social History:  Social History     Socioeconomic History    Marital status:       Spouse name: None    Number of children: None    Years of education: None    Highest education level: None   Occupational History    None   Social Needs    Financial resource strain: None    Food insecurity:     Worry: None     Inability: None    Transportation needs:     Medical: None     Non-medical: None   Tobacco Use    Smoking status: Never Smoker    Smokeless tobacco: Never Used   Substance and Sexual Activity    Alcohol use: Never     Frequency: Never  Drug use: Never    Sexual activity: None   Lifestyle    Physical activity:     Days per week: None     Minutes per session: None    Stress: None   Relationships    Social connections:     Talks on phone: None     Gets together: None     Attends [employer] service: None     Active member of club or organization: None     Attends meetings of clubs or organizations: None     Relationship status: None    Intimate partner violence:     Fear of current or ex partner: None     Emotionally abused: None     Physically abused: None     Forced sexual activity: None   Other Topics Concern    None   Social History Narrative    ** Merged History Encounter **            Allergies:   No Known Allergies        Labs:  0   Lab Value Date/Time    HCT 33 4 (L) 01/27/2020 0507    HCT 40 2 01/26/2020 1436    HCT 38 4 04/22/2019 0244    HGB 10 7 (L) 01/27/2020 0507    HGB 12 7 01/26/2020 1436    HGB 12 5 04/22/2019 0244    INR 1 11 05/16/2018 1844    WBC 10 30 (H) 01/27/2020 0507    WBC 14 54 (H) 01/26/2020 1436    WBC 8 83 04/22/2019 0244    ESR 94 (H) 01/26/2020 1436    CRP 72 8 (H) 01/26/2020 1436       Meds:    Current Facility-Administered Medications:     acetaminophen (TYLENOL) tablet 975 mg, 975 mg, Oral, Q8H [facility], Rodo Willson MD, 975 mg at 01/27/20 0513    atorvastatin (LIPITOR) tablet 10 mg, 10 mg, Oral, Daily, Erika Rodriguez MD, 10 mg at 01/27/20 0842    cephalexin (KEFLEX) capsule 500 mg, 500 mg, Oral, Q12H [facility], Valencia Gallagher PA-C    heparin (porcine) subcutaneous injection 5,000 Units, 5,000 Units, Subcutaneous, Q8H [facility], Erika Rodriguez MD, 5,000 Units at 01/27/20 0513    lisinopril (ZESTRIL) tablet 20 mg, 20 mg, Oral, Daily, Erika Rodriguez MD, 20 mg at 01/27/20 8791    nystatin (MYCOSTATIN) powder, , Topical, BID, Katherine Silverio MD    ondansetron [facility]) injection 4 mg, 4 mg, Intravenous, Q6H PRN, Erika Rodriguez MD    pantoprazole (PROTONIX) EC tablet 40 mg, 40 mg, Oral, Early Morning, Gil Hensley MD, 40 mg at 01/27/20 0516    Blood Culture:   Lab Results   Component Value Date    BLOODCX Received in Microbiology Lab  Culture in Progress  01/26/2020       Wound Culture:   No results found for: WOUNDCULT    Ins and Outs:  I/O last 24 hours: In: 1250 [IV Piggyback:1250]  Out: -      Uric acid - 7 2  C reactive protein - 72 8  Sedimentation rate - 94  Blood cultures still pending        Physical Exam:   /51 (BP Location: Left arm)   Pulse 58   Temp 98 5 °F (36 9 °C) (Oral)   Resp 16   Ht 5' 1" (1 549 m)   Wt 73 7 kg (162 lb 7 7 oz)   SpO2 96%   BMI 30 70 kg/m²   Gen: Alert and oriented to person, place, time  HEENT: EOMI, eyes clear, moist mucus membranes, hearing intact  Respiratory: Bilateral chest rise  No audible wheezing found  Cardiovascular: Regular Rate and Rhythm  Abdomen: soft nontender/nondistended  · Musculoskeletal: left Upper Extremity  · Skin:  Minimal erythema noted over the left wrist with mild soft tissue swelling  · She has no pain with any range of motion at the wrist for flexion, extension, radial and ulnar deviation  · Sensation intact Radial, Ulna and Median  · 5/5 motor to Radial, Ulna and Median    Radiology:   I personally reviewed the films  X-rays of left wrist show no acute fractures or dislocations appreciated  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    _*_*_*_*_*_*_*_*_*_*_*_*_*_*_*_*_*_*_*_*_*_*_*_*_*_*_*_*_*_*_*_*_*_*_*_*_*_*_*_*_*    Assessment:  81 y  o female with  left wrist cellulitis resolving  Plan:   · Cont  abx per primary team  · Weightbearing as tolerated left upper extremity  · Analgesics for pain as per primary team  · Body mass index is 30 7 kg/m²  mildly obese  Recommend nutrition and physical activity  · Dispo: [id number] [address]namara for discharge from ortho perspective   · It was discussed with [facility] that her cellulitis is resolving  She may have had an acute gout flare as well as she had an elevated uric acid level    There is no significant swelling over the wrist joint to be able to aspirate to send for crystals, culture and Gram stain  We will treat her empirically for left wrist cellulitis  She can take anti-inflammatories as needed for the pain which may also help with an acute gout flare  She was advised that she can follow-up with Dr Dnayelle Dowling as an outpatient if there were to be any concern  No further orthopedic intervention is needed at this time, if there is any questions, please reach out  Karna Plumber Darryle Sanger, PA-C

## 2020-01-27 NOTE — ASSESSMENT & PLAN NOTE
X-ray of the wrist, preliminary report showed no evidence of acute nauseous abnormality  Upon presentation she appeared to have left wrist erythema redness and swelling concerning for acute cellulitis however she showed significant improvement in 24 hours with IV antibiotics  Discussed with infectious disease, can change to Keflex to complete the antibiotic course  Her she had elevated sed rate, CRP and uric acid level    Possible gout as alternative diagnosis however her symptoms improved with IV antibiotics  Continue serial exams  If no improvement consider prednisone short course  Monitor clinically

## 2020-01-27 NOTE — PROGRESS NOTES
Vancomycin IV Pharmacy-to-Dose Consultation    Kimberly Emmanuel is a 81 y o  female who is currently receiving Vancomycin IV with management by the Pharmacy Consult service  Assessment/Plan:  The patient was reviewed  Renal function is stable and no signs or symptoms of nephrotoxicity and/or infusion reactions were documented in the chart  Based on todays assessment, continue current vancomycin (day # 2) dosing of 1250 mg IV q24h, with a plan for trough to be drawn at 1530 on 1-  We will continue to follow the patients culture results and clinical progress daily      Yenifer Davis, Pharmacist

## 2020-01-27 NOTE — PLAN OF CARE
Problem: INFECTION - ADULT  Goal: Absence or prevention of progression during hospitalization  Description  INTERVENTIONS:  - Assess and monitor for signs and symptoms of infection  - Monitor lab/diagnostic results  - Monitor all insertion sites, i e  indwelling lines, tubes, and drains  - Monitor endotracheal if appropriate and nasal secretions for changes in amount and color  - [place] appropriate cooling/warming therapies per order  - Administer medications as ordered  - Instruct and encourage patient and family to use good hand hygiene technique  - Identify and instruct in appropriate isolation precautions for identified infection/condition  Outcome: Progressing  Goal: Absence of fever/infection during neutropenic period  Description  INTERVENTIONS:  - Monitor WBC    Outcome: Progressing     Problem: PAIN - ADULT  Goal: Verbalizes/displays adequate comfort level or baseline comfort level  Description  Interventions:  - Encourage patient to monitor pain and request assistance  - Assess pain using appropriate pain scale  - Administer analgesics based on type and severity of pain and evaluate response  - Implement non-pharmacological measures as appropriate and evaluate response  - Consider cultural and social influences on pain and pain management  - Notify physician/advanced practitioner if interventions unsuccessful or patient reports new pain  Outcome: Progressing     Problem: MUSCULOSKELETAL - ADULT  Goal: Maintain or return mobility to safest level of function  Description  INTERVENTIONS:  - Assess patient's ability to carry out ADLs; assess patient's baseline for ADL function and identify physical deficits which impact ability to perform ADLs (bathing, care of mouth/teeth, toileting, grooming, dressing, etc )  - Assess/evaluate cause of self-care deficits   - Assess range of motion  - Assess patient's mobility  - Assess patient's need for assistive devices and provide as appropriate  - Encourage maximum independence but intervene and supervise when necessary  - Involve family in performance of ADLs  - Assess for home care needs following discharge   - Consider OT consult to assist with ADL evaluation and planning for discharge  - Provide patient education as appropriate  Outcome: Progressing  Goal: Maintain proper alignment of affected body part  Description  INTERVENTIONS:  - Support, maintain and protect limb and body alignment  - Provide patient/ family with appropriate education  Outcome: Progressing     Problem: SKIN/TISSUE INTEGRITY - ADULT  Goal: Skin integrity remains intact  Description  INTERVENTIONS  - Identify patients at risk for skin breakdown  - Assess and monitor skin integrity  - Assess and monitor nutrition and hydration status  - Monitor labs (i e  albumin)  - Assess for incontinence   - Turn and reposition patient  - Assist with mobility/ambulation  - Relieve pressure over bony prominences  - Avoid friction and shearing  - Provide appropriate hygiene as needed including keeping skin clean and dry  - Evaluate need for skin moisturizer/barrier cream  - Collaborate with interdisciplinary team (i e  Nutrition, Rehabilitation, etc )   - Patient/family teaching  Outcome: Progressing     Problem: Knowledge Deficit  Goal: Patient/family/caregiver demonstrates understanding of disease process, treatment plan, medications, and discharge instructions  Description  Complete learning assessment and assess knowledge base    Interventions:  - Provide teaching at level of understanding  - Provide teaching via preferred learning methods  Outcome: Progressing     Problem: Prexisting or High Potential for Compromised Skin Integrity  Goal: Skin integrity is maintained or improved  Description  INTERVENTIONS:  - Identify patients at risk for skin breakdown  - Assess and monitor skin integrity  - Assess and monitor nutrition and hydration status  - Monitor labs   - Assess for incontinence   - Turn and reposition patient  - Assist with mobility/ambulation  - Relieve pressure over bony prominences  - Avoid friction and shearing  - Provide appropriate hygiene as needed including keeping skin clean and dry  - Evaluate need for skin moisturizer/barrier cream  - Collaborate with interdisciplinary team   - Patient/family teaching  - Consider wound care consult   Outcome: Progressing     Problem: DISCHARGE PLANNING  Goal: Discharge to home or other facility with appropriate resources  Description  INTERVENTIONS:  - Identify barriers to discharge w/patient and caregiver  - Arrange for needed discharge resources and transportation as appropriate  - Identify discharge learning needs (meds, wound care, etc )  - Arrange for interpretive services to assist at discharge as needed  - Refer to Case Management Department for coordinating discharge planning if the patient needs post-hospital services based on physician/advanced practitioner order or complex needs related to functional status, cognitive ability, or social support system  Outcome: Progressing

## 2020-01-27 NOTE — PLAN OF CARE
Problem: Prexisting or High Potential for Compromised Skin Integrity  Goal: Skin integrity is maintained or improved  Description  INTERVENTIONS:  - Identify patients at risk for skin breakdown  - Assess and monitor skin integrity  - Assess and monitor nutrition and hydration status  - Monitor labs   - Assess for incontinence   - Turn and reposition patient  - Assist with mobility/ambulation  - Relieve pressure over bony prominences  - Avoid friction and shearing  - Provide appropriate hygiene as needed including keeping skin clean and dry  - Evaluate need for skin moisturizer/barrier cream  - Collaborate with interdisciplinary team   - Patient/family teaching  - Consider wound care consult   Outcome: Progressing     Problem: PAIN - ADULT  Goal: Verbalizes/displays adequate comfort level or baseline comfort level  Description  Interventions:  - Encourage patient to monitor pain and request assistance  - Assess pain using appropriate pain scale  - Administer analgesics based on type and severity of pain and evaluate response  - Implement non-pharmacological measures as appropriate and evaluate response  - Consider cultural and social influences on pain and pain management  - Notify physician/advanced practitioner if interventions unsuccessful or patient reports new pain  Outcome: Progressing     Problem: INFECTION - ADULT  Goal: Absence or prevention of progression during hospitalization  Description  INTERVENTIONS:  - Assess and monitor for signs and symptoms of infection  - Monitor lab/diagnostic results  - Monitor all insertion sites, i e  indwelling lines, tubes, and drains  - Monitor endotracheal if appropriate and nasal secretions for changes in amount and color  - [place] appropriate cooling/warming therapies per order  - Administer medications as ordered  - Instruct and encourage patient and family to use good hand hygiene technique  - Identify and instruct in appropriate isolation precautions for identified infection/condition  Outcome: Progressing  Goal: Absence of fever/infection during neutropenic period  Description  INTERVENTIONS:  - Monitor WBC    Outcome: Progressing     Problem: SAFETY ADULT  Goal: Patient will remain free of falls  Description  INTERVENTIONS:  - Assess patient frequently for physical needs  -  Identify cognitive and physical deficits and behaviors that affect risk of falls    -  [place] fall precautions as indicated by assessment   - Educate patient/family on patient safety including physical limitations  - Instruct patient to call for assistance with activity based on assessment  - Modify environment to reduce risk of injury  - Consider OT/PT consult to assist with strengthening/mobility  Outcome: Progressing  Goal: Maintain or return to baseline ADL function  Description  INTERVENTIONS:  -  Assess patient's ability to carry out ADLs; assess patient's baseline for ADL function and identify physical deficits which impact ability to perform ADLs (bathing, care of mouth/teeth, toileting, grooming, dressing, etc )  - Assess/evaluate cause of self-care deficits   - Assess range of motion  - Assess patient's mobility; develop plan if impaired  - Assess patient's need for assistive devices and provide as appropriate  - Encourage maximum independence but intervene and supervise when necessary  - Involve family in performance of ADLs  - Assess for home care needs following discharge   - Consider OT consult to assist with ADL evaluation and planning for discharge  - Provide patient education as appropriate  Outcome: Progressing  Goal: Maintain or return mobility status to optimal level  Description  INTERVENTIONS:  - Assess patient's baseline mobility status (ambulation, transfers, stairs, etc )    - Identify cognitive and physical deficits and behaviors that affect mobility  - Identify mobility aids required to assist with transfers and/or ambulation (gait belt, sit-to-stand, lift, walker, cane, etc )  - [place] fall precautions as indicated by assessment  - Record patient progress and toleration of activity level on Mobility SBAR; progress patient to next Phase/Stage  - Instruct patient to call for assistance with activity based on assessment  - Consider rehabilitation consult to assist with strengthening/weightbearing, etc   Outcome: Progressing     Problem: DISCHARGE PLANNING  Goal: Discharge to home or other facility with appropriate resources  Description  INTERVENTIONS:  - Identify barriers to discharge w/patient and caregiver  - Arrange for needed discharge resources and transportation as appropriate  - Identify discharge learning needs (meds, wound care, etc )  - Arrange for interpretive services to assist at discharge as needed  - Refer to Case Management Department for coordinating discharge planning if the patient needs post-hospital services based on physician/advanced practitioner order or complex needs related to functional status, cognitive ability, or social support system  Outcome: Progressing     Problem: Knowledge Deficit  Goal: Patient/family/caregiver demonstrates understanding of disease process, treatment plan, medications, and discharge instructions  Description  Complete learning assessment and assess knowledge base    Interventions:  - Provide teaching at level of understanding  - Provide teaching via preferred learning methods  Outcome: Progressing     Problem: SKIN/TISSUE INTEGRITY - ADULT  Goal: Skin integrity remains intact  Description  INTERVENTIONS  - Identify patients at risk for skin breakdown  - Assess and monitor skin integrity  - Assess and monitor nutrition and hydration status  - Monitor labs (i e  albumin)  - Assess for incontinence   - Turn and reposition patient  - Assist with mobility/ambulation  - Relieve pressure over bony prominences  - Avoid friction and shearing  - Provide appropriate hygiene as needed including keeping skin clean and dry  - Evaluate need for skin moisturizer/barrier cream  - Collaborate with interdisciplinary team (i e  Nutrition, Rehabilitation, etc )   - Patient/family teaching  Outcome: Progressing     Problem: MUSCULOSKELETAL - ADULT  Goal: Maintain or return mobility to safest level of function  Description  INTERVENTIONS:  - Assess patient's ability to carry out ADLs; assess patient's baseline for ADL function and identify physical deficits which impact ability to perform ADLs (bathing, care of mouth/teeth, toileting, grooming, dressing, etc )  - Assess/evaluate cause of self-care deficits   - Assess range of motion  - Assess patient's mobility  - Assess patient's need for assistive devices and provide as appropriate  - Encourage maximum independence but intervene and supervise when necessary  - Involve family in performance of ADLs  - Assess for home care needs following discharge   - Consider OT consult to assist with ADL evaluation and planning for discharge  - Provide patient education as appropriate  Outcome: Progressing  Goal: Maintain proper alignment of affected body part  Description  INTERVENTIONS:  - Support, maintain and protect limb and body alignment  - Provide patient/ family with appropriate education  Outcome: Progressing

## 2020-01-27 NOTE — PROGRESS NOTES
Progress Note - Arabella Sy 1935, 81 y o  female MRN: [id number]    Unit/Bed#: -01 Encounter: [id number]    Primary Care Provider: Gabi Green MD   Date and time admitted to hospital: 1/26/2020  1:13 PM        * Cellulitis of left wrist  Assessment & Plan  X-ray of the wrist, preliminary report showed no evidence of acute nauseous abnormality  Upon presentation she appeared to have left wrist erythema redness and swelling concerning for acute cellulitis however she showed significant improvement in 24 hours with IV antibiotics  Discussed with infectious disease, can change to Keflex to complete the antibiotic course  Her she had elevated sed rate, CRP and uric acid level  Possible gout as alternative diagnosis however her symptoms improved with IV antibiotics  Continue serial exams  If no improvement consider prednisone short course  Monitor clinically      GERD (gastroesophageal reflux disease)  Assessment & Plan  Continue PPI    Hypertension  Assessment & Plan  Blood pressure appears to be stable  Continue lisinopril      VTE Pharmacologic Prophylaxis:   Pharmacologic: Heparin  Mechanical VTE Prophylaxis in Place: Yes    Patient Centered Rounds: I have performed bedside rounds with nursing staff today  Discussions with Specialists or Other Care Team Provider: Id, ortho    Education and Discussions with Family / Patient: patient    Time Spent for Care: 30 minutes  More than 50% of total time spent on counseling and coordination of care as described above  Current Length of Stay: 1 day(s)    Current Patient Status: Inpatient   Certification Statement: The patient will continue to require additional inpatient hospital stay due to above medical problems    Discharge Plan: likely tomorrow    Code Status: Level 3 - DNAR and DNI      Subjective:   Pt s/e  No acute complaints  Feeling a lot better today  No fevers or chills  Decreased swelling  Decreased erythema    No other complaints  Objective:     Vitals:   Temp (24hrs), Av 5 °F (36 9 °C), Min:98 3 °F (36 8 °C), Max:98 8 °F (37 1 °C)    Temp:  [98 3 °F (36 8 °C)-98 8 °F (37 1 °C)] 98 5 °F (36 9 °C)  HR:  [58-75] 58  Resp:  [16-20] 16  BP: ()/(49-74) 113/51  SpO2:  [96 %-99 %] 96 %  Body mass index is 30 7 kg/m²  Input and Output Summary (last 24 hours): Intake/Output Summary (Last 24 hours) at 2020 1437  Last data filed at 2020 1716  Gross per 24 hour   Intake 1250 ml   Output    Net 1250 ml       Physical Exam:     Physical Exam   Constitutional: She is oriented to person, place, and time  She appears well-developed and well-nourished  HENT:   Head: Normocephalic and atraumatic  Eyes: Pupils are equal, round, and reactive to light  EOM are normal    Neck: Normal range of motion  Neck supple  Cardiovascular: Normal rate and regular rhythm  Pulmonary/Chest: Effort normal and breath sounds normal    Abdominal: Soft  Bowel sounds are normal    Musculoskeletal: Normal range of motion  She exhibits edema  Neurological: She is alert and oriented to person, place, and time  Skin: Skin is warm and dry  There is erythema         Additional Data:     Labs:    Results from last 7 days   Lab Units 20  0507 20  1436   WBC Thousand/uL 10 30* 14 54*   HEMOGLOBIN g/dL 10 7* 12 7   HEMATOCRIT % 33 4* 40 2   PLATELETS Thousands/uL 192 273   NEUTROS PCT %  --  85*   LYMPHS PCT %  --  7*   MONOS PCT %  --  7   EOS PCT %  --  0     Results from last 7 days   Lab Units 20  0507 20  1436   SODIUM mmol/L 142 140   POTASSIUM mmol/L 3 6 3 5   CHLORIDE mmol/L 109* 101   CO2 mmol/L 24 27   BUN mg/dL 23 27*   CREATININE mg/dL 1 11 1 14   ANION GAP mmol/L 9 12   CALCIUM mg/dL 8 8 9 4   ALBUMIN g/dL  --  3 4*   TOTAL BILIRUBIN mg/dL  --  0 60   ALK PHOS U/L  --  79   ALT U/L  --  21   AST U/L  --  20   GLUCOSE RANDOM mg/dL 102 153*                 Results from last 7 days   Lab Units 01/26/20  1436   LACTIC ACID mmol/L 1 8           * I Have Reviewed All Lab Data Listed Above  * Additional Pertinent Lab Tests Reviewed: [facility] 67 Admission Reviewed    Imaging:    Imaging Reports Reviewed Today Include:  X-ray of the left wrist  Imaging Personally Reviewed by Myself Includes:      Recent Cultures (last 7 days):     Results from last 7 days   Lab Units 01/26/20  1521 01/26/20  1436   BLOOD CULTURE  Received in Microbiology Lab  Culture in Progress  Received in Microbiology Lab  Culture in Progress  Last 24 Hours Medication List:     Current Facility-Administered Medications:  acetaminophen 975 mg Oral Q8H Odalys Kearney MD   atorvastatin 10 mg Oral Daily Carolyn Zavaleta MD   cephalexin 500 mg Oral Q12H [address] Sirena Latham PA-C   heparin (porcine) 5,000 Units Subcutaneous Q8H [address] Carolyn Zavaleta MD   lisinopril 20 mg Oral Daily Carolyn Zavaleta MD   nystatin  Topical BID Josafat Hopkins MD   ondansetron 4 mg Intravenous Q6H PRN Carolyn Zavaleta MD   pantoprazole 40 mg Oral Early Morning Carolyn Zavaleta MD        Today, Patient Was Seen By: Carolyn Zavaleta MD    ** Please Note: Dictation voice to text software may have been used in the creation of this document   **

## 2020-01-28 VITALS
BODY MASS INDEX: 30.68 KG/M2 | HEART RATE: 56 BPM | HEIGHT: 61 IN | OXYGEN SATURATION: 95 % | SYSTOLIC BLOOD PRESSURE: 106 MMHG | TEMPERATURE: 98.2 F | WEIGHT: 162.48 LBS | RESPIRATION RATE: 16 BRPM | DIASTOLIC BLOOD PRESSURE: 52 MMHG

## 2020-01-28 LAB
ANION GAP SERPL CALCULATED.3IONS-SCNC: 9 MMOL/L (ref 4–13)
B BURGDOR IGG+IGM SER-ACNC: <0.91 ISR (ref 0–0.9)
BUN SERPL-MCNC: 25 MG/DL (ref 5–25)
CALCIUM SERPL-MCNC: 9 MG/DL (ref 8.3–10.1)
CHLORIDE SERPL-SCNC: 108 MMOL/L (ref 100–108)
CO2 SERPL-SCNC: 26 MMOL/L (ref 21–32)
CREAT SERPL-MCNC: 1.18 MG/DL (ref 0.6–1.3)
ERYTHROCYTE [DISTWIDTH] IN BLOOD BY AUTOMATED COUNT: 12.8 % (ref 11.6–15.1)
GFR SERPL CREATININE-BSD FRML MDRD: 43 ML/MIN/1.73SQ M
GLUCOSE SERPL-MCNC: 105 MG/DL (ref 65–140)
HCT VFR BLD AUTO: 34.3 % (ref 34.8–46.1)
HGB BLD-MCNC: 11 G/DL (ref 11.5–15.4)
MCH RBC QN AUTO: 33 PG (ref 26.8–34.3)
MCHC RBC AUTO-ENTMCNC: 32.1 G/DL (ref 31.4–37.4)
MCV RBC AUTO: 103 FL (ref 82–98)
PLATELET # BLD AUTO: 242 THOUSANDS/UL (ref 149–390)
PMV BLD AUTO: 10.6 FL (ref 8.9–12.7)
POTASSIUM SERPL-SCNC: 4.4 MMOL/L (ref 3.5–5.3)
RBC # BLD AUTO: 3.33 MILLION/UL (ref 3.81–5.12)
SODIUM SERPL-SCNC: 143 MMOL/L (ref 136–145)
WBC # BLD AUTO: 9.65 THOUSAND/UL (ref 4.31–10.16)

## 2020-01-28 PROCEDURE — 99239 HOSP IP/OBS DSCHRG MGMT >30: CPT | Performed by: INTERNAL MEDICINE

## 2020-01-28 PROCEDURE — 99233 SBSQ HOSP IP/OBS HIGH 50: CPT | Performed by: INTERNAL MEDICINE

## 2020-01-28 PROCEDURE — 85027 COMPLETE CBC AUTOMATED: CPT | Performed by: INTERNAL MEDICINE

## 2020-01-28 PROCEDURE — 80048 BASIC METABOLIC PNL TOTAL CA: CPT | Performed by: INTERNAL MEDICINE

## 2020-01-28 RX ORDER — CEPHALEXIN 500 MG/1
500 CAPSULE ORAL EVERY 12 HOURS SCHEDULED
Qty: 10 CAPSULE | Refills: 0 | Status: SHIPPED | OUTPATIENT
Start: 2020-01-28 | End: 2020-02-02

## 2020-01-28 RX ADMIN — NYSTATIN: 100000 POWDER TOPICAL at 08:07

## 2020-01-28 RX ADMIN — ATORVASTATIN CALCIUM 10 MG: 10 TABLET, FILM COATED ORAL at 08:07

## 2020-01-28 RX ADMIN — PANTOPRAZOLE SODIUM 40 MG: 40 TABLET, DELAYED RELEASE ORAL at 05:33

## 2020-01-28 RX ADMIN — CEPHALEXIN 500 MG: 500 CAPSULE ORAL at 08:07

## 2020-01-28 RX ADMIN — ACETAMINOPHEN 975 MG: 325 TABLET, FILM COATED ORAL at 05:33

## 2020-01-28 RX ADMIN — HEPARIN SODIUM 5000 UNITS: 5000 INJECTION INTRAVENOUS; SUBCUTANEOUS at 05:33

## 2020-01-28 NOTE — SOCIAL WORK
LOS 2 0 The pt is not a bundle   The pt is not a 30 day re-admit  The pt resides in an apartment with a friend located in [place] on the 2nd floor of a building with an elevator  The pt uses a walker and is able to complete all of her ADLs' without any assistance  The pt has no Hx of VNA/STR or MH/SA  The pt pharmacy is [facility] and is able to afford her medications  The pt is retired and drives herself to and from [employer]  The pt will need a lyft to return home  CM reviewed discharge planning process including the following: identifying caregivers at home, preference for d/c planning needs, availability of treatment team to discuss questions or concerns patient and/or family may have regarding diagnosis, plan of care, old or new medications and discharge planning  Discharge Checklist reviewed and CM will continue to monitor for progress toward discharge goals in nursing and provider rounds  The pt states that she does not need any HHc at this time  The pt requested and has been provided a lyft to return home

## 2020-01-28 NOTE — DISCHARGE SUMMARY
Discharge- Vivian Sagastume 1935, 81 y o  female MRN: [id number]    Unit/Bed#: -01 Encounter: [id number]    Primary Care Provider: Geovanna Cobb MD   Date and time admitted to hospital: 1/26/2020  1:13 PM        * Cellulitis of left wrist  Assessment & Plan  X-ray of the wrist, showed no evidence of acute nauseous abnormality  Upon presentation she appeared to have left wrist erythema redness and swelling concerning for acute cellulitis however she showed significant improvement in 24 hours with IV antibiotics  Discussed with infectious disease, can change to Keflex to complete the antibiotic course  Her she had elevated sed rate, CRP and uric acid level  Possible gout as alternative diagnosis however her symptoms improved with IV antibiotics,  Continue serial exams  If no improvement consider prednisone short course  Outpatient PCP follow-up after discharge  Monitor clinically      GERD (gastroesophageal reflux disease)  Assessment & Plan  Continue PPI    Hypertension  Assessment & Plan  Blood pressure appears to be stable  Continue lisinopril      Discharging Physician / Practitioner: Dalton Figueroa MD  PCP: Geovanna Cobb MD  Admission Date:   Admission Orders (From admission, onward)     Ordered        01/26/20 1633  Inpatient Admission  Once                   Discharge Date: 01/28/20    Resolved Problems  Date Reviewed: 1/28/2020    None          Consultations During Hospital Stay:  · Id  · ortho    Procedures Performed:   · none    Significant Findings / Test Results:   · X-ray of shoulder  X-ray of the wrist  Soft tissue swelling at the wrist   No acute osseous abnormality    Chest x-ray no acute cardiopulmonary disease  Incidental Findings:   · None    Test Results Pending at Discharge (will require follow up):   ·      Outpatient Tests Requested:  ·     Complications:  None    Reason for Admission:  Left wrist cellulitis    Hospital Course:     Vivian Sagastume is a 81 y o  female patient with past medical history of hypertension, osteoarthritis of knee who originally presented to the hospital on 1/26/2020 due to acute onset left wrist pain and swelling  Patient clinically improved with IV antibiotics  Imaging studies did not demonstrate any evidence of acute abnormality other than soft tissue swelling of the left wrist   She was seen by orthopedics and ID, recommended no further inpatient management at this time  Patient discharged oral antibiotics  Will need close outpatient follow-up  She had elevated CRP ESR, nonspecific likely secondary to her osteoarthritis  If continues to have any swelling of joints, patient recommended outpatient ortho evaluation  Please see above list of diagnoses and related plan for additional information  Condition at Discharge: stable     Discharge Day Visit / Exam:     Subjective: pt s/e, no acute complaints  Vitals: Blood Pressure: 106/52 (01/28/20 0700)  Pulse: 56 (01/28/20 0700)  Temperature: 98 2 °F (36 8 °C) (01/28/20 0700)  Temp Source: Oral (01/28/20 0700)  Respirations: 16 (01/28/20 0700)  Height: 5' 1" (154 9 cm) (01/26/20 1324)  Weight - Scale: 73 7 kg (162 lb 7 7 oz) (01/26/20 1324)  SpO2: 95 % (01/28/20 0700)  Exam:   Physical Exam   Constitutional: She is oriented to person, place, and time  She appears well-developed and well-nourished  HENT:   Head: Normocephalic and atraumatic  Eyes: Pupils are equal, round, and reactive to light  EOM are normal    Neck: Normal range of motion  Neck supple  Cardiovascular: Normal rate and regular rhythm  Pulmonary/Chest: Effort normal and breath sounds normal    Abdominal: Soft  Bowel sounds are normal    Musculoskeletal: Normal range of motion  Able to move wrist well    Neurological: She is alert and oriented to person, place, and time  Skin: Skin is warm and dry  No erythema       Discussion with Family: patient     Discharge instructions/Information to patient and family:   See after visit summary for information provided to patient and family  Provisions for Follow-Up Care:  See after visit summary for information related to follow-up care and any pertinent home health orders  Disposition:     Home    For Discharges to [facility]:   ·     Planned Readmission:      Discharge Statement:  I spent 25 minutes discharging the patient  This time was spent on the day of discharge  I had direct contact with the patient on the day of discharge  Greater than 50% of the total time was spent examining patient, answering all patient questions, arranging and discussing plan of care with patient as well as directly providing post-discharge instructions  Additional time then spent on discharge activities  Discharge Medications:  See after visit summary for reconciled discharge medications provided to patient and family        ** Please Note: This note has been constructed using a voice recognition system **

## 2020-01-28 NOTE — PROGRESS NOTES
Progress Note - Infectious Disease   Vivian Sagastume 81 y o  female MRN: [id number]  Unit/Bed#: -01 Encounter: [id number]      Impression/Recommendations:  1  Possible left wrist cellulitis  Patient was clinically much improved with IV antibiotic overnight  She remains well with p o  antibiotic transition  No evidence of septic wrist clinically  Patient remains systemically well, without toxicity or hemodynamic instability  Admission blood cultures remain negative  Continue p o  Keflex  Serial wrist exams  Follow-up on admission blood cultures  These will unlikely be positive given very stable clinical status  Treat x7 days total     2  Left wrist pain, most likely secondary to cellulitis above  Pain is much improved with antibiotic  No evidence of inflammatory arthritis or physical exam   Monitor wrist pain  3  Elevated ESR  It is unclear whether this is chronic/baseline or related to left wrist pain above  If wrist pain recurs, patient will need rheumatology evaluation for inflammatory arthritis  Otherwise, ESR to be monitored as an outpatient  Discussed with patient in detail regarding the above plan  Discussed with Dr Annis Schilder from [facility] for discharge from ID viewpoint  Antibiotics:  Keflex  Antibiotic # 3     Subjective:  Patient feels well  Left wrist with no further pain  Temperature stays down  No chills  She is tolerating antibiotic well  No nausea, vomiting or diarrhea  Objective:  Vitals:  Temp:  [98 2 °F (36 8 °C)-98 6 °F (37 °C)] 98 2 °F (36 8 °C)  HR:  [55-64] 56  Resp:  [16-18] 16  BP: (104-124)/(52-60) 106/52  SpO2:  [95 %-100 %] 95 %  Temp (24hrs), Av 4 °F (36 9 °C), Min:98 2 °F (36 8 °C), Max:98 6 °F (37 °C)  Current: Temperature: 98 2 °F (36 8 °C)    Physical Exam:     General: Awake, alert, cooperative, no distress  Neck:  Supple  No mass  No lymphadenopathy     Lungs: Expansion symmetric, no rales, no wheezing, respirations unlabored  Heart:  Regular rate and rhythm, S1 and S2 normal, no murmur  Abdomen: Soft, nondistended, non-tender, bowel sounds active all four quadrants,        no masses, no organomegaly  Extremities: Left wrist with mild edema and erythema  No warmth  No tenderness  ROM is full  Skin:  No rash  Neuro: Moves all extremities  Invasive Devices     Peripheral Intravenous Line            Peripheral IV 01/26/20 Right Antecubital 1 day                Labs studies:   I have personally reviewed pertinent labs  Results from last 7 days   Lab Units 01/28/20  0444 01/27/20  0507 01/26/20  1436   POTASSIUM mmol/L 4 4 3 6 3 5   CHLORIDE mmol/L 108 109* 101   CO2 mmol/L 26 24 27   BUN mg/dL 25 23 27*   CREATININE mg/dL 1 18 1 11 1 14   EGFR ml/min/1 73sq m 43 46 45   CALCIUM mg/dL 9 0 8 8 9 4   AST U/L  --   --  20   ALT U/L  --   --  21   ALK PHOS U/L  --   --  79     Results from last 7 days   Lab Units 01/28/20  0444 01/27/20  0507 01/26/20  1436   WBC Thousand/uL 9 65 10 30* 14 54*   HEMOGLOBIN g/dL 11 0* 10 7* 12 7   PLATELETS Thousands/uL 242 192 273     Results from last 7 days   Lab Units 01/26/20  1521 01/26/20  1436   BLOOD CULTURE  No Growth at 24 hrs  No Growth at 24 hrs  Imaging Studies:   I have personally reviewed pertinent imaging study reports and images in PACS  EKG, Pathology, and Other Studies:   I have personally reviewed pertinent reports

## 2020-01-28 NOTE — ASSESSMENT & PLAN NOTE
X-ray of the wrist, showed no evidence of acute nauseous abnormality  Upon presentation she appeared to have left wrist erythema redness and swelling concerning for acute cellulitis however she showed significant improvement in 24 hours with IV antibiotics  Discussed with infectious disease, can change to Keflex to complete the antibiotic course  Her she had elevated sed rate, CRP and uric acid level  Possible gout as alternative diagnosis however her symptoms improved with IV antibiotics,  Continue serial exams  If no improvement consider prednisone short course    Outpatient PCP follow-up after discharge  Monitor clinically

## 2020-01-28 NOTE — PLAN OF CARE
Problem: Prexisting or High Potential for Compromised Skin Integrity  Goal: Skin integrity is maintained or improved  Description  INTERVENTIONS:  - Identify patients at risk for skin breakdown  - Assess and monitor skin integrity  - Assess and monitor nutrition and hydration status  - Monitor labs   - Assess for incontinence   - Turn and reposition patient  - Assist with mobility/ambulation  - Relieve pressure over bony prominences  - Avoid friction and shearing  - Provide appropriate hygiene as needed including keeping skin clean and dry  - Evaluate need for skin moisturizer/barrier cream  - Collaborate with interdisciplinary team   - Patient/family teaching  - Consider wound care consult   Outcome: Progressing     Problem: PAIN - ADULT  Goal: Verbalizes/displays adequate comfort level or baseline comfort level  Description  Interventions:  - Encourage patient to monitor pain and request assistance  - Assess pain using appropriate pain scale  - Administer analgesics based on type and severity of pain and evaluate response  - Implement non-pharmacological measures as appropriate and evaluate response  - Consider cultural and social influences on pain and pain management  - Notify physician/advanced practitioner if interventions unsuccessful or patient reports new pain  Outcome: Progressing     Problem: INFECTION - ADULT  Goal: Absence or prevention of progression during hospitalization  Description  INTERVENTIONS:  - Assess and monitor for signs and symptoms of infection  - Monitor lab/diagnostic results  - Monitor all insertion sites, i e  indwelling lines, tubes, and drains  - Monitor endotracheal if appropriate and nasal secretions for changes in amount and color  - [place] appropriate cooling/warming therapies per order  - Administer medications as ordered  - Instruct and encourage patient and family to use good hand hygiene technique  - Identify and instruct in appropriate isolation precautions for identified infection/condition  Outcome: Progressing  Goal: Absence of fever/infection during neutropenic period  Description  INTERVENTIONS:  - Monitor WBC    Outcome: Progressing     Problem: SAFETY ADULT  Goal: Patient will remain free of falls  Description  INTERVENTIONS:  - Assess patient frequently for physical needs  -  Identify cognitive and physical deficits and behaviors that affect risk of falls    -  [place] fall precautions as indicated by assessment   - Educate patient/family on patient safety including physical limitations  - Instruct patient to call for assistance with activity based on assessment  - Modify environment to reduce risk of injury  - Consider OT/PT consult to assist with strengthening/mobility  Outcome: Progressing  Goal: Maintain or return to baseline ADL function  Description  INTERVENTIONS:  -  Assess patient's ability to carry out ADLs; assess patient's baseline for ADL function and identify physical deficits which impact ability to perform ADLs (bathing, care of mouth/teeth, toileting, grooming, dressing, etc )  - Assess/evaluate cause of self-care deficits   - Assess range of motion  - Assess patient's mobility; develop plan if impaired  - Assess patient's need for assistive devices and provide as appropriate  - Encourage maximum independence but intervene and supervise when necessary  - Involve family in performance of ADLs  - Assess for home care needs following discharge   - Consider OT consult to assist with ADL evaluation and planning for discharge  - Provide patient education as appropriate  Outcome: Progressing  Goal: Maintain or return mobility status to optimal level  Description  INTERVENTIONS:  - Assess patient's baseline mobility status (ambulation, transfers, stairs, etc )    - Identify cognitive and physical deficits and behaviors that affect mobility  - Identify mobility aids required to assist with transfers and/or ambulation (gait belt, sit-to-stand, lift, walker, cane, etc )  - [place] fall precautions as indicated by assessment  - Record patient progress and toleration of activity level on Mobility SBAR; progress patient to next Phase/Stage  - Instruct patient to call for assistance with activity based on assessment  - Consider rehabilitation consult to assist with strengthening/weightbearing, etc   Outcome: Progressing     Problem: DISCHARGE PLANNING  Goal: Discharge to home or other facility with appropriate resources  Description  INTERVENTIONS:  - Identify barriers to discharge w/patient and caregiver  - Arrange for needed discharge resources and transportation as appropriate  - Identify discharge learning needs (meds, wound care, etc )  - Arrange for interpretive services to assist at discharge as needed  - Refer to Case Management Department for coordinating discharge planning if the patient needs post-hospital services based on physician/advanced practitioner order or complex needs related to functional status, cognitive ability, or social support system  Outcome: Progressing     Problem: Knowledge Deficit  Goal: Patient/family/caregiver demonstrates understanding of disease process, treatment plan, medications, and discharge instructions  Description  Complete learning assessment and assess knowledge base    Interventions:  - Provide teaching at level of understanding  - Provide teaching via preferred learning methods  Outcome: Progressing     Problem: SKIN/TISSUE INTEGRITY - ADULT  Goal: Skin integrity remains intact  Description  INTERVENTIONS  - Identify patients at risk for skin breakdown  - Assess and monitor skin integrity  - Assess and monitor nutrition and hydration status  - Monitor labs (i e  albumin)  - Assess for incontinence   - Turn and reposition patient  - Assist with mobility/ambulation  - Relieve pressure over bony prominences  - Avoid friction and shearing  - Provide appropriate hygiene as needed including keeping skin clean and dry  - Evaluate need for skin moisturizer/barrier cream  - Collaborate with interdisciplinary team (i e  Nutrition, Rehabilitation, etc )   - Patient/family teaching  Outcome: Progressing     Problem: MUSCULOSKELETAL - ADULT  Goal: Maintain or return mobility to safest level of function  Description  INTERVENTIONS:  - Assess patient's ability to carry out ADLs; assess patient's baseline for ADL function and identify physical deficits which impact ability to perform ADLs (bathing, care of mouth/teeth, toileting, grooming, dressing, etc )  - Assess/evaluate cause of self-care deficits   - Assess range of motion  - Assess patient's mobility  - Assess patient's need for assistive devices and provide as appropriate  - Encourage maximum independence but intervene and supervise when necessary  - Involve family in performance of ADLs  - Assess for home care needs following discharge   - Consider OT consult to assist with ADL evaluation and planning for discharge  - Provide patient education as appropriate  Outcome: Progressing  Goal: Maintain proper alignment of affected body part  Description  INTERVENTIONS:  - Support, maintain and protect limb and body alignment  - Provide patient/ family with appropriate education  Outcome: Progressing

## 2020-01-29 NOTE — UTILIZATION REVIEW
Notification of Discharge  This is a Notification of Discharge from our facility [address]  Please be advised that this patient has been discharge from our facility  Below you will find the admission and discharge date and time including the patients disposition  PRESENTATION DATE: 1/26/2020  1:13 PM  OBS ADMISSION DATE:   IP ADMISSION DATE: 1/26/20 1633   DISCHARGE DATE: 1/28/2020 12:02 PM  DISPOSITION: Home/Self Care Home/Self Care   Admission Orders listed below:  Admission Orders (From admission, onward)     Ordered        01/26/20 1633  Inpatient Admission  Once                   Please contact the UR Department if additional information is required to close this patient's authorization/case  [address] [facility] Utilization Review Department  Main: [phone] x carefully listen to the prompts  All voicemails are confidential   [email or web address]  Send all requests for admission clinical reviews, approved or denied determinations and any other requests to dedicated fax number below belonging to the campus where the patient is receiving treatment   List of dedicated fax numbers:  [address] DENIALS (Administrative/Medical Necessity) [phone]   [address] (Maternity/NICU/Pediatrics) [phone]   Stephon Sunburst [phone]   [facility] [phone]   Nada Specter [phone]   [address] [facility] [address] [facility] [phone]   [facility]  [phone]   [address] [facility] [address]  [address] [facility] [address] [facility] [phone]

## 2020-01-31 LAB
BACTERIA BLD CULT: NORMAL
BACTERIA BLD CULT: NORMAL

## 2020-06-01 ENCOUNTER — HOSPITAL ENCOUNTER (INPATIENT)
Facility: HOSPITAL | Age: 85
LOS: 1 days | Discharge: HOME | DRG: 603 | End: 2020-06-02
Attending: EMERGENCY MEDICINE | Admitting: FAMILY MEDICINE
Payer: COMMERCIAL

## 2020-06-01 ENCOUNTER — APPOINTMENT (OUTPATIENT)
Dept: ULTRASOUND IMAGING | Facility: HOSPITAL | Age: 85
DRG: 603 | End: 2020-06-01
Payer: COMMERCIAL

## 2020-06-01 ENCOUNTER — APPOINTMENT (EMERGENCY)
Dept: RADIOLOGY | Facility: HOSPITAL | Age: 85
DRG: 603 | End: 2020-06-01
Payer: COMMERCIAL

## 2020-06-01 DIAGNOSIS — L03.113 CELLULITIS OF RIGHT HAND: Primary | ICD-10-CM

## 2020-06-01 PROBLEM — I50.9 CHF (CONGESTIVE HEART FAILURE) (HCC): Status: ACTIVE | Noted: 2017-06-09

## 2020-06-01 LAB
ALBUMIN SERPL BCP-MCNC: 2.6 G/DL (ref 3.5–5)
ALP SERPL-CCNC: 110 U/L (ref 46–116)
ALT SERPL W P-5'-P-CCNC: 55 U/L (ref 12–78)
ANION GAP SERPL CALCULATED.3IONS-SCNC: 11 MMOL/L (ref 4–13)
APTT PPP: 30 SECONDS (ref 23–37)
AST SERPL W P-5'-P-CCNC: 42 U/L (ref 5–45)
ATRIAL RATE: 57 BPM
BASOPHILS # BLD AUTO: 0.07 THOUSANDS/ΜL (ref 0–0.1)
BASOPHILS NFR BLD AUTO: 1 % (ref 0–1)
BILIRUB SERPL-MCNC: 0.3 MG/DL (ref 0.2–1)
BUN SERPL-MCNC: 36 MG/DL (ref 5–25)
CALCIUM SERPL-MCNC: 9 MG/DL (ref 8.3–10.1)
CHLORIDE SERPL-SCNC: 103 MMOL/L (ref 100–108)
CO2 SERPL-SCNC: 29 MMOL/L (ref 21–32)
CREAT SERPL-MCNC: 1.09 MG/DL (ref 0.6–1.3)
EOSINOPHIL # BLD AUTO: 0.06 THOUSAND/ΜL (ref 0–0.61)
EOSINOPHIL NFR BLD AUTO: 1 % (ref 0–6)
ERYTHROCYTE [DISTWIDTH] IN BLOOD BY AUTOMATED COUNT: 12.9 % (ref 11.6–15.1)
GFR SERPL CREATININE-BSD FRML MDRD: 47 ML/MIN/1.73SQ M
GLUCOSE SERPL-MCNC: 108 MG/DL (ref 65–140)
HCT VFR BLD AUTO: 38.8 % (ref 34.8–46.1)
HGB BLD-MCNC: 12.3 G/DL (ref 11.5–15.4)
IMM GRANULOCYTES # BLD AUTO: 0.04 THOUSAND/UL (ref 0–0.2)
IMM GRANULOCYTES NFR BLD AUTO: 1 % (ref 0–2)
INR PPP: 1.14 (ref 0.84–1.19)
LACTATE SERPL-SCNC: 0.9 MMOL/L (ref 0.5–2)
LYMPHOCYTES # BLD AUTO: 1.5 THOUSANDS/ΜL (ref 0.6–4.47)
LYMPHOCYTES NFR BLD AUTO: 17 % (ref 14–44)
MCH RBC QN AUTO: 31.5 PG (ref 26.8–34.3)
MCHC RBC AUTO-ENTMCNC: 31.7 G/DL (ref 31.4–37.4)
MCV RBC AUTO: 99 FL (ref 82–98)
MONOCYTES # BLD AUTO: 0.82 THOUSAND/ΜL (ref 0.17–1.22)
MONOCYTES NFR BLD AUTO: 9 % (ref 4–12)
NEUTROPHILS # BLD AUTO: 6.22 THOUSANDS/ΜL (ref 1.85–7.62)
NEUTS SEG NFR BLD AUTO: 71 % (ref 43–75)
NRBC BLD AUTO-RTO: 0 /100 WBCS
P AXIS: 65 DEGREES
PLATELET # BLD AUTO: 654 THOUSANDS/UL (ref 149–390)
PMV BLD AUTO: 12.2 FL (ref 8.9–12.7)
POTASSIUM SERPL-SCNC: 4.1 MMOL/L (ref 3.5–5.3)
PR INTERVAL: 144 MS
PROT SERPL-MCNC: 7.8 G/DL (ref 6.4–8.2)
PROTHROMBIN TIME: 14.6 SECONDS (ref 11.6–14.5)
QRS AXIS: 30 DEGREES
QRSD INTERVAL: 70 MS
QT INTERVAL: 398 MS
QTC INTERVAL: 387 MS
RBC # BLD AUTO: 3.91 MILLION/UL (ref 3.81–5.12)
SARS-COV-2 RNA RESP QL NAA+PROBE: NEGATIVE
SODIUM SERPL-SCNC: 143 MMOL/L (ref 136–145)
T WAVE AXIS: 20 DEGREES
TROPONIN I SERPL-MCNC: <0.02 NG/ML
VENTRICULAR RATE: 57 BPM
WBC # BLD AUTO: 8.71 THOUSAND/UL (ref 4.31–10.16)

## 2020-06-01 PROCEDURE — 83605 ASSAY OF LACTIC ACID: CPT | Performed by: EMERGENCY MEDICINE

## 2020-06-01 PROCEDURE — 93971 EXTREMITY STUDY: CPT

## 2020-06-01 PROCEDURE — 93005 ELECTROCARDIOGRAM TRACING: CPT

## 2020-06-01 PROCEDURE — 93010 ELECTROCARDIOGRAM REPORT: CPT | Performed by: INTERNAL MEDICINE

## 2020-06-01 PROCEDURE — 80053 COMPREHEN METABOLIC PANEL: CPT

## 2020-06-01 PROCEDURE — 85730 THROMBOPLASTIN TIME PARTIAL: CPT | Performed by: EMERGENCY MEDICINE

## 2020-06-01 PROCEDURE — 99285 EMERGENCY DEPT VISIT HI MDM: CPT

## 2020-06-01 PROCEDURE — 87040 BLOOD CULTURE FOR BACTERIA: CPT | Performed by: EMERGENCY MEDICINE

## 2020-06-01 PROCEDURE — 85025 COMPLETE CBC W/AUTO DIFF WBC: CPT

## 2020-06-01 PROCEDURE — 87635 SARS-COV-2 COVID-19 AMP PRB: CPT | Performed by: EMERGENCY MEDICINE

## 2020-06-01 PROCEDURE — 99285 EMERGENCY DEPT VISIT HI MDM: CPT | Performed by: EMERGENCY MEDICINE

## 2020-06-01 PROCEDURE — 84484 ASSAY OF TROPONIN QUANT: CPT | Performed by: EMERGENCY MEDICINE

## 2020-06-01 PROCEDURE — 73130 X-RAY EXAM OF HAND: CPT

## 2020-06-01 PROCEDURE — 36415 COLL VENOUS BLD VENIPUNCTURE: CPT

## 2020-06-01 PROCEDURE — 96367 TX/PROPH/DG ADDL SEQ IV INF: CPT

## 2020-06-01 PROCEDURE — 96365 THER/PROPH/DIAG IV INF INIT: CPT

## 2020-06-01 PROCEDURE — 93971 EXTREMITY STUDY: CPT | Performed by: SURGERY

## 2020-06-01 PROCEDURE — 99220 PR INITIAL OBSERVATION CARE/DAY 70 MINUTES: CPT | Performed by: FAMILY MEDICINE

## 2020-06-01 PROCEDURE — 85610 PROTHROMBIN TIME: CPT | Performed by: EMERGENCY MEDICINE

## 2020-06-01 RX ORDER — ACETAMINOPHEN 325 MG/1
650 TABLET ORAL EVERY 6 HOURS PRN
Status: DISCONTINUED | OUTPATIENT
Start: 2020-06-01 | End: 2020-06-02 | Stop reason: HOSPADM

## 2020-06-01 RX ORDER — LISINOPRIL 20 MG/1
20 TABLET ORAL DAILY
Status: DISCONTINUED | OUTPATIENT
Start: 2020-06-01 | End: 2020-06-02 | Stop reason: HOSPADM

## 2020-06-01 RX ORDER — OLANZAPINE 2.5 MG/1
5 TABLET ORAL 2 TIMES DAILY PRN
Status: DISCONTINUED | OUTPATIENT
Start: 2020-06-01 | End: 2020-06-02 | Stop reason: HOSPADM

## 2020-06-01 RX ORDER — VANCOMYCIN HYDROCHLORIDE 1 G/200ML
15 INJECTION, SOLUTION INTRAVENOUS EVERY 12 HOURS
Status: DISCONTINUED | OUTPATIENT
Start: 2020-06-01 | End: 2020-06-01

## 2020-06-01 RX ORDER — ONDANSETRON 2 MG/ML
4 INJECTION INTRAMUSCULAR; INTRAVENOUS EVERY 6 HOURS PRN
Status: DISCONTINUED | OUTPATIENT
Start: 2020-06-01 | End: 2020-06-02 | Stop reason: HOSPADM

## 2020-06-01 RX ORDER — FUROSEMIDE 20 MG/1
20 TABLET ORAL DAILY
Status: DISCONTINUED | OUTPATIENT
Start: 2020-06-01 | End: 2020-06-02 | Stop reason: HOSPADM

## 2020-06-01 RX ORDER — PANTOPRAZOLE SODIUM 40 MG/1
40 TABLET, DELAYED RELEASE ORAL
Status: DISCONTINUED | OUTPATIENT
Start: 2020-06-01 | End: 2020-06-02 | Stop reason: HOSPADM

## 2020-06-01 RX ORDER — ATORVASTATIN CALCIUM 10 MG/1
10 TABLET, FILM COATED ORAL DAILY
Status: DISCONTINUED | OUTPATIENT
Start: 2020-06-01 | End: 2020-06-02 | Stop reason: HOSPADM

## 2020-06-01 RX ORDER — FUROSEMIDE 20 MG/1
20 TABLET ORAL DAILY
COMMUNITY

## 2020-06-01 RX ORDER — CLOPIDOGREL BISULFATE 75 MG/1
75 TABLET ORAL DAILY
Status: DISCONTINUED | OUTPATIENT
Start: 2020-06-01 | End: 2020-06-02 | Stop reason: HOSPADM

## 2020-06-01 RX ORDER — ASPIRIN 81 MG/1
81 TABLET, CHEWABLE ORAL DAILY
Status: DISCONTINUED | OUTPATIENT
Start: 2020-06-01 | End: 2020-06-02 | Stop reason: HOSPADM

## 2020-06-01 RX ORDER — DOCUSATE SODIUM 100 MG/1
100 CAPSULE, LIQUID FILLED ORAL 2 TIMES DAILY
Status: DISCONTINUED | OUTPATIENT
Start: 2020-06-01 | End: 2020-06-02 | Stop reason: HOSPADM

## 2020-06-01 RX ORDER — CEFAZOLIN SODIUM 2 G/50ML
2000 SOLUTION INTRAVENOUS EVERY 8 HOURS
Status: DISCONTINUED | OUTPATIENT
Start: 2020-06-01 | End: 2020-06-02

## 2020-06-01 RX ORDER — VANCOMYCIN HYDROCHLORIDE 1 G/200ML
15 INJECTION, SOLUTION INTRAVENOUS ONCE
Status: COMPLETED | OUTPATIENT
Start: 2020-06-01 | End: 2020-06-01

## 2020-06-01 RX ORDER — OXYBUTYNIN CHLORIDE 5 MG/1
10 TABLET, EXTENDED RELEASE ORAL DAILY
Status: DISCONTINUED | OUTPATIENT
Start: 2020-06-01 | End: 2020-06-02 | Stop reason: HOSPADM

## 2020-06-01 RX ORDER — LORAZEPAM 2 MG/ML
0.5 INJECTION INTRAMUSCULAR EVERY 6 HOURS PRN
Status: DISCONTINUED | OUTPATIENT
Start: 2020-06-01 | End: 2020-06-02 | Stop reason: HOSPADM

## 2020-06-01 RX ORDER — CEFAZOLIN SODIUM 1 G/50ML
1000 SOLUTION INTRAVENOUS EVERY 8 HOURS
Status: DISCONTINUED | OUTPATIENT
Start: 2020-06-01 | End: 2020-06-01

## 2020-06-01 RX ORDER — ASPIRIN 81 MG/1
81 TABLET, CHEWABLE ORAL
COMMUNITY
Start: 2019-12-04

## 2020-06-01 RX ADMIN — CEFAZOLIN SODIUM 2000 MG: 2 SOLUTION INTRAVENOUS at 20:19

## 2020-06-01 RX ADMIN — OXYBUTYNIN CHLORIDE 10 MG: 5 TABLET, EXTENDED RELEASE ORAL at 09:30

## 2020-06-01 RX ADMIN — LISINOPRIL 20 MG: 20 TABLET ORAL at 09:29

## 2020-06-01 RX ADMIN — CEFAZOLIN SODIUM 2000 MG: 2 SOLUTION INTRAVENOUS at 12:52

## 2020-06-01 RX ADMIN — FUROSEMIDE 20 MG: 40 TABLET ORAL at 09:28

## 2020-06-01 RX ADMIN — DOCUSATE SODIUM 100 MG: 100 CAPSULE, LIQUID FILLED ORAL at 09:29

## 2020-06-01 RX ADMIN — VANCOMYCIN HYDROCHLORIDE 1000 MG: 1 INJECTION, SOLUTION INTRAVENOUS at 05:06

## 2020-06-01 RX ADMIN — CLOPIDOGREL BISULFATE 75 MG: 75 TABLET ORAL at 09:28

## 2020-06-01 RX ADMIN — CEFEPIME HYDROCHLORIDE 2000 MG: 2 INJECTION, POWDER, FOR SOLUTION INTRAVENOUS at 04:40

## 2020-06-01 RX ADMIN — ATORVASTATIN CALCIUM 10 MG: 10 TABLET, FILM COATED ORAL at 09:29

## 2020-06-01 RX ADMIN — OLANZAPINE 5 MG: 2.5 TABLET, FILM COATED ORAL at 14:11

## 2020-06-01 RX ADMIN — PANTOPRAZOLE SODIUM 40 MG: 40 TABLET, DELAYED RELEASE ORAL at 09:27

## 2020-06-01 RX ADMIN — LORAZEPAM 0.5 MG: 2 INJECTION INTRAMUSCULAR; INTRAVENOUS at 10:32

## 2020-06-01 RX ADMIN — ENOXAPARIN SODIUM 40 MG: 40 INJECTION SUBCUTANEOUS at 09:31

## 2020-06-01 RX ADMIN — ASPIRIN 81 MG 81 MG: 81 TABLET ORAL at 09:28

## 2020-06-01 RX ADMIN — ACETAMINOPHEN 650 MG: 325 TABLET, FILM COATED ORAL at 15:47

## 2020-06-01 RX ADMIN — DOCUSATE SODIUM 100 MG: 100 CAPSULE, LIQUID FILLED ORAL at 18:08

## 2020-06-02 VITALS
WEIGHT: 157.85 LBS | OXYGEN SATURATION: 97 % | TEMPERATURE: 98.5 F | HEART RATE: 52 BPM | DIASTOLIC BLOOD PRESSURE: 54 MMHG | SYSTOLIC BLOOD PRESSURE: 125 MMHG | BODY MASS INDEX: 29.8 KG/M2 | RESPIRATION RATE: 16 BRPM | HEIGHT: 61 IN

## 2020-06-02 LAB
ANION GAP SERPL CALCULATED.3IONS-SCNC: 11 MMOL/L (ref 4–13)
BASOPHILS # BLD AUTO: 0.08 THOUSANDS/ΜL (ref 0–0.1)
BASOPHILS NFR BLD AUTO: 1 % (ref 0–1)
BUN SERPL-MCNC: 28 MG/DL (ref 5–25)
CALCIUM SERPL-MCNC: 9.1 MG/DL (ref 8.3–10.1)
CHLORIDE SERPL-SCNC: 109 MMOL/L (ref 100–108)
CO2 SERPL-SCNC: 27 MMOL/L (ref 21–32)
CREAT SERPL-MCNC: 0.97 MG/DL (ref 0.6–1.3)
EOSINOPHIL # BLD AUTO: 0.17 THOUSAND/ΜL (ref 0–0.61)
EOSINOPHIL NFR BLD AUTO: 2 % (ref 0–6)
ERYTHROCYTE [DISTWIDTH] IN BLOOD BY AUTOMATED COUNT: 12.7 % (ref 11.6–15.1)
GFR SERPL CREATININE-BSD FRML MDRD: 54 ML/MIN/1.73SQ M
GLUCOSE SERPL-MCNC: 83 MG/DL (ref 65–140)
HCT VFR BLD AUTO: 37.8 % (ref 34.8–46.1)
HGB BLD-MCNC: 12 G/DL (ref 11.5–15.4)
IMM GRANULOCYTES # BLD AUTO: 0.03 THOUSAND/UL (ref 0–0.2)
IMM GRANULOCYTES NFR BLD AUTO: 0 % (ref 0–2)
LYMPHOCYTES # BLD AUTO: 1.53 THOUSANDS/ΜL (ref 0.6–4.47)
LYMPHOCYTES NFR BLD AUTO: 22 % (ref 14–44)
MCH RBC QN AUTO: 31.9 PG (ref 26.8–34.3)
MCHC RBC AUTO-ENTMCNC: 31.7 G/DL (ref 31.4–37.4)
MCV RBC AUTO: 101 FL (ref 82–98)
MONOCYTES # BLD AUTO: 0.79 THOUSAND/ΜL (ref 0.17–1.22)
MONOCYTES NFR BLD AUTO: 11 % (ref 4–12)
NEUTROPHILS # BLD AUTO: 4.47 THOUSANDS/ΜL (ref 1.85–7.62)
NEUTS SEG NFR BLD AUTO: 64 % (ref 43–75)
NRBC BLD AUTO-RTO: 0 /100 WBCS
PLATELET # BLD AUTO: 345 THOUSANDS/UL (ref 149–390)
PMV BLD AUTO: 9.2 FL (ref 8.9–12.7)
POTASSIUM SERPL-SCNC: 3.9 MMOL/L (ref 3.5–5.3)
RBC # BLD AUTO: 3.76 MILLION/UL (ref 3.81–5.12)
SODIUM SERPL-SCNC: 147 MMOL/L (ref 136–145)
WBC # BLD AUTO: 7.07 THOUSAND/UL (ref 4.31–10.16)

## 2020-06-02 PROCEDURE — 80048 BASIC METABOLIC PNL TOTAL CA: CPT | Performed by: PHYSICIAN ASSISTANT

## 2020-06-02 PROCEDURE — 99239 HOSP IP/OBS DSCHRG MGMT >30: CPT | Performed by: INTERNAL MEDICINE

## 2020-06-02 PROCEDURE — 85025 COMPLETE CBC W/AUTO DIFF WBC: CPT | Performed by: PHYSICIAN ASSISTANT

## 2020-06-02 RX ORDER — CEPHALEXIN 500 MG/1
500 CAPSULE ORAL EVERY 12 HOURS SCHEDULED
Qty: 21 CAPSULE | Refills: 0 | Status: SHIPPED | OUTPATIENT
Start: 2020-06-02 | End: 2020-06-02

## 2020-06-02 RX ORDER — CEPHALEXIN 500 MG/1
500 CAPSULE ORAL EVERY 8 HOURS SCHEDULED
Status: DISCONTINUED | OUTPATIENT
Start: 2020-06-02 | End: 2020-06-02

## 2020-06-02 RX ORDER — CEPHALEXIN 500 MG/1
500 CAPSULE ORAL EVERY 12 HOURS SCHEDULED
Qty: 14 CAPSULE | Refills: 0 | Status: SHIPPED | OUTPATIENT
Start: 2020-06-02 | End: 2020-06-09

## 2020-06-02 RX ORDER — CEPHALEXIN 500 MG/1
500 CAPSULE ORAL EVERY 12 HOURS SCHEDULED
Status: DISCONTINUED | OUTPATIENT
Start: 2020-06-02 | End: 2020-06-02 | Stop reason: HOSPADM

## 2020-06-02 RX ADMIN — LORAZEPAM 0.5 MG: 2 INJECTION INTRAMUSCULAR; INTRAVENOUS at 09:30

## 2020-06-02 RX ADMIN — CLOPIDOGREL BISULFATE 75 MG: 75 TABLET ORAL at 09:30

## 2020-06-02 RX ADMIN — FUROSEMIDE 20 MG: 40 TABLET ORAL at 09:31

## 2020-06-02 RX ADMIN — OXYBUTYNIN CHLORIDE 10 MG: 5 TABLET, EXTENDED RELEASE ORAL at 09:30

## 2020-06-02 RX ADMIN — ASPIRIN 81 MG 81 MG: 81 TABLET ORAL at 09:30

## 2020-06-02 RX ADMIN — PANTOPRAZOLE SODIUM 40 MG: 40 TABLET, DELAYED RELEASE ORAL at 05:54

## 2020-06-02 RX ADMIN — ATORVASTATIN CALCIUM 10 MG: 10 TABLET, FILM COATED ORAL at 09:30

## 2020-06-02 RX ADMIN — DOCUSATE SODIUM 100 MG: 100 CAPSULE, LIQUID FILLED ORAL at 09:30

## 2020-06-02 RX ADMIN — CEFAZOLIN SODIUM 2000 MG: 2 SOLUTION INTRAVENOUS at 05:54

## 2020-06-02 RX ADMIN — VANCOMYCIN HYDROCHLORIDE 750 MG: 750 INJECTION, SOLUTION INTRAVENOUS at 06:42

## 2020-06-02 RX ADMIN — ENOXAPARIN SODIUM 40 MG: 40 INJECTION SUBCUTANEOUS at 09:29

## 2020-06-02 RX ADMIN — LISINOPRIL 20 MG: 20 TABLET ORAL at 09:30

## 2020-06-06 LAB
BACTERIA BLD CULT: NORMAL
BACTERIA BLD CULT: NORMAL

## 2021-02-12 DIAGNOSIS — Z23 ENCOUNTER FOR IMMUNIZATION: ICD-10-CM

## 2021-05-17 ENCOUNTER — APPOINTMENT (EMERGENCY)
Dept: RADIOLOGY | Facility: HOSPITAL | Age: 86
End: 2021-05-17
Payer: COMMERCIAL

## 2021-05-17 ENCOUNTER — HOSPITAL ENCOUNTER (EMERGENCY)
Facility: HOSPITAL | Age: 86
Discharge: HOME | End: 2021-05-17
Attending: EMERGENCY MEDICINE
Payer: COMMERCIAL

## 2021-05-17 VITALS
RESPIRATION RATE: 18 BRPM | SYSTOLIC BLOOD PRESSURE: 124 MMHG | HEART RATE: 69 BPM | TEMPERATURE: 98.4 F | DIASTOLIC BLOOD PRESSURE: 66 MMHG | OXYGEN SATURATION: 98 %

## 2021-05-17 DIAGNOSIS — M25.562 BILATERAL KNEE PAIN: Primary | ICD-10-CM

## 2021-05-17 DIAGNOSIS — M25.561 BILATERAL KNEE PAIN: Primary | ICD-10-CM

## 2021-05-17 PROCEDURE — 99284 EMERGENCY DEPT VISIT MOD MDM: CPT

## 2021-05-17 PROCEDURE — 73564 X-RAY EXAM KNEE 4 OR MORE: CPT

## 2021-05-17 PROCEDURE — 99284 EMERGENCY DEPT VISIT MOD MDM: CPT | Performed by: PHYSICIAN ASSISTANT

## 2021-05-17 RX ADMIN — DICLOFENAC SODIUM 2 G: 10 GEL TOPICAL at 17:39

## 2021-05-17 NOTE — ED PROVIDER NOTES
History  Chief Complaint   Patient presents with    Knee Pain     Pt arrives via EMS from home due to bilateral knee pain that started three days ago  Pt denies any injury      82 yo with left knee pain  Initially triage note stated both knees but pt states it is only left knee  Started about 3 days ago  Constant  Swelling  No erythema or warmth  No injuries  Never had this pain before  No calf pain  No hip pain  States she thinks she "needs an injection or something in the knee"  History provided by:  Patient   used: Yes (Gabonese)    Knee Pain  Location:  Knee  Time since incident:  1 week  Injury: no    Knee location:  L knee  Pain details:     Quality:  Aching    Radiates to:  Does not radiate    Severity:  Moderate    Onset quality:  Gradual    Duration:  1 week    Timing:  Intermittent    Progression:  Waxing and waning  Chronicity:  New  Dislocation: no    Prior injury to area:  No  Relieved by:  Rest  Worsened by:  Bearing weight, flexion and extension  Ineffective treatments:  None tried  Associated symptoms: swelling    Associated symptoms: no back pain, no decreased ROM, no fatigue, no fever, no itching, no muscle weakness, no neck pain, no numbness, no stiffness and no tingling        Prior to Admission Medications   Prescriptions Last Dose Informant Patient Reported?  Taking?   aspirin 81 mg chewable tablet   Yes No   Sig: Chew 81 mg   atorvastatin (LIPITOR) 10 mg tablet   Yes No   Sig: Take 10 mg by mouth daily   clopidogrel (PLAVIX) 75 mg tablet   Yes No   Sig: Take 75 mg by mouth daily   furosemide (LASIX) 20 mg tablet   Yes No   Sig: Take 20 mg by mouth daily   lisinopril (ZESTRIL) 20 mg tablet   Yes No   Sig: Take 20 mg by mouth daily   omeprazole (PriLOSEC) 40 MG capsule   Yes No   Sig: Take 40 mg by mouth daily   tolterodine (DETROL LA) 4 mg 24 hr capsule   Yes No   Sig: Take 4 mg by mouth daily      Facility-Administered Medications: None       Past Medical History: Diagnosis Date    CHF (congestive heart failure) (HCC)     Hyperlipidemia     Hypertension     Knee pain     right       Past Surgical History:   Procedure Laterality Date    BACK SURGERY      KNEE SURGERY         History reviewed  No pertinent family history  I have reviewed and agree with the history as documented  E-Cigarette/Vaping    E-Cigarette Use Never User      E-Cigarette/Vaping Substances     Social History     Tobacco Use    Smoking status: Never Smoker    Smokeless tobacco: Never Used   Substance Use Topics    Alcohol use: Never     Frequency: Never    Drug use: Never       Review of Systems   Constitutional: Negative for chills, fatigue and fever  HENT: Negative for ear pain and sore throat  Eyes: Negative for pain and visual disturbance  Respiratory: Negative for cough and shortness of breath  Cardiovascular: Negative for chest pain and palpitations  Gastrointestinal: Negative for abdominal pain and vomiting  Genitourinary: Negative for dysuria and hematuria  Musculoskeletal: Negative for arthralgias, back pain, neck pain and stiffness  Left knee pain for 1 week  Skin: Negative for color change, itching and rash  Neurological: Negative for seizures and syncope  All other systems reviewed and are negative  Physical Exam  Physical Exam  Vitals signs and nursing note reviewed  Constitutional:       General: She is not in acute distress  Appearance: She is well-developed  HENT:      Head: Normocephalic and atraumatic  Eyes:      Conjunctiva/sclera: Conjunctivae normal    Neck:      Musculoskeletal: Neck supple  Cardiovascular:      Rate and Rhythm: Normal rate and regular rhythm  Pulses:           Dorsalis pedis pulses are 2+ on the left side  Heart sounds: No murmur  Pulmonary:      Effort: Pulmonary effort is normal  No respiratory distress  Breath sounds: Normal breath sounds  Abdominal:      Palpations: Abdomen is soft  Tenderness: There is no abdominal tenderness  Musculoskeletal:      Left hip: Normal       Left knee: She exhibits effusion (trace)  She exhibits normal range of motion, no swelling, no ecchymosis, no deformity and no bony tenderness  Tenderness found  Patellar tendon tenderness noted  No medial joint line, no lateral joint line, no MCL and no LCL tenderness noted  Left lower leg: Normal         Legs:    Skin:     General: Skin is warm and dry  Comments: No rash  Neurological:      Mental Status: She is alert  Vital Signs  ED Triage Vitals [05/17/21 1644]   Temperature Pulse Respirations Blood Pressure SpO2   98 4 °F (36 9 °C) 69 18 124/66 98 %      Temp Source Heart Rate Source Patient Position - Orthostatic VS BP Location FiO2 (%)   Oral Monitor -- Right arm --      Pain Score       --           Vitals:    05/17/21 1644   BP: 124/66   Pulse: 69         Visual Acuity      ED Medications  Medications   Diclofenac Sodium (VOLTAREN) 1 % topical gel 2 g (2 g Topical Given 5/17/21 1739)       Diagnostic Studies  Results Reviewed     None                 XR knee 4+ vw left injury    (Results Pending)   XR knee 4+ vw right injury    (Results Pending)              Procedures  Procedures         ED Course  ED Course as of May 17 2335   Mon May 17, 2021   1716 Now stating to the xray tech that its the right knee                                SBIRT 22yo+      Most Recent Value   SBIRT (25 yo +)   In order to provide better care to our patients, we are screening all of our patients for alcohol and drug use  Would it be okay to ask you these screening questions?   No Filed at: 05/17/2021 1827                    MDM  Number of Diagnoses or Management Options  Bilateral knee pain: new and requires workup  Diagnosis management comments: Differential diagnosis including but not limited to: Gout, arthritis, Lyme disease, Lupus, rheumatoid arthritis, cellulitis, bursitis, tendinitis, dislocation, fracture, sprain, strain, DVT, Baker's cyst; doubt septic arthritis or arterial occlusion  Plan: XR  Voltaren gel  Doubt infectious etiology  Doubt lyme  No calf pain/posterior pain to suggest DVT  Dispo pending  Amount and/or Complexity of Data Reviewed  Tests in the radiology section of CPT®: reviewed and ordered  Independent visualization of images, tracings, or specimens: yes    Risk of Complications, Morbidity, and/or Mortality  Presenting problems: low  Management options: low  General comments: 80 yo with left knee pain  Initially denied bilateral but later also complained of right knee pain  Imaging obtained of both  Right knee without acute abnormalities  Left knee also without acute abnormalities but does have arthritic changes  Recommended voltaren gel and ortho follow up for further evaluation  No signs of septic arthritis  Doubt gout  Doubt lyme  Return parameters provided  Pt understands and agrees with plan  Doubt DVT    Patient Progress  Patient progress: stable      Disposition  Final diagnoses:   Bilateral knee pain     Time reflects when diagnosis was documented in both MDM as applicable and the Disposition within this note     Time User Action Codes Description Comment    5/17/2021  6:08 PM Maia Sky Add [O01 [id number],  M25 [id number]] Bilateral knee pain       ED Disposition     ED Disposition Condition Date/Time Comment    Discharge Stable Mon May 17, 2021  6:08 PM Michelle Pisano discharge to home/self care              Follow-up Information     Follow up With Specialties Details Why Contact Info Additional [address] [facility] Call in 1 day  [facility] [phone] [facility], [id number] [place] [address], [phone]          Discharge Medication List as of 5/17/2021  6:09 PM      START taking these medications    Details   Diclofenac Sodium (VOLTAREN) 1 % Apply 2 g topically daily as needed (knee pain), Starting Mon 5/17/2021, Print         CONTINUE these medications which have NOT CHANGED    Details   aspirin 81 mg chewable tablet Chew 81 mg, Starting Wed 12/4/2019, Historical Med      atorvastatin (LIPITOR) 10 mg tablet Take 10 mg by mouth daily, Until Discontinued, Historical Med      clopidogrel (PLAVIX) 75 mg tablet Take 75 mg by mouth daily, Until Discontinued, Historical Med      furosemide (LASIX) 20 mg tablet Take 20 mg by mouth daily, Historical Med      lisinopril (ZESTRIL) 20 mg tablet Take 20 mg by mouth daily, Until Discontinued, Historical Med      omeprazole (PriLOSEC) 40 MG capsule Take 40 mg by mouth daily, Until Discontinued, Historical Med      tolterodine (DETROL LA) 4 mg 24 hr capsule Take 4 mg by mouth daily, Until Discontinued, Historical Med           No discharge procedures on file      PDMP Review     None          ED Provider  Electronically Signed by           Rita Antonio PA-C  05/17/21 9099

## 2021-05-17 NOTE — ED NOTES
With the help of the  phone the pt states she has bilateral knee pain that started three days ago that affects her walking  Pt denies any other pain   Pt does not know what kind of medication she takes and is having trouble using the  phone      Joshua Singh RN  05/17/21 0360

## 2024-03-18 ENCOUNTER — HOSPITAL ENCOUNTER (INPATIENT)
Facility: HOSPITAL | Age: 89
LOS: 3 days | Discharge: NURSING, REHAB OR LONG-TERM CARE | DRG: 309 | End: 2024-03-22
Attending: EMERGENCY MEDICINE | Admitting: STUDENT IN AN ORGANIZED HEALTH CARE EDUCATION/TRAINING PROGRAM
Payer: COMMERCIAL

## 2024-03-18 ENCOUNTER — APPOINTMENT (OUTPATIENT)
Dept: RADIOLOGY | Facility: HOSPITAL | Age: 89
DRG: 309 | End: 2024-03-18
Payer: COMMERCIAL

## 2024-03-18 ENCOUNTER — APPOINTMENT (EMERGENCY)
Dept: CT IMAGING | Facility: HOSPITAL | Age: 89
DRG: 309 | End: 2024-03-18
Payer: COMMERCIAL

## 2024-03-18 DIAGNOSIS — L89.90 DECUBITUS ULCER: ICD-10-CM

## 2024-03-18 DIAGNOSIS — R26.2 AMBULATORY DYSFUNCTION: ICD-10-CM

## 2024-03-18 DIAGNOSIS — I48.91 NEW ONSET A-FIB (HCC): Primary | ICD-10-CM

## 2024-03-18 DIAGNOSIS — R53.81 PHYSICAL DECONDITIONING: ICD-10-CM

## 2024-03-18 PROBLEM — S31.000A SACRAL WOUND: Status: ACTIVE | Noted: 2024-03-18

## 2024-03-18 PROBLEM — R53.1 GENERALIZED WEAKNESS: Status: ACTIVE | Noted: 2024-03-18

## 2024-03-18 LAB
ALBUMIN SERPL BCP-MCNC: 3.9 G/DL (ref 3.5–5)
ALP SERPL-CCNC: 107 U/L (ref 34–104)
ALT SERPL W P-5'-P-CCNC: 10 U/L (ref 7–52)
ANION GAP SERPL CALCULATED.3IONS-SCNC: 9 MMOL/L (ref 4–13)
APTT PPP: 27 SECONDS (ref 23–37)
AST SERPL W P-5'-P-CCNC: 21 U/L (ref 13–39)
BACTERIA UR QL AUTO: ABNORMAL /HPF
BASOPHILS # BLD AUTO: 0.06 THOUSANDS/ÂΜL (ref 0–0.1)
BASOPHILS NFR BLD AUTO: 1 % (ref 0–1)
BILIRUB DIRECT SERPL-MCNC: 0.1 MG/DL (ref 0–0.2)
BILIRUB SERPL-MCNC: 0.83 MG/DL (ref 0.2–1)
BILIRUB UR QL STRIP: NEGATIVE
BUN SERPL-MCNC: 18 MG/DL (ref 5–25)
CALCIUM SERPL-MCNC: 9.5 MG/DL (ref 8.4–10.2)
CARDIAC TROPONIN I PNL SERPL HS: 14 NG/L
CARDIAC TROPONIN I PNL SERPL HS: 31 NG/L
CHLORIDE SERPL-SCNC: 104 MMOL/L (ref 96–108)
CLARITY UR: CLEAR
CO2 SERPL-SCNC: 26 MMOL/L (ref 21–32)
COLOR UR: ABNORMAL
CREAT SERPL-MCNC: 1 MG/DL (ref 0.6–1.3)
EOSINOPHIL # BLD AUTO: 0.04 THOUSAND/ÂΜL (ref 0–0.61)
EOSINOPHIL NFR BLD AUTO: 0 % (ref 0–6)
ERYTHROCYTE [DISTWIDTH] IN BLOOD BY AUTOMATED COUNT: 13.3 % (ref 11.6–15.1)
FLUAV RNA RESP QL NAA+PROBE: NEGATIVE
FLUBV RNA RESP QL NAA+PROBE: NEGATIVE
GFR SERPL CREATININE-BSD FRML MDRD: 50 ML/MIN/1.73SQ M
GLUCOSE SERPL-MCNC: 93 MG/DL (ref 65–140)
GLUCOSE UR STRIP-MCNC: NEGATIVE MG/DL
HCT VFR BLD AUTO: 42.5 % (ref 34.8–46.1)
HGB BLD-MCNC: 13.8 G/DL (ref 11.5–15.4)
HGB UR QL STRIP.AUTO: ABNORMAL
IMM GRANULOCYTES # BLD AUTO: 0.03 THOUSAND/UL (ref 0–0.2)
IMM GRANULOCYTES NFR BLD AUTO: 0 % (ref 0–2)
INR PPP: 1.06 (ref 0.84–1.19)
KETONES UR STRIP-MCNC: NEGATIVE MG/DL
LACTATE SERPL-SCNC: 1.2 MMOL/L (ref 0.5–2)
LEUKOCYTE ESTERASE UR QL STRIP: ABNORMAL
LYMPHOCYTES # BLD AUTO: 2.09 THOUSANDS/ÂΜL (ref 0.6–4.47)
LYMPHOCYTES NFR BLD AUTO: 18 % (ref 14–44)
MCH RBC QN AUTO: 30.8 PG (ref 26.8–34.3)
MCHC RBC AUTO-ENTMCNC: 32.5 G/DL (ref 31.4–37.4)
MCV RBC AUTO: 95 FL (ref 82–98)
MONOCYTES # BLD AUTO: 0.83 THOUSAND/ÂΜL (ref 0.17–1.22)
MONOCYTES NFR BLD AUTO: 7 % (ref 4–12)
NEUTROPHILS # BLD AUTO: 8.81 THOUSANDS/ÂΜL (ref 1.85–7.62)
NEUTS SEG NFR BLD AUTO: 74 % (ref 43–75)
NITRITE UR QL STRIP: NEGATIVE
NON-SQ EPI CELLS URNS QL MICRO: ABNORMAL /HPF
NRBC BLD AUTO-RTO: 0 /100 WBCS
PH UR STRIP.AUTO: 5.5 [PH]
PLATELET # BLD AUTO: 301 THOUSANDS/UL (ref 149–390)
PMV BLD AUTO: 9.4 FL (ref 8.9–12.7)
POTASSIUM SERPL-SCNC: 4.3 MMOL/L (ref 3.5–5.3)
PROT SERPL-MCNC: 7.7 G/DL (ref 6.4–8.4)
PROT UR STRIP-MCNC: NEGATIVE MG/DL
PROTHROMBIN TIME: 14.4 SECONDS (ref 11.6–14.5)
RBC # BLD AUTO: 4.48 MILLION/UL (ref 3.81–5.12)
RBC #/AREA URNS AUTO: ABNORMAL /HPF
RSV RNA RESP QL NAA+PROBE: NEGATIVE
SARS-COV-2 RNA RESP QL NAA+PROBE: NEGATIVE
SODIUM SERPL-SCNC: 139 MMOL/L (ref 135–147)
SP GR UR STRIP.AUTO: 1.01 (ref 1–1.03)
UROBILINOGEN UR STRIP-ACNC: <2 MG/DL
WBC # BLD AUTO: 11.86 THOUSAND/UL (ref 4.31–10.16)
WBC #/AREA URNS AUTO: ABNORMAL /HPF

## 2024-03-18 PROCEDURE — 80048 BASIC METABOLIC PNL TOTAL CA: CPT | Performed by: EMERGENCY MEDICINE

## 2024-03-18 PROCEDURE — 83605 ASSAY OF LACTIC ACID: CPT | Performed by: EMERGENCY MEDICINE

## 2024-03-18 PROCEDURE — 81001 URINALYSIS AUTO W/SCOPE: CPT | Performed by: EMERGENCY MEDICINE

## 2024-03-18 PROCEDURE — 93005 ELECTROCARDIOGRAM TRACING: CPT

## 2024-03-18 PROCEDURE — 99285 EMERGENCY DEPT VISIT HI MDM: CPT | Performed by: EMERGENCY MEDICINE

## 2024-03-18 PROCEDURE — 85025 COMPLETE CBC W/AUTO DIFF WBC: CPT | Performed by: EMERGENCY MEDICINE

## 2024-03-18 PROCEDURE — 0241U HB NFCT DS VIR RESP RNA 4 TRGT: CPT | Performed by: EMERGENCY MEDICINE

## 2024-03-18 PROCEDURE — 36415 COLL VENOUS BLD VENIPUNCTURE: CPT | Performed by: EMERGENCY MEDICINE

## 2024-03-18 PROCEDURE — 80076 HEPATIC FUNCTION PANEL: CPT | Performed by: EMERGENCY MEDICINE

## 2024-03-18 PROCEDURE — 84484 ASSAY OF TROPONIN QUANT: CPT | Performed by: STUDENT IN AN ORGANIZED HEALTH CARE EDUCATION/TRAINING PROGRAM

## 2024-03-18 PROCEDURE — 85730 THROMBOPLASTIN TIME PARTIAL: CPT | Performed by: EMERGENCY MEDICINE

## 2024-03-18 PROCEDURE — 99223 1ST HOSP IP/OBS HIGH 75: CPT | Performed by: STUDENT IN AN ORGANIZED HEALTH CARE EDUCATION/TRAINING PROGRAM

## 2024-03-18 PROCEDURE — 87040 BLOOD CULTURE FOR BACTERIA: CPT | Performed by: EMERGENCY MEDICINE

## 2024-03-18 PROCEDURE — 84484 ASSAY OF TROPONIN QUANT: CPT | Performed by: EMERGENCY MEDICINE

## 2024-03-18 PROCEDURE — 70450 CT HEAD/BRAIN W/O DYE: CPT

## 2024-03-18 PROCEDURE — 99285 EMERGENCY DEPT VISIT HI MDM: CPT

## 2024-03-18 PROCEDURE — 85610 PROTHROMBIN TIME: CPT | Performed by: EMERGENCY MEDICINE

## 2024-03-18 PROCEDURE — 71045 X-RAY EXAM CHEST 1 VIEW: CPT

## 2024-03-18 PROCEDURE — 96360 HYDRATION IV INFUSION INIT: CPT

## 2024-03-18 RX ORDER — LANOLIN ALCOHOL/MO/W.PET/CERES
12 CREAM (GRAM) TOPICAL
Status: DISCONTINUED | OUTPATIENT
Start: 2024-03-18 | End: 2024-03-22 | Stop reason: HOSPADM

## 2024-03-18 RX ORDER — CHLORHEXIDINE GLUCONATE 4 %
12 LIQUID (ML) TOPICAL
COMMUNITY

## 2024-03-18 RX ORDER — TRAZODONE HYDROCHLORIDE 100 MG/1
100 TABLET ORAL
COMMUNITY

## 2024-03-18 RX ORDER — CLOPIDOGREL BISULFATE 75 MG/1
75 TABLET ORAL DAILY
Status: DISCONTINUED | OUTPATIENT
Start: 2024-03-19 | End: 2024-03-22

## 2024-03-18 RX ORDER — HEPARIN SODIUM 5000 [USP'U]/ML
5000 INJECTION, SOLUTION INTRAVENOUS; SUBCUTANEOUS EVERY 8 HOURS SCHEDULED
Status: DISCONTINUED | OUTPATIENT
Start: 2024-03-18 | End: 2024-03-22

## 2024-03-18 RX ORDER — ATORVASTATIN CALCIUM 10 MG/1
10 TABLET, FILM COATED ORAL DAILY
Status: DISCONTINUED | OUTPATIENT
Start: 2024-03-19 | End: 2024-03-22 | Stop reason: HOSPADM

## 2024-03-18 RX ORDER — TRAZODONE HYDROCHLORIDE 100 MG/1
100 TABLET ORAL
Status: DISCONTINUED | OUTPATIENT
Start: 2024-03-18 | End: 2024-03-22 | Stop reason: HOSPADM

## 2024-03-18 RX ORDER — ASPIRIN 81 MG/1
81 TABLET, CHEWABLE ORAL DAILY
Status: DISCONTINUED | OUTPATIENT
Start: 2024-03-19 | End: 2024-03-22

## 2024-03-18 RX ADMIN — METOPROLOL TARTRATE 25 MG: 25 TABLET, FILM COATED ORAL at 22:45

## 2024-03-18 RX ADMIN — Medication 12 MG: at 22:44

## 2024-03-18 RX ADMIN — HEPARIN SODIUM 5000 UNITS: 5000 INJECTION INTRAVENOUS; SUBCUTANEOUS at 22:45

## 2024-03-18 RX ADMIN — SODIUM CHLORIDE 1000 ML: 0.9 INJECTION, SOLUTION INTRAVENOUS at 15:54

## 2024-03-18 RX ADMIN — TRAZODONE HYDROCHLORIDE 100 MG: 100 TABLET ORAL at 22:45

## 2024-03-18 NOTE — ED PROVIDER NOTES
"History  Chief Complaint   Patient presents with    Weakness - Generalized     Per family pt seems \"pff, pt has dementia, per family pt has a bedsore and state that pt is c/o pain and does not want to move around much, they are concern that something else may be going on, pt is primarily Syriac speaking     87 yo female with dementia who presents to ED for evaluation of several concerns. Family reports significant functional and cognitive decline in the last month including unwillingness to get out of bed today which is very atypical for pt. They also note bedsores on the R hip which are new this week. No fever. No focal or acute sxs or concerns. History from son and daughter in law at bedside.         Prior to Admission Medications   Prescriptions Last Dose Informant Patient Reported? Taking?   Melatonin 12 MG TABS   Yes Yes   Sig: Take 12 mg by mouth daily at bedtime   aspirin 81 mg chewable tablet   Yes Yes   Sig: Chew 81 mg   atorvastatin (LIPITOR) 10 mg tablet   Yes Yes   Sig: Take 10 mg by mouth daily   traZODone (DESYREL) 100 mg tablet   Yes Yes   Sig: Take 100 mg by mouth daily at bedtime      Facility-Administered Medications: None       Past Medical History:   Diagnosis Date    CHF (congestive heart failure) (HCC)     Hyperlipidemia     Hypertension     Knee pain     right       Past Surgical History:   Procedure Laterality Date    BACK SURGERY      FL GUIDED NEEDLE PLAC BX/ASP/INJ  3/7/2019    KNEE SURGERY         History reviewed. No pertinent family history.  I have reviewed and agree with the history as documented.    E-Cigarette/Vaping    E-Cigarette Use Never User      E-Cigarette/Vaping Substances     Social History     Tobacco Use    Smoking status: Never    Smokeless tobacco: Never   Vaping Use    Vaping status: Never Used   Substance Use Topics    Alcohol use: Never    Drug use: Never       Review of Systems   Unable to perform ROS: Dementia       Physical Exam  Physical Exam  Vitals and nursing " note reviewed.   Constitutional:       General: She is not in acute distress.     Appearance: She is well-developed. She is not ill-appearing, toxic-appearing or diaphoretic.   HENT:      Head: Normocephalic and atraumatic.      Mouth/Throat:      Mouth: Mucous membranes are moist.      Pharynx: Oropharynx is clear.   Eyes:      Conjunctiva/sclera: Conjunctivae normal.      Pupils: Pupils are equal, round, and reactive to light.   Neck:      Vascular: No JVD.   Cardiovascular:      Rate and Rhythm: Normal rate. Rhythm irregular.      Pulses: Normal pulses.      Heart sounds: Normal heart sounds.   Pulmonary:      Effort: Pulmonary effort is normal. No respiratory distress.      Breath sounds: Normal breath sounds. No stridor. No wheezing or rhonchi.   Abdominal:      General: There is no distension.      Palpations: Abdomen is soft.      Tenderness: There is no abdominal tenderness. There is no guarding or rebound.   Musculoskeletal:         General: No tenderness or deformity. Normal range of motion.      Cervical back: Normal range of motion and neck supple. No rigidity.   Skin:     General: Skin is warm and dry.      Capillary Refill: Capillary refill takes less than 2 seconds.      Coloration: Skin is not jaundiced or pale.      Findings: No bruising, erythema or rash.      Comments: 2-3cm open wound over R hip. No drainage. No crepitus or fluctuance. No surrounding cellulitis.    Neurological:      General: No focal deficit present.      Mental Status: She is alert. She is disoriented.      Cranial Nerves: No cranial nerve deficit.      Sensory: No sensory deficit.      Motor: No weakness or abnormal muscle tone.      Coordination: Coordination normal.      Gait: Gait normal.         Vital Signs  ED Triage Vitals   Temperature Pulse Respirations Blood Pressure SpO2   03/18/24 1509 03/18/24 1508 03/18/24 1508 03/18/24 1508 03/18/24 1508   97.8 °F (36.6 °C) 97 16 (!) 196/87 97 %      Temp Source Heart Rate Source  Patient Position - Orthostatic VS BP Location FiO2 (%)   03/18/24 1509 03/18/24 1508 03/18/24 1508 03/18/24 1508 --   Oral Monitor Lying Right arm       Pain Score       --                  Vitals:    03/18/24 1508 03/18/24 1630   BP: (!) 196/87 (!) 187/72   Pulse: 97 (!) 111   Patient Position - Orthostatic VS: Lying Lying         Visual Acuity      ED Medications  Medications   sodium chloride 0.9 % bolus 1,000 mL (1,000 mL Intravenous New Bag 3/18/24 1554)       Diagnostic Studies  Results Reviewed       Procedure Component Value Units Date/Time    Urine Microscopic [[id number]]  (Abnormal) Collected: 03/18/24 1710    Lab Status: Final result Specimen: Urine, Clean Catch Updated: 03/18/24 1734     RBC, UA 10-20 /hpf      WBC, UA 4-10 /hpf      Epithelial Cells Occasional /hpf      Bacteria, UA Occasional /hpf     UA (URINE) with reflex to Scope [[id number]]  (Abnormal) Collected: 03/18/24 1710    Lab Status: Final result Specimen: Urine, Clean Catch Updated: 03/18/24 1726     Color, UA Light Yellow     Clarity, UA Clear     Specific Gravity, UA 1.015     pH, UA 5.5     Leukocytes, UA Moderate     Nitrite, UA Negative     Protein, UA Negative mg/dl      Glucose, UA Negative mg/dl      Ketones, UA Negative mg/dl      Urobilinogen, UA <2.0 mg/dl      Bilirubin, UA Negative     Occult Blood, UA Small    FLU/RSV/COVID - if FLU/RSV clinically relevant [[id number]]  (Normal) Collected: 03/18/24 1552    Lab Status: Final result Specimen: Nares from Nose Updated: 03/18/24 1639     SARS-CoV-2 Negative     INFLUENZA A PCR Negative     INFLUENZA B PCR Negative     RSV PCR Negative    Narrative:      FOR PEDIATRIC PATIENTS - copy/paste COVID Guidelines URL to browser: [email or web address]    SARS-CoV-2 assay is a Nucleic Acid Amplification assay intended for the  qualitative detection of nucleic acid from SARS-CoV-2 in nasopharyngeal  swabs. Results are for the presumptive  identification of SARS-CoV-2 RNA.    Positive results are indicative of infection with SARS-CoV-2, the virus  causing COVID-19, but do not rule out bacterial infection or co-infection  with other viruses. Laboratories within the [place] and its  territories are required to report all positive results to the appropriate  public health authorities. Negative results do not preclude SARS-CoV-2  infection and should not be used as the sole basis for treatment or other  patient management decisions. Negative results must be combined with  clinical observations, patient history, and epidemiological information.  This test has not been FDA cleared or approved.    This test has been authorized by FDA under an Emergency Use Authorization  (EUA). This test is only authorized for the duration of time the  declaration that circumstances exist justifying the authorization of the  emergency use of an in vitro diagnostic tests for detection of SARS-CoV-2  virus and/or diagnosis of COVID-19 infection under section 564(b)(1) of  the Act, 21 U.S.C. 360bbb-3(b)(1), unless the authorization is terminated  or revoked sooner. The test has been validated but independent review by FDA  and CLIA is pending.    Test performed using [employer] Gene[employer]pert: This RT-PCR assay targets N2,  a region unique to SARS-CoV-2. A conserved region in the E-gene was chosen  for pan-Sarbecovirus detection which includes SARS-CoV-2.    According to CMS-2020-01-R, this platform meets the definition of high-throughput technology.    HS Troponin 0hr (reflex protocol) [[id number]]  (Normal) Collected: 03/18/24 1546    Lab Status: Final result Specimen: Blood from Arm, Left Updated: 03/18/24 1621     hs TnI 0hr 14 ng/L     Protime-INR [[id number]]  (Normal) Collected: 03/18/24 1546    Lab Status: Final result Specimen: Blood from Arm, Left Updated: 03/18/24 1617     Protime 14.4 seconds      INR 1.06    APTT [[id number]]  (Normal) Collected: 03/18/24 1546    Lab  Status: Final result Specimen: Blood from Arm, Left Updated: 03/18/24 1617     PTT 27 seconds     Lactic acid, plasma (w/reflex if result > 2.0) [[id number]]  (Normal) Collected: 03/18/24 1546    Lab Status: Final result Specimen: Blood from Arm, Left Updated: 03/18/24 1615     LACTIC ACID 1.2 mmol/L     Narrative:      Result may be elevated if tourniquet was used during collection.    Basic metabolic panel [[id number]] Collected: 03/18/24 1546    Lab Status: Final result Specimen: Blood from Arm, Left Updated: 03/18/24 1615     Sodium 139 mmol/L      Potassium 4.3 mmol/L      Chloride 104 mmol/L      CO2 26 mmol/L      ANION GAP 9 mmol/L      BUN 18 mg/dL      Creatinine 1.00 mg/dL      Glucose 93 mg/dL      Calcium 9.5 mg/dL      eGFR 50 ml/min/1.73sq m     Narrative:      National Kidney Disease Foundation guidelines for Chronic Kidney Disease (CKD):     Stage 1 with normal or high GFR (GFR > 90 mL/min/1.73 square meters)    Stage 2 Mild CKD (GFR = 60-89 mL/min/1.73 square meters)    Stage 3A Moderate CKD (GFR = 45-59 mL/min/1.73 square meters)    Stage 3B Moderate CKD (GFR = 30-44 mL/min/1.73 square meters)    Stage 4 Severe CKD (GFR = 15-29 mL/min/1.73 square meters)    Stage 5 End Stage CKD (GFR <15 mL/min/1.73 square meters)  Note: GFR calculation is accurate only with a steady state creatinine    Hepatic function panel [[id number]]  (Abnormal) Collected: 03/18/24 1546    Lab Status: Final result Specimen: Blood from Arm, Left Updated: 03/18/24 1615     Total Bilirubin 0.83 mg/dL      Bilirubin, Direct 0.10 mg/dL      Alkaline Phosphatase 107 U/L      AST 21 U/L      ALT 10 U/L      Total Protein 7.7 g/dL      Albumin 3.9 g/dL     Blood culture #2 [[id number]] Collected: 03/18/24 1552    Lab Status: In process Specimen: Blood from Arm, Left Updated: 03/18/24 1559    CBC and differential [[id number]]  (Abnormal) Collected: 03/18/24 1546    Lab Status: Final result Specimen: Blood from Arm, Left Updated: 03/18/24  1554     WBC 11.86 Thousand/uL      RBC 4.48 Million/uL      Hemoglobin 13.8 g/dL      Hematocrit 42.5 %      MCV 95 fL      MCH 30.8 pg      MCHC 32.5 g/dL      RDW 13.3 %      MPV 9.4 fL      Platelets 301 Thousands/uL      nRBC 0 /100 WBCs      Neutrophils Relative 74 %      Immature Grans % 0 %      Lymphocytes Relative 18 %      Monocytes Relative 7 %      Eosinophils Relative 0 %      Basophils Relative 1 %      Neutrophils Absolute 8.81 Thousands/µL      Absolute Immature Grans 0.03 Thousand/uL      Absolute Lymphocytes 2.09 Thousands/µL      Absolute Monocytes 0.83 Thousand/µL      Eosinophils Absolute 0.04 Thousand/µL      Basophils Absolute 0.06 Thousands/µL     Blood culture #1 [[id number]] Collected: 03/18/24 1546    Lab Status: In process Specimen: Blood from Arm, Left Updated: 03/18/24 1550                   CT head without contrast   Final Result by Jae Jimenez MD (03/18 1655)      No acute intracranial abnormality. Stable findings as above.                  Workstation performed: [id number]                    Procedures  ECG 12 Lead Documentation Only    Date/Time: 3/18/2024 4:24 PM    Performed by: Galdino Lawrence MD  Authorized by: Galdino Lawrence MD    Indications / Diagnosis:  Weakness  ECG reviewed by me, the ED Provider: yes    Patient location:  ED  Previous ECG:     Previous ECG:  Compared to current    Comparison ECG info:  1 june 2020    Similarity:  Changes noted  Interpretation:     Interpretation: non-specific    Rate:     ECG rate:  94    ECG rate assessment: normal    Rhythm:     Rhythm: atrial fibrillation    Comments:      Afib has replaced SR           ED Course                               SBIRT 22yo+      Flowsheet Row Most Recent Value   Initial Alcohol Screen: US AUDIT-C     1. How often do you have a drink containing alcohol? 0 Filed at: 03/18/2024 1618   2. How many drinks containing alcohol do you have on a typical day you are drinking?  0 Filed at: 03/18/2024 1618   3b.  FEMALE Any Age, or MALE 65+: How often do you have 4 or more drinks on one occassion? 0 Filed at: 03/18/2024 1618   Audit-C Score 0 Filed at: 03/18/2024 1618   MONIKA: How many times in the past year have you...    Used an illegal drug or used a prescription medication for non-medical reasons? Never Filed at: 03/18/2024 1618                      Medical Decision Making  Problems Addressed:  Ambulatory dysfunction: acute illness or injury  Decubitus ulcer: acute illness or injury  New onset a-fib (HCC): acute illness or injury that poses a threat to life or bodily functions     Details: No AC due to severe fall risk  Physical deconditioning: acute illness or injury    Amount and/or Complexity of Data Reviewed  Labs: ordered.  Radiology: ordered.    Risk  Decision regarding hospitalization.             Disposition  Final diagnoses:   New onset a-fib (HCC)   Physical deconditioning   Decubitus ulcer   Ambulatory dysfunction     Time reflects when diagnosis was documented in both MDM as applicable and the Disposition within this note       Time User Action Codes Description Comment    3/18/2024  5:47 PM Lawrence, Galdino Add [I48.91] New onset a-fib (HCC)     3/18/2024  5:47 PM Lawrence, Galdino Add [R53.81] Physical deconditioning     3/18/2024  5:47 PM Lawrence, Galdino Add [L89.90] Decubitus ulcer     3/18/2024  5:47 PM Lawrence, Galdino Add [R26.2] Ambulatory dysfunction           ED Disposition       ED Disposition   Admit    Condition   Stable    Date/Time   Mon Mar 18, 2024 0098    Comment   Case was discussed with Dr Burdick and the patient's admission status was agreed to be Admission Status: observation status to the service of Dr. Burdick .               Follow-up Information    None         Patient's Medications   Discharge Prescriptions    No medications on file       No discharge procedures on file.    PDMP Review       None            ED Provider  Electronically Signed by             Galdino Lawrence MD  03/18/24 0102

## 2024-03-19 ENCOUNTER — APPOINTMENT (OUTPATIENT)
Dept: NON INVASIVE DIAGNOSTICS | Facility: HOSPITAL | Age: 89
DRG: 309 | End: 2024-03-19
Payer: COMMERCIAL

## 2024-03-19 ENCOUNTER — APPOINTMENT (OUTPATIENT)
Dept: RADIOLOGY | Facility: HOSPITAL | Age: 89
DRG: 309 | End: 2024-03-19
Payer: COMMERCIAL

## 2024-03-19 LAB
25(OH)D3 SERPL-MCNC: 23.1 NG/ML (ref 30–100)
2HR DELTA HS TROPONIN: 1 NG/L
4HR DELTA HS TROPONIN: 2 NG/L
ANION GAP SERPL CALCULATED.3IONS-SCNC: 11 MMOL/L (ref 4–13)
AORTIC ROOT: 2.8 CM
AORTIC VALVE MEAN VELOCITY: 11.2 M/S
APICAL FOUR CHAMBER EJECTION FRACTION: 56 %
AV AREA BY CONTINUOUS VTI: 1.5 CM2
AV AREA PEAK VELOCITY: 1.2 CM2
AV LVOT MEAN GRADIENT: 1 MMHG
AV LVOT PEAK GRADIENT: 2 MMHG
AV MEAN GRADIENT: 6 MMHG
AV PEAK GRADIENT: 12 MMHG
AV REGURGITATION PRESSURE HALF TIME: 444 MS
AV VALVE AREA: 1.46 CM2
AV VELOCITY RATIO: 0.45
BSA FOR ECHO PROCEDURE: 1.68 M2
BUN SERPL-MCNC: 17 MG/DL (ref 5–25)
CALCIUM SERPL-MCNC: 8.8 MG/DL (ref 8.4–10.2)
CARDIAC TROPONIN I PNL SERPL HS: 32 NG/L
CARDIAC TROPONIN I PNL SERPL HS: 33 NG/L
CHLORIDE SERPL-SCNC: 103 MMOL/L (ref 96–108)
CO2 SERPL-SCNC: 23 MMOL/L (ref 21–32)
CREAT SERPL-MCNC: 0.9 MG/DL (ref 0.6–1.3)
DOP CALC AO PEAK VEL: 1.72 M/S
DOP CALC AO VTI: 30.02 CM
DOP CALC LVOT AREA: 2.83 CM2
DOP CALC LVOT CARDIAC INDEX: 2.29 L/MIN/M2
DOP CALC LVOT CARDIAC OUTPUT: 3.87 L/MIN
DOP CALC LVOT DIAMETER: 1.9 CM
DOP CALC LVOT PEAK VEL VTI: 15.44 CM
DOP CALC LVOT PEAK VEL: 0.77 M/S
DOP CALC LVOT STROKE INDEX: 26.6 ML/M2
DOP CALC LVOT STROKE VOLUME: 43.75
E WAVE DECELERATION TIME: 118 MS
E/A RATIO: 3.69
ERYTHROCYTE [DISTWIDTH] IN BLOOD BY AUTOMATED COUNT: 13.2 % (ref 11.6–15.1)
FOLATE SERPL-MCNC: 9.1 NG/ML
FRACTIONAL SHORTENING: 36 (ref 28–44)
GFR SERPL CREATININE-BSD FRML MDRD: 57 ML/MIN/1.73SQ M
GLUCOSE P FAST SERPL-MCNC: 103 MG/DL (ref 65–99)
GLUCOSE SERPL-MCNC: 103 MG/DL (ref 65–140)
HCT VFR BLD AUTO: 38.1 % (ref 34.8–46.1)
HGB BLD-MCNC: 12.7 G/DL (ref 11.5–15.4)
INTERVENTRICULAR SEPTUM IN DIASTOLE (PARASTERNAL SHORT AXIS VIEW): 0.8 CM
INTERVENTRICULAR SEPTUM: 0.8 CM (ref 0.6–1.1)
LAAS-AP2: 24.6 CM2
LAAS-AP4: 25.1 CM2
LEFT ATRIUM AREA SYSTOLE SINGLE PLANE A4C: 25.7 CM2
LEFT ATRIUM SIZE: 4.7 CM
LEFT ATRIUM VOLUME (MOD BIPLANE): 82 ML
LEFT ATRIUM VOLUME INDEX (MOD BIPLANE): 48.5 ML/M2
LEFT INTERNAL DIMENSION IN SYSTOLE: 2.7 CM (ref 2.1–4)
LEFT VENTRICULAR INTERNAL DIMENSION IN DIASTOLE: 4.2 CM (ref 3.5–6)
LEFT VENTRICULAR POSTERIOR WALL IN END DIASTOLE: 0.8 CM
LEFT VENTRICULAR STROKE VOLUME: 51 ML
LVSV (TEICH): 51 ML
MCH RBC QN AUTO: 31.6 PG (ref 26.8–34.3)
MCHC RBC AUTO-ENTMCNC: 33.3 G/DL (ref 31.4–37.4)
MCV RBC AUTO: 95 FL (ref 82–98)
MITRAL REGURGITATION PEAK VELOCITY: 5.4 M/S
MITRAL VALVE MEAN INFLOW VELOCITY: 4.37 M/S
MITRAL VALVE REGURGITANT PEAK GRADIENT: 117 MMHG
MV E'TISSUE VEL-LAT: 16 CM/S
MV E'TISSUE VEL-SEP: 11 CM/S
MV PEAK A VEL: 0.26 M/S
MV PEAK E VEL: 96 CM/S
MV STENOSIS PRESSURE HALF TIME: 34 MS
MV VALVE AREA P 1/2 METHOD: 6.47
PLATELET # BLD AUTO: 276 THOUSANDS/UL (ref 149–390)
PMV BLD AUTO: 9.3 FL (ref 8.9–12.7)
POTASSIUM SERPL-SCNC: 3.5 MMOL/L (ref 3.5–5.3)
RBC # BLD AUTO: 4.02 MILLION/UL (ref 3.81–5.12)
RIGHT ATRIUM AREA SYSTOLE A4C: 16.6 CM2
RIGHT VENTRICLE ID DIMENSION: 3.2 CM
SL CV AV DECELERATION TIME RETROGRADE: 1532 MS
SL CV AV PEAK GRADIENT RETROGRADE: 91 MMHG
SL CV DOP CALC MV VTI RETROGRADE: 181.4 CM
SL CV LEFT ATRIUM LENGTH A2C: 6.1 CM
SL CV LV EF: 55
SL CV MV MEAN GRADIENT RETROGRADE: 83 MMHG
SL CV PED ECHO LEFT VENTRICLE DIASTOLIC VOLUME (MOD BIPLANE) 2D: 77 ML
SL CV PED ECHO LEFT VENTRICLE SYSTOLIC VOLUME (MOD BIPLANE) 2D: 26 ML
SODIUM SERPL-SCNC: 137 MMOL/L (ref 135–147)
TR MAX PG: 29 MMHG
TR PEAK VELOCITY: 2.7 M/S
TRICUSPID ANNULAR PLANE SYSTOLIC EXCURSION: 1.7 CM
TRICUSPID VALVE PEAK REGURGITATION VELOCITY: 2.68 M/S
TSH SERPL DL<=0.05 MIU/L-ACNC: 1.8 UIU/ML (ref 0.45–4.5)
VIT B12 SERPL-MCNC: 335 PG/ML (ref 180–914)
WBC # BLD AUTO: 11.92 THOUSAND/UL (ref 4.31–10.16)

## 2024-03-19 PROCEDURE — 85027 COMPLETE CBC AUTOMATED: CPT | Performed by: STUDENT IN AN ORGANIZED HEALTH CARE EDUCATION/TRAINING PROGRAM

## 2024-03-19 PROCEDURE — 93306 TTE W/DOPPLER COMPLETE: CPT

## 2024-03-19 PROCEDURE — 99232 SBSQ HOSP IP/OBS MODERATE 35: CPT | Performed by: INTERNAL MEDICINE

## 2024-03-19 PROCEDURE — 73521 X-RAY EXAM HIPS BI 2 VIEWS: CPT

## 2024-03-19 PROCEDURE — 82607 VITAMIN B-12: CPT | Performed by: STUDENT IN AN ORGANIZED HEALTH CARE EDUCATION/TRAINING PROGRAM

## 2024-03-19 PROCEDURE — 80048 BASIC METABOLIC PNL TOTAL CA: CPT | Performed by: STUDENT IN AN ORGANIZED HEALTH CARE EDUCATION/TRAINING PROGRAM

## 2024-03-19 PROCEDURE — 84484 ASSAY OF TROPONIN QUANT: CPT | Performed by: STUDENT IN AN ORGANIZED HEALTH CARE EDUCATION/TRAINING PROGRAM

## 2024-03-19 PROCEDURE — 82746 ASSAY OF FOLIC ACID SERUM: CPT | Performed by: STUDENT IN AN ORGANIZED HEALTH CARE EDUCATION/TRAINING PROGRAM

## 2024-03-19 PROCEDURE — 84443 ASSAY THYROID STIM HORMONE: CPT | Performed by: STUDENT IN AN ORGANIZED HEALTH CARE EDUCATION/TRAINING PROGRAM

## 2024-03-19 PROCEDURE — 93306 TTE W/DOPPLER COMPLETE: CPT | Performed by: INTERNAL MEDICINE

## 2024-03-19 PROCEDURE — 82306 VITAMIN D 25 HYDROXY: CPT | Performed by: STUDENT IN AN ORGANIZED HEALTH CARE EDUCATION/TRAINING PROGRAM

## 2024-03-19 RX ORDER — LIDOCAINE 50 MG/G
1 PATCH TOPICAL DAILY
Status: DISCONTINUED | OUTPATIENT
Start: 2024-03-19 | End: 2024-03-22 | Stop reason: HOSPADM

## 2024-03-19 RX ADMIN — HEPARIN SODIUM 5000 UNITS: 5000 INJECTION INTRAVENOUS; SUBCUTANEOUS at 22:55

## 2024-03-19 RX ADMIN — METOPROLOL TARTRATE 25 MG: 25 TABLET, FILM COATED ORAL at 09:04

## 2024-03-19 RX ADMIN — METOPROLOL TARTRATE 25 MG: 25 TABLET, FILM COATED ORAL at 22:55

## 2024-03-19 RX ADMIN — Medication 12 MG: at 22:55

## 2024-03-19 RX ADMIN — ASPIRIN 81 MG: 81 TABLET, CHEWABLE ORAL at 09:04

## 2024-03-19 RX ADMIN — LIDOCAINE 5% 1 PATCH: 700 PATCH TOPICAL at 11:19

## 2024-03-19 RX ADMIN — TRAZODONE HYDROCHLORIDE 100 MG: 100 TABLET ORAL at 22:55

## 2024-03-19 RX ADMIN — HEPARIN SODIUM 5000 UNITS: 5000 INJECTION INTRAVENOUS; SUBCUTANEOUS at 05:40

## 2024-03-19 RX ADMIN — HEPARIN SODIUM 5000 UNITS: 5000 INJECTION INTRAVENOUS; SUBCUTANEOUS at 17:56

## 2024-03-19 RX ADMIN — CLOPIDOGREL 75 MG: 75 TABLET ORAL at 09:04

## 2024-03-19 RX ADMIN — ATORVASTATIN CALCIUM 10 MG: 10 TABLET, FILM COATED ORAL at 09:04

## 2024-03-19 NOTE — ASSESSMENT & PLAN NOTE
88-year-old female patient with past medical history of dementia, primarily Indonesian speaking, hypertension, GERD, brought to emergency room by family member due to worsening generalized weakness, worsening cognitive function and difficult to continue to provide care for her at home.    CBC with mild leukocytosis.  CMP noted grossly within normal limits.  UA negative for UTI.  EKG noted with new onset A-fib with rate controlled.  CT head reported negative for expanding.    Unclear etiology however suspected progressively worsening underlying deconditioning and dementia.  Follow-up with TSH, vitamin D, B12, folic acid level.  Trend HS troponin.  Continue telemetry monitoring.  Follow-up with 2D echo.  Fall precautions.  Follow-up with PT OT eval.  Continue with supportive care.

## 2024-03-19 NOTE — ASSESSMENT & PLAN NOTE
88-year-old female patient with past medical history of dementia, primarily Georgian speaking who initially was brought in by family due to generalized weakness worse cognitive function  Lab work unremarkable  No signs of obvious infection  CT without any acute abnormalities  PT/OT eval for possible rehab  Follow-up with case management

## 2024-03-19 NOTE — ASSESSMENT & PLAN NOTE
Follow-up with wound care.   July 25, 2022      Tiara Smith  [id number] [facility] [address]        Dear ,    We are writing to inform you of your test results.    There was no blood in the stool sample as tested.     You will be contacted with any outstanding results as they are available.   Feel free to contact me via the office or My Chart if you have any questions regarding the above.       Resulted Orders   Fecal colorectal cancer screen FIT - Future (S+30)   Result Value Ref Range    Occult Blood Screen FIT Negative Negative       If you have any questions or concerns, please call the clinic at the number listed above.       Sincerely,      Jared Akhtar, DO

## 2024-03-19 NOTE — ASSESSMENT & PLAN NOTE
Patient was brought by family member due to progressively worsening generalized weakness, cognitive decline.  Noted to be in new onset atrial fibrillation  Currently rate controlled  Continue Lopressor 25 mg twice daily  Continue telemetry monitoring  Continue echo  Have a discussion with family regarding Eliquis given advanced age/dementia and increased bleeding risks follow-up for antiplatelet therapy

## 2024-03-19 NOTE — PLAN OF CARE
Problem: Potential for Falls  Goal: Patient will remain free of falls  Description: INTERVENTIONS:  - Educate patient/family on patient safety including physical limitations  - Instruct patient to call for assistance with activity   - Consult OT/PT to assist with strengthening/mobility   - Keep Call bell within reach  - Keep bed low and locked with side rails adjusted as appropriate  - Keep care items and personal belongings within reach  - Initiate and maintain comfort rounds  - Make Fall Risk Sign visible to staff  - Offer Toileting every  Hours, in advance of need  - Initiate/Maintain alarm  - Obtain necessary fall risk management equipment:   - Apply yellow socks and bracelet for high fall risk patients  - Consider moving patient to room near nurses station  Outcome: Progressing     Problem: PAIN - ADULT  Goal: Verbalizes/displays adequate comfort level or baseline comfort level  Description: Interventions:  - Encourage patient to monitor pain and request assistance  - Assess pain using appropriate pain scale  - Administer analgesics based on type and severity of pain and evaluate response  - Implement non-pharmacological measures as appropriate and evaluate response  - Consider cultural and social influences on pain and pain management  - Notify physician/advanced practitioner if interventions unsuccessful or patient reports new pain  Outcome: Progressing     Problem: INFECTION - ADULT  Goal: Absence or prevention of progression during hospitalization  Description: INTERVENTIONS:  - Assess and monitor for signs and symptoms of infection  - Monitor lab/diagnostic results  - Monitor all insertion sites, i.e. indwelling lines, tubes, and drains  - Monitor endotracheal if appropriate and nasal secretions for changes in amount and color  - [place] appropriate cooling/warming therapies per order  - Administer medications as ordered  - Instruct and encourage patient and family to use good hand hygiene technique  -  Identify and instruct in appropriate isolation precautions for identified infection/condition  Outcome: Progressing  Goal: Absence of fever/infection during neutropenic period  Description: INTERVENTIONS:  - Monitor WBC    Outcome: Progressing     Problem: SAFETY ADULT  Goal: Patient will remain free of falls  Description: INTERVENTIONS:  - Educate patient/family on patient safety including physical limitations  - Instruct patient to call for assistance with activity   - Consult OT/PT to assist with strengthening/mobility   - Keep Call bell within reach  - Keep bed low and locked with side rails adjusted as appropriate  - Keep care items and personal belongings within reach  - Initiate and maintain comfort rounds  - Make Fall Risk Sign visible to staff  - Offer Toileting every  Hours, in advance of need  - Initiate/Maintain alarm  - Obtain necessary fall risk management equipment:   - Apply yellow socks and bracelet for high fall risk patients  - Consider moving patient to room near nurses station  Outcome: Progressing  Goal: Maintain or return to baseline ADL function  Description: INTERVENTIONS:  -  Assess patient's ability to carry out ADLs; assess patient's baseline for ADL function and identify physical deficits which impact ability to perform ADLs (bathing, care of mouth/teeth, toileting, grooming, dressing, etc.)  - Assess/evaluate cause of self-care deficits   - Assess range of motion  - Assess patient's mobility; develop plan if impaired  - Assess patient's need for assistive devices and provide as appropriate  - Encourage maximum independence but intervene and supervise when necessary  - Involve family in performance of ADLs  - Assess for home care needs following discharge   - Consider OT consult to assist with ADL evaluation and planning for discharge  - Provide patient education as appropriate  Outcome: Progressing  Goal: Maintains/Returns to pre admission functional level  Description:  INTERVENTIONS:  - Perform AM-PAC 6 Click Basic Mobility/ Daily Activity assessment daily.  - Set and communicate daily mobility goal to care team and patient/family/caregiver.   - Collaborate with rehabilitation services on mobility goals if consulted  - Perform Range of Motion  times a day.  - Reposition patient every  hours.  - Dangle patient  times a day  - Stand patient  times a day  - Ambulate patient  times a day  - Out of bed to chair  times a day   - Out of bed for meals  times a day  - Out of bed for toileting  - Record patient progress and toleration of activity level   Outcome: Progressing     Problem: DISCHARGE PLANNING  Goal: Discharge to home or other facility with appropriate resources  Description: INTERVENTIONS:  - Identify barriers to discharge w/patient and caregiver  - Arrange for needed discharge resources and transportation as appropriate  - Identify discharge learning needs (meds, wound care, etc.)  - Arrange for interpretive services to assist at discharge as needed  - Refer to Case Management Department for coordinating discharge planning if the patient needs post-hospital services based on physician/advanced practitioner order or complex needs related to functional status, cognitive ability, or social support system  Outcome: Progressing     Problem: Knowledge Deficit  Goal: Patient/family/caregiver demonstrates understanding of disease process, treatment plan, medications, and discharge instructions  Description: Complete learning assessment and assess knowledge base.  Interventions:  - Provide teaching at level of understanding  - Provide teaching via preferred learning methods  Outcome: Progressing     Problem: Prexisting or High Potential for Compromised Skin Integrity  Goal: Skin integrity is maintained or improved  Description: INTERVENTIONS:  - Identify patients at risk for skin breakdown  - Assess and monitor skin integrity  - Assess and monitor nutrition and hydration status  -  Monitor labs   - Assess for incontinence   - Turn and reposition patient  - Assist with mobility/ambulation  - Relieve pressure over bony prominences  - Avoid friction and shearing  - Provide appropriate hygiene as needed including keeping skin clean and dry  - Evaluate need for skin moisturizer/barrier cream  - Collaborate with interdisciplinary team   - Patient/family teaching  - Consider wound care consult   Outcome: Progressing

## 2024-03-19 NOTE — H&P
[facility]  H&P  Name: Cira Iqbal 88 y.o. female I MRN: [id number]  Unit/Bed#: MS [facility]-01 I Date of Admission: 3/18/2024   Date of Service: 3/18/2024 I Hospital Day: 0      Assessment/Plan   * Generalized weakness  Assessment & Plan  88-year-old female patient with past medical history of dementia, primarily Togolese speaking, hypertension, GERD, brought to emergency room by family member due to worsening generalized weakness, worsening cognitive function and difficult to continue to provide care for her at home.    CBC with mild leukocytosis.  CMP noted grossly within normal limits.  UA negative for UTI.  EKG noted with new onset A-fib with rate controlled.  CT head reported negative for expanding.    Unclear etiology however suspected progressively worsening underlying deconditioning and dementia.  Follow-up with TSH, vitamin D, B12, folic acid level.  Trend HS troponin.  Continue telemetry monitoring.  Follow-up with 2D echo.  Fall precautions.  Follow-up with PT OT eval.  Continue with supportive care.      New onset a-fib (HCC)  Assessment & Plan  Patient was brought by family member due to progressively worsening generalized weakness, cognitive decline.  On initial evaluation emergency room noted with new onset A-fib rate controlled on EKG.  [id number] score 5    Currently hemodynamically stable.  Follow-up with 2D echo.  Start on lopressor 25mg bid.   Patient previously was on Plavix reason unclear per family.  Currently on aspirin 81 mg daily dose.   I Had lengthy discussion with patient's son Himanshu  and daughter-in-law Cheli over the phone and family opted to continue aspirin and Plavix for now given patient's advanced age, dementia, increased risk of bleeding with anticoagulation.               Sacral wound  Assessment & Plan  Follow-up with wound care.    CHF (congestive heart failure) (HCC)  Assessment & Plan  Wt Readings from Last 3 Encounters:   06/01/20 71.6 kg (157 lb 13.6 oz)    01/26/20 73.7 kg (162 lb 7.7 oz)   01/20/20 72.5 kg (159 lb 13.3 oz)       Present on admission history of CHF.  Unclear of type of CHF since no recent or previous echo report available to review.  Patient previously was on lisinopril, Lasix however she has been off of it since she moved from [place].  Follow-up with 2D echo.          Hypertension  Assessment & Plan  Present on admission history of hypertension.  Patient was noted elevated on presentation currently slightly better.  Patient was previously on Lasix and lisinopril however currently she has been off of those meds since she moved from [place] to here with family.  Started on Lopressor 25 mg twice daily for now.         VTE Prophylaxis: Heparin     Code Status: level 3 DNR/DNI  Discussion with family: Lengthy discussion with son Himanshu and daughter-in-law Cheli over the phone.    Anticipated Length of Stay:  Patient will be admitted on an Observation basis with an anticipated length of stay of  < 2 midnights.   Justification for Hospital Stay: generalized weakness, new onset A.fib.     Total Time for Visit, including Counseling / Coordination of Care: 90 minutes.  Greater than 50% of this total time spent on direct patient counseling and coordination of care.    Chief Complaint: Generalized weakness    History of Present Illness:    Cira Iqbal is a 88 y.o. female patient primarily Citizen of [place] speaking with past medical history of dementia, hypertension, GERD, patient is poor historian, history obtained from reviewing the chart and family members.  Patient presents with generalized weakness, family reports significant functional and cognitive decline in the past month including unwillingness to get out of bed which is atypical for the patient.  As per family patient has been complaining of right-sided hip pain which is new this week.  Family denies fever, any focal symptoms reported by patient.  Increasingly difficult to continue to provide care for her  at home as per family.  Patient reports having chronically more edematous left lower extremity compared to right lower extremity and abdomen lower extremity venous duplex in the past negative for DVT.  Family also complaining of having sacral pressure wound since she was not moving out of bed.  No other events reported.    Review of Systems:    Review of Systems   Unable to perform ROS: Dementia       Past Medical and Surgical History:     Past Medical History:   Diagnosis Date    CHF (congestive heart failure) (HCC)     Hyperlipidemia     Hypertension     Knee pain     right       Past Surgical History:   Procedure Laterality Date    BACK SURGERY      FL GUIDED NEEDLE PLAC BX/ASP/INJ  3/7/2019    KNEE SURGERY         Meds/Allergies:    Prior to Admission medications    Medication Sig Start Date End Date Taking? Authorizing Provider   aspirin 81 mg chewable tablet Chew 81 mg 12/4/19  Yes Historical Provider, MD   atorvastatin (LIPITOR) 10 mg tablet Take 10 mg by mouth daily   Yes Historical Provider, MD   Melatonin 12 MG TABS Take 12 mg by mouth daily at bedtime   Yes Historical Provider, MD   traZODone (DESYREL) 100 mg tablet Take 100 mg by mouth daily at bedtime   Yes Historical Provider, MD   clopidogrel (PLAVIX) 75 mg tablet Take 75 mg by mouth daily  3/18/24  Historical Provider, MD   Diclofenac Sodium (VOLTAREN) 1 % Apply 2 g topically daily as needed (knee pain) 5/17/21 3/18/24  Ld Fleming PA-C   furosemide (LASIX) 20 mg tablet Take 20 mg by mouth daily  3/18/24  Historical Provider, MD   lisinopril (ZESTRIL) 20 mg tablet Take 20 mg by mouth daily  3/18/24  Historical Provider, MD   omeprazole (PriLOSEC) 40 MG capsule Take 40 mg by mouth daily  3/18/24  Historical Provider, MD   tolterodine (DETROL LA) 4 mg 24 hr capsule Take 4 mg by mouth daily  3/18/24  Historical Provider, MD     I have reviewed home medications with a medical source (PCP, Pharmacy, other).    Allergies: No Known  Allergies    Social History:     Marital Status:      Substance Use History:   Social History     Substance and Sexual Activity   Alcohol Use Never     Social History     Tobacco Use   Smoking Status Never   Smokeless Tobacco Never     Social History     Substance and Sexual Activity   Drug Use Never       Family History:    History reviewed. No pertinent family history.    Physical Exam:     Vitals:   Blood Pressure: 167/97 (03/18/24 2001)  Pulse: (!) 111 (03/18/24 1630)  Temperature: 98.1 °F (36.7 °C) (03/18/24 2001)  Temp Source: Oral (03/18/24 1509)  Respirations: 18 (03/18/24 2001)  Height: (P) 5' (152.4 cm) (03/18/24 2048)  SpO2: 98 % (03/18/24 1630)    Physical Exam  Constitutional:       General: She is not in acute distress.     Appearance: Normal appearance. She is not ill-appearing, toxic-appearing or diaphoretic.      Comments: Elderly, deconditioned female patient, acutely nontoxic appearing.   HENT:      Head: Normocephalic and atraumatic.   Eyes:      Pupils: Pupils are equal, round, and reactive to light.   Cardiovascular:      Rate and Rhythm: Tachycardia present. Rhythm irregular.      Pulses: Normal pulses.   Pulmonary:      Effort: Pulmonary effort is normal. No respiratory distress.      Breath sounds: No wheezing.   Abdominal:      General: Bowel sounds are normal.      Tenderness: There is no abdominal tenderness.   Neurological:      Mental Status: She is alert.      Comments: She is awake, alert, disoriented, cooperative during exam.  Poor insight.   Psychiatric:      Comments: Patient noted anxious, noted with fidgeting with her clothes.             Additional Data:     Lab Results: I have personally reviewed pertinent reports.      Results from last 7 days   Lab Units 03/18/24  1546   WBC Thousand/uL 11.86*   HEMOGLOBIN g/dL 13.8   HEMATOCRIT % 42.5   PLATELETS Thousands/uL 301   NEUTROS PCT % 74   LYMPHS PCT % 18   MONOS PCT % 7   EOS PCT % 0     Results from last 7 days   Lab  Units 03/18/24  1546   SODIUM mmol/L 139   POTASSIUM mmol/L 4.3   CHLORIDE mmol/L 104   CO2 mmol/L 26   BUN mg/dL 18   CREATININE mg/dL 1.00   ANION GAP mmol/L 9   CALCIUM mg/dL 9.5   ALBUMIN g/dL 3.9   TOTAL BILIRUBIN mg/dL 0.83   ALK PHOS U/L 107*   ALT U/L 10   AST U/L 21   GLUCOSE RANDOM mg/dL 93     Results from last 7 days   Lab Units 03/18/24  1546   INR  1.06             Results from last 7 days   Lab Units 03/18/24  1546   LACTIC ACID mmol/L 1.2       Imaging: I have personally reviewed pertinent reports.      CT head without contrast   Final Result by Jae Jimenez MD (03/18 1655)      No acute intracranial abnormality. Stable findings as above.                  Workstation performed: [id number]         XR hips bilateral 2 vw w pelvis if performed    (Results Pending)   XR chest portable    (Results Pending)       EKG, Pathology, and Other Studies Reviewed on Admission:   EKG: A-fib with RVR        ** Please Note: This note has been constructed using a voice recognition system. **

## 2024-03-19 NOTE — ASSESSMENT & PLAN NOTE
Wt Readings from Last 3 Encounters:   06/01/20 71.6 kg (157 lb 13.6 oz)   01/26/20 73.7 kg (162 lb 7.7 oz)   01/20/20 72.5 kg (159 lb 13.3 oz)       Present on admission history of CHF.  Unclear of type of CHF since no recent or previous echo report available to review.  Patient previously was on lisinopril, Lasix however she has been off of it since she moved from [place].  Follow-up with 2D echo.

## 2024-03-19 NOTE — CASE MANAGEMENT
Case Management Assessment & Discharge Planning Note    Patient name Cira Iqbal  Location /-01 MRN [id number]  : 1935 Date 3/19/2024       Current Admission Date: 3/18/2024  Current Admission Diagnosis:Generalized weakness   Patient Active Problem List    Diagnosis Date Noted    Generalized weakness 2024    New onset a-fib (HCC) 2024    Sacral wound 2024    Cellulitis of right hand 2020    Cellulitis of left wrist 2020    Hypertension 2020    GERD (gastroesophageal reflux disease) 2020    CHF (congestive heart failure) (HCC) 2017      LOS (days): 0  Geometric Mean LOS (GMLOS) (days): 1.8  Days to GMLOS:1.8     OBJECTIVE:      Current admission status: Inpatient     Preferred Pharmacy:   [facility] [place] - [address]  [address] [address]  Phone: [phone] Fax: [phone]    Primary Care Provider: Nely Bustillos MD  Primary Insurance: [facility] REP  Secondary Insurance: [employer] AND [employer] [facility]    ASSESSMENT:  Active Health Care Proxies    There are no active Health Care Proxies on file.       Readmission Root Cause  30 Day Readmission: No    Patient Information  Admitted from:: Home  Mental Status: Alert  During Assessment patient was accompanied by: Son, Daughter  Assessment information provided by:: Son, Daughter  Primary Caregiver: Family  Caregiver's Name:: Himanshu Iqbal (Son)  Caregiver's Relationship to Patient:: Family Member  Caregiver's Telephone Number:: [phone]  Support Systems: Son, Daughter ([facility])  County of Residence: [place]  What city do you live in?: [place]  Type of Current Residence: 2 story home  Upon entering residence, is there a bedroom on the main floor (no further steps)?: Yes  Upon entering residence, is there a bathroom on the main floor (no further steps)?: Yes  Living Arrangements: Lives w/ Son, Lives w/ Daughter  Is  patient a ?: No    Activities of Daily Living Prior to Admission  Functional Status: Independent  Completes ADLs independently?: No  Level of ADL dependence: Assistance  Ambulates independently?: Yes  Does patient use assisted devices?: Yes  Assisted Devices (DME) used: Walker, Straight Cane  Does patient currently own DME?: Yes  What DME does the patient currently own?: Straight Cane, Walker, Wheelchair     Patient Information Continued  Does patient have prescription coverage?: Yes  Does patient receive dialysis treatments?: No  Does patient have a history of substance abuse?: No  Does patient have a history of Mental Health Diagnosis?: No    PHQ 2/9 Screening   Reviewed PHQ 2/9 Depression Screening Score?: No    Means of Transportation  Means of Transport to Appts:: Family transport    Social Determinants of Health (SDOH)      Flowsheet Row Most Recent Value   Housing Stability    In the last 12 months, was there a time when you were not able to pay the mortgage or rent on time? N   In the last 12 months, how many places have you lived? 1   In the last 12 months, was there a time when you did not have a steady place to sleep or slept in a shelter (including now)? N   Transportation Needs    In the past 12 months, has lack of transportation kept you from medical appointments or from getting medications? no   In the past 12 months, has lack of transportation kept you from meetings, work, or from getting things needed for daily living? No   Food Insecurity    Within the past 12 months, you worried that your food would run out before you got the money to buy more. Never true   Within the past 12 months, the food you bought just didn't last and you didn't have money to get more. Never true   Utilities    In the past 12 months has the electric, gas, oil, or water company threatened to shut off services in your home? No          DISCHARGE DETAILS:    Discharge planning discussed with:: Son and DIL at  bedside.  Freedom of Choice: Yes  Comments - Freedom of Choice: FOC maintained - CM introduced self and role.  Family would like LTC placement for patient up d/c.  Preference is [facility].  CM explained blanket referral process, family agreeable.  CM contacted family/caregiver?: Yes  Were Treatment Team discharge recommendations reviewed with patient/caregiver?: Yes (As it pertains to d/c planning and CM role to this point.)  Did patient/caregiver verbalize understanding of patient care needs?: N/A- going to facility  Were patient/caregiver advised of the risks associated with not following Treatment Team discharge recommendations?: Yes (As it pertains to d/c planning and CM role to this point.)    Contacts  Patient Contacts: Son & DIL  Relationship to Patient:: Family  Contact Method: In Person  Reason/Outcome: Continuity of Care, Discharge Planning, Referral    Requested Home Health Care         Is the patient interested in HHC at discharge?: No    DME Referral Provided  Referral made for DME?: No    Other Referral/Resources/Interventions Provided:  Interventions: Short Term Rehab, SNF, Other (Specify) (pastoral care)  Referral Comments: [place] referral sent in [facility].  [facility] is preference.  CM contacted pastoral care via TT to request family visit for support.  Programs:: CHF    Would you like to participate in our [facility] service program?  : No - Declined    Treatment Team Recommendation: SNF  Discharge Destination Plan:: SNF

## 2024-03-19 NOTE — ASSESSMENT & PLAN NOTE
Patient was brought by family member due to progressively worsening generalized weakness, cognitive decline.  On initial evaluation emergency room noted with new onset A-fib rate controlled on EKG.  [id number] score 5    Currently hemodynamically stable.  Follow-up with 2D echo.  Start on lopressor 25mg bid.   Patient previously was on Plavix reason unclear per family.  Currently on aspirin 81 mg daily dose.   I Had lengthy discussion with patient's son Himanshu  and daughter-in-law Cheli over the phone and family opted to continue aspirin and Plavix for now given patient's advanced age, dementia, increased risk of bleeding with anticoagulation.

## 2024-03-19 NOTE — DISCHARGE INSTR - OTHER ORDERS
Skin care plans:  1-Hydraguard to bilateral sacrum, buttock and heels 2 times a day and as needed  2-Elevate heels to offload pressure.  3-Waffle cushion in chair when out of bed.  4-Moisturize skin daily with skin nourishing cream.  5-Turn/reposition every 2 hours for pressure re-distribution on skin.  6-Right lateral proximal thigh- Cleanse wound with soap and water, pat dry. Apply Silicone foam dressing over wound bed. Change every 3 days and as needed for soilage/displacement. Peel back and check skin integrity daily.

## 2024-03-19 NOTE — WOUND OSTOMY CARE
Progress Note - Wound   Cira Iqbal 88 y.o. female MRN: [id number]  Unit/Bed#: -01 Encounter: [id number]      Assessment:   Patient admitted to [facility] due to generalized weakness. History of CHF, HLD, HTN. Wound care consulted for sacral wound. Patient agreeable to assessment, alert and oriented x2, use of  as patient is speaks mostly Peruvian, is assist of 1 to turn for assessment, is an assist with care, wedges in use for turning and repositioning, is incontinent of bowel and bladder.    1. Bilateral sacrum, buttock, and heels- skin is dry, intact, blanchable pink and blanchable red skin.    2. Pressure injury, Right lateral proximal thigh, evolving DTI-POA- Wound is oval/irregular in shape, approx. 10% non-blanchable purple intact skin, 10% pale yellow tissue within wound bed, and 80% non-blanchable pink partial thickness tissue, with no drainage noted. Padmini-wound is dry, intact, blanchable pink and red skin. This wound has the potential to evolve to a full thickness injury, stage 3 or 4.    3. Bilateral elbows and left hip- skin is dry, intact, blanchable pink skin.    Educated patient on importance of frequent offloading of pressure via turning, repositioning, and weight-shifting.    No induration, fluctuance, odor, warmth, redness, or purulence noted to the above noted wound. New dressing applied. Patient tolerated well, reports mild pain to the wound. Primary nurse aware of plan of care. See flow sheets for more detailed assessment findings. Will follow along.        Skin care plans:  1-Hydraguard to bilateral sacrum, buttock and heels BID and PRN  2-Elevate heels to offload pressure.  3-Ehob cushion in chair when out of bed.  4-Moisturize skin daily with skin nourishing cream.  5-Turn/reposition q2h for pressure re-distribution on skin.  6-Right lateral proximal thigh- Cleanse wound with soap and water, pat dry. Apply Silicone foam dressing over wound bed. Change every 3 days and as needed for  soilage/displacement. Peel back and check skin integrity daily.       Wound 03/19/24 Pressure Injury Thigh Right;Lateral;Proximal (Active)   Wound Image   03/19/24 0931   Wound Description Pink;Light purple;Yellow 03/19/24 0931   Pressure Injury Stage DTPI 03/19/24 0931   Padmini-wound Assessment Dry;Intact;Pink 03/19/24 0931   Wound Length (cm) 2 cm 03/19/24 0931   Wound Width (cm) 3 cm 03/19/24 0931   Wound Depth (cm) 0.1 cm 03/19/24 0931   Wound Surface Area (cm^2) 6 cm^2 03/19/24 0931   Wound Volume (cm^3) 0.6 cm^3 03/19/24 0931   Calculated Wound Volume (cm^3) 0.6 cm^3 03/19/24 0931   Drainage Amount None 03/19/24 0931   Non-staged Wound Description Partial thickness 03/19/24 0931   Treatments Cleansed;Site care 03/19/24 0931   Dressing Foam, Silicon (eg. Allevyn, etc) 03/19/24 0931   Wound packed? No 03/19/24 0931   Dressing Changed New 03/19/24 0931   Patient Tolerance Tolerated well 03/19/24 0931   Dressing Status Clean;Dry;Intact 03/19/24 0931       Call or tigertext with any questions  Wound Care will continue to follow    Ramila HARDINGN RN CWON  Wound and Ostomy care                         normal (ped)...

## 2024-03-19 NOTE — ASSESSMENT & PLAN NOTE
Present on admission history of hypertension.  Patient was noted elevated on presentation currently slightly better.  Patient was previously on Lasix and lisinopril however currently she has been off of those meds since she moved from [place] to here with family.  Started on Lopressor 25 mg twice daily for now.

## 2024-03-19 NOTE — PROGRESS NOTES
[facility]  Progress Note  Name: Cira Iqbal I  MRN: [id number]  Unit/Bed#: -01 I Date of Admission: 3/18/2024   Date of Service: 3/19/2024 I Hospital Day: 0    Assessment/Plan   * Generalized weakness  Assessment & Plan  88-year-old female patient with past medical history of dementia, primarily Maori speaking who initially was brought in by family due to generalized weakness worse cognitive function  Lab work unremarkable  No signs of obvious infection  CT without any acute abnormalities  PT/OT eval for possible rehab  Follow-up with case management    Sacral wound  Assessment & Plan  Continue with local wound care    New onset a-fib (HCC)  Assessment & Plan  Patient was brought by family member due to progressively worsening generalized weakness, cognitive decline.  Noted to be in new onset atrial fibrillation  Currently rate controlled  Continue Lopressor 25 mg twice daily  Continue telemetry monitoring  Continue echo  Have a discussion with family regarding Eliquis given advanced age/dementia and increased bleeding risks follow-up for antiplatelet therapy        CHF (congestive heart failure) (HCC)  Assessment & Plan  Reports a history of CHF  Moved from [place]  Follow-up echo  Currently appears euvolemic          Hypertension  Assessment & Plan  BP currently controlled  Continue metoprolol           VTE Pharmacologic Prophylaxis:   Pharmacologic: Heparin  Mechanical VTE Prophylaxis in Place: Yes    Patient Centered Rounds: I have performed bedside rounds with nursing staff today.    Discussions with Specialists or Other Care Team Provider: cm, nursing    Education and Discussions with Family / Patient: pt, son    Time Spent for Care: 30 minutes.  More than 50% of total time spent on counseling and coordination of care as described above.    Current Length of Stay: 0 day(s)    Current Patient Status: Observation   Certification Statement: The patient will continue to require  additional inpatient hospital stay due to see below    Discharge Plan: Anticipate medical clearance 24 hours.  Awaiting PT/OT eval for possible rehab    Code Status: Level 3 - DNAR and DNI      Subjective:   Currently denies any acute complaints.  Denies chest pain, shortness of breath, cough, fevers    Objective:     Vitals:   Temp (24hrs), Av.2 °F (36.8 °C), Min:97.8 °F (36.6 °C), Max:99.2 °F (37.3 °C)    Temp:  [97.8 °F (36.6 °C)-99.2 °F (37.3 °C)] 99.2 °F (37.3 °C)  HR:  [] 88  Resp:  [16-18] 16  BP: (118-196)/(53-97) 118/53  SpO2:  [95 %-98 %] 96 %  Body mass index is 30.66 kg/m².     Input and Output Summary (last 24 hours):       Intake/Output Summary (Last 24 hours) at 3/19/2024 1102  Last data filed at 3/18/2024 1654  Gross per 24 hour   Intake 1000 ml   Output --   Net 1000 ml       Physical Exam:     Physical Exam  Constitutional:       General: She is not in acute distress.     Appearance: She is well-developed. She is not diaphoretic.   HENT:      Head: Normocephalic and atraumatic.      Nose: Nose normal.      Mouth/Throat:      Pharynx: No oropharyngeal exudate.   Eyes:      General: No scleral icterus.        Right eye: No discharge.         Left eye: No discharge.      Conjunctiva/sclera: Conjunctivae normal.   Neck:      Thyroid: No thyromegaly.      Vascular: No JVD.   Cardiovascular:      Rate and Rhythm: Normal rate and regular rhythm.      Heart sounds: Normal heart sounds. No murmur heard.     No friction rub. No gallop.   Pulmonary:      Effort: Pulmonary effort is normal. No respiratory distress.      Breath sounds: Normal breath sounds. No wheezing or rales.   Chest:      Chest wall: No tenderness.   Abdominal:      General: Bowel sounds are normal. There is no distension.      Palpations: Abdomen is soft.      Tenderness: There is no abdominal tenderness. There is no guarding or rebound.   Musculoskeletal:         General: No tenderness or deformity. Normal range of motion.       Cervical back: Normal range of motion and neck supple.   Skin:     General: Skin is warm and dry.      Findings: No erythema or rash.   Neurological:      Mental Status: She is alert. Mental status is at baseline.      Cranial Nerves: No cranial nerve deficit.      Sensory: No sensory deficit.      Motor: No abnormal muscle tone.      Coordination: Coordination normal.           Additional Data:     Labs:    Results from last 7 days   Lab Units 03/19/24  0311 03/18/24  1546   WBC Thousand/uL 11.92* 11.86*   HEMOGLOBIN g/dL 12.7 13.8   HEMATOCRIT % 38.1 42.5   PLATELETS Thousands/uL 276 301   NEUTROS PCT %  --  74   LYMPHS PCT %  --  18   MONOS PCT %  --  7   EOS PCT %  --  0     Results from last 7 days   Lab Units 03/19/24  0311 03/18/24  1546   SODIUM mmol/L 137 139   POTASSIUM mmol/L 3.5 4.3   CHLORIDE mmol/L 103 104   CO2 mmol/L 23 26   BUN mg/dL 17 18   CREATININE mg/dL 0.90 1.00   ANION GAP mmol/L 11 9   CALCIUM mg/dL 8.8 9.5   ALBUMIN g/dL  --  3.9   TOTAL BILIRUBIN mg/dL  --  0.83   ALK PHOS U/L  --  107*   ALT U/L  --  10   AST U/L  --  21   GLUCOSE RANDOM mg/dL 103 93     Results from last 7 days   Lab Units 03/18/24  1546   INR  1.06             Results from last 7 days   Lab Units 03/18/24  1546   LACTIC ACID mmol/L 1.2           * I Have Reviewed All Lab Data Listed Above.  * Additional Pertinent Lab Tests Reviewed: All Labs Within Last 24 Hours Reviewed    Imaging:    Imaging Reports Reviewed Today Include: na  Imaging Personally Reviewed by Myself Includes:  na    Recent Cultures (last 7 days):     Results from last 7 days   Lab Units 03/18/24  1552 03/18/24  1546   BLOOD CULTURE  Received in Microbiology Lab. Culture in Progress. Received in Microbiology Lab. Culture in Progress.       Last 24 Hours Medication List:   Current Facility-Administered Medications   Medication Dose Route Frequency Provider Last Rate    aspirin  81 mg Oral Daily Boone RODRIGUEZ MD      atorvastatin  10 mg Oral Daily Boone  Heavenly RODRIGUEZ MD      clopidogrel  75 mg Oral Daily Boone RODRIGUEZ MD      heparin (porcine)  5,000 Units Subcutaneous Q8H GERMÁN Boone RODRIGUEZ MD      melatonin  12 mg Oral HS Boone RODRIGUEZ MD      metoprolol tartrate  25 mg Oral Q12H [facility] Boone RDORIGUEZ MD      oxyCODONE  2.5 mg Oral Q8H PRN Peter Melgoza MD      traZODone  100 mg Oral HS Boone RODRIGUEZ MD          Today, Patient Was Seen By: Peter Melgoza MD    ** Please Note: Dictation voice to text software may have been used in the creation of this document. **

## 2024-03-19 NOTE — UTILIZATION REVIEW
"Initial Clinical Review  OBSERVATION  3/18/24 @ 1748  CONVERTED TO INPATIENT ADMISSION 3/19/24 @ 1435  DUE TO CONTINUED STAY REQUIRED TO EVALUATE AND TREAT PATIENT WITH GENERALIZED WEAKNESS, NEW ONSET A FIB WITH TELEMETRY, F/U ECHO. PT/OT. XR  HIP BILAT.    Admission: Date/Time/Statement:   Admission Orders (From admission, onward)       Ordered        03/19/24 1435  Inpatient Admission  Once            03/18/24 1748  Place in Observation  Once                          Orders Placed This Encounter   Procedures    Inpatient Admission     Standing Status:   Standing     Number of Occurrences:   1     Order Specific Question:   Level of Care     Answer:   Med Surg [16]     Order Specific Question:   Estimated length of stay     Answer:   More than 2 Midnights     Order Specific Question:   Certification     Answer:   I certify that inpatient services are medically necessary for this patient for a duration of greater than two midnights. See H&P and MD Progress Notes for additional information about the patient's course of treatment.     ED Arrival Information       Expected   -    Arrival   3/18/2024 15:01    Acuity   Emergent              Means of arrival   Ambulance    Escorted by   [facility]   Hospitalist    Admission type   Emergency              Arrival complaint   Failure to Thrive             Chief Complaint   Patient presents with    Weakness - Generalized     Per family pt seems \"pff, pt has dementia, per family pt has a bedsore and state that pt is c/o pain and does not want to move around much, they are concern that something else may be going on, pt is primarily South Korean speaking       Initial Presentation: 88 y.o. female with hx dementia, HTN, GERD , CHFwho presents to ED via EMS from home  with  generalized weakness, family reports significant functional and cognitive decline in the past month including unwillingness to get out of bed which is atypical for the patient. Also reports " patient has been complaining of right-sided hip pain which is new this week . Increasingly difficult to continue to provide care for her at home..  Family also complaining of having sacral pressure wound since she was not moving out of bed.    Chronically more edematous left lower extremity compared to right lower extremity and abdomen lower extremity venous duplex in the past negative for DVT. On exam, tachycardic,irreg rhythm elevated BP, pt awake, alert, disoriented, cooperative . Pt anxious, fidgeting w/ her clothes. [id number] score 5 .  2-3cm open wound over R hip. No drainage. No crepitus or fluctuance. No surrounding cellulitis.    Labs WBC 11.86. ECG- A fib . CT head shows no acute findings. Pt given IVF in ED. Pt admitted as OBS to telemetry  with generalized weakness, new onset A fib, HTN, sacral wound . PLan - telemetry , echo, start Lopressor 25 mg BID. Continue home ASA 81 mg daily . Trend troponin . Fall monitoring. PT/OT . Follow-up with TSH, vitamin D, B12, folic acid level. Wound care consult. F/U XR hips and CXR .      Date: 3/19 Converted to IP    Mental status at baseline .Oriented to person .  Pt awaiting PT/OT for possible rehab . New onset A fib, rate controlled. - continue telemetry. F/U echo . Continue Lopressor. On Plavix, ASA,  SQ heparin . Discuss with family regarding Eliquis given advanced age/dementia and increased bleeding risks follow-up for antiplatelet therapy . F/U XR bilat hips.     ED Triage Vitals   Temperature Pulse Respirations Blood Pressure SpO2   03/18/24 1509 03/18/24 1508 03/18/24 1508 03/18/24 1508 03/18/24 1508   97.8 °F (36.6 °C) 97 16 (!) 196/87 97 %      Temp Source Heart Rate Source Patient Position - Orthostatic VS BP Location FiO2 (%)   03/18/24 1509 03/18/24 1508 03/18/24 1508 03/18/24 1508 --   Oral Monitor Lying Right arm       Pain Score       03/18/24 2018       No Pain          Wt Readings from Last 1 Encounters:   03/19/24 71.2 kg (157 lb)     Additional  Vital Signs:   Date/Time Temp Pulse Resp BP MAP (mmHg) SpO2   03/19/24 07:42:49 98.1 °F (36.7 °C) 97 -- 133/65 88 95 %   03/19/24 0700 98.1 °F (36.7 °C) 97 16 133/65 88 --   03/19/24 02:37:23 -- 96 16 137/65 89 95 %   03/18/24 2216 98.1 °F (36.7 °C) 111 Abnormal  18 167/97 -- --   03/18/24 20:01:43 98.1 °F (36.7 °C) -- 18 167/97 120 --   03/18/24 1630 -- 111 Abnormal  16 187/72 Abnormal  104 98 %     Pertinent Labs/Diagnostic Test Results:    3/18 ECG- Rate:     ECG rate:  94     ECG rate assessment: normal    Rhythm:     Rhythm: atrial fibrillation    Comments:      Afib has replaced SR   3/19 echo- TTE    Left Ventricle: Left ventricular cavity size is normal. Wall thickness is normal. The left ventricular ejection fraction is 55%. Systolic function is normal. Wall motion is normal. Diastolic function is normal.    Left Atrium: The atrium is moderately dilated.    Aortic Valve: The aortic valve is trileaflet. The leaflets are moderately thickened. The leaflets are not calcified. The leaflets exhibit normal mobility. There is mild regurgitation. There is mild stenosis. The aortic valve peak gradient is 12 mmHg. The aortic valve mean gradient is 6 mmHg.    Mitral Valve: There is mild regurgitation.    Tricuspid Valve: There is moderate to severe regurgitation.      XR chest portable   Final Result by Hamzah Jimenez MD (03/19 1313)      No acute cardiopulmonary disease.            Workstation performed: [id number]         CT head without contrast   Final Result by Jae Jimenez MD (03/18 1655)      No acute intracranial abnormality. Stable findings as above.                  Workstation performed: [id number]         XR hips bilateral 2 vw w pelvis if performed    (Results Pending)     Results from last 7 days   Lab Units 03/18/24  1552   SARS-COV-2  Negative     Results from last 7 days   Lab Units 03/19/24  0311 03/18/24  1546   WBC Thousand/uL 11.92* 11.86*   HEMOGLOBIN g/dL 12.7 13.8   HEMATOCRIT % 38.1  42.5   PLATELETS Thousands/uL 276 301   NEUTROS ABS Thousands/µL  --  8.81*         Results from last 7 days   Lab Units 03/19/24  0311 03/18/24  1546   SODIUM mmol/L 137 139   POTASSIUM mmol/L 3.5 4.3   CHLORIDE mmol/L 103 104   CO2 mmol/L 23 26   ANION GAP mmol/L 11 9   BUN mg/dL 17 18   CREATININE mg/dL 0.90 1.00   EGFR ml/min/1.73sq m 57 50   CALCIUM mg/dL 8.8 9.5     Results from last 7 days   Lab Units 03/18/24  1546   AST U/L 21   ALT U/L 10   ALK PHOS U/L 107*   TOTAL PROTEIN g/dL 7.7   ALBUMIN g/dL 3.9   TOTAL BILIRUBIN mg/dL 0.83   BILIRUBIN DIRECT mg/dL 0.10         Results from last 7 days   Lab Units 03/19/24  0311 03/18/24  1546   GLUCOSE RANDOM mg/dL 103 93               Results from last 7 days   Lab Units 03/19/24  0311 03/19/24  0037 03/18/24  2244 03/18/24  1546   HS TNI 0HR ng/L  --   --  31 14   HS TNI 2HR ng/L  --  32  --   --    HSTNI D2 ng/L  --  1  --   --    HS TNI 4HR ng/L 33  --   --   --    HSTNI D4 ng/L 2  --   --   --          Results from last 7 days   Lab Units 03/18/24  1546   PROTIME seconds 14.4   INR  1.06   PTT seconds 27     Results from last 7 days   Lab Units 03/19/24  0311   TSH 3RD GENERATON uIU/mL 1.799         Results from last 7 days   Lab Units 03/18/24  1546   LACTIC ACID mmol/L 1.2       Results from last 7 days   Lab Units 03/18/24  1710   CLARITY UA  Clear   COLOR UA  Light Yellow   SPEC GRAV UA  1.015   PH UA  5.5   GLUCOSE UA mg/dl Negative   KETONES UA mg/dl Negative   BLOOD UA  Small*   PROTEIN UA mg/dl Negative   NITRITE UA  Negative   BILIRUBIN UA  Negative   UROBILINOGEN UA (BE) mg/dl <2.0   LEUKOCYTES UA  Moderate*   WBC UA /hpf 4-10*   RBC UA /hpf 10-20*   BACTERIA UA /hpf Occasional   EPITHELIAL CELLS WET PREP /hpf Occasional     Results from last 7 days   Lab Units 03/18/24  1552   INFLUENZA A PCR  Negative   INFLUENZA B PCR  Negative   RSV PCR  Negative           Results from last 7 days   Lab Units 03/18/24  1552 03/18/24  1546   BLOOD CULTURE  Received  in Microbiology Lab. Culture in Progress. Received in Microbiology Lab. Culture in Progress.         ED Treatment:   Medication Administration from 03/18/2024 1501 to 03/18/2024 1952         Date/Time Order Dose Route Action     03/18/2024 1654 EDT sodium chloride 0.9 % bolus 1,000 mL 0 mL Intravenous Stopped     03/18/2024 1554 EDT sodium chloride 0.9 % bolus 1,000 mL 1,000 mL Intravenous New Bag          Past Medical History:   Diagnosis Date    CHF (congestive heart failure) ([facility])     Hyperlipidemia     Hypertension     Knee pain     right     Present on Admission:   Hypertension   CHF (congestive heart failure) ([facility])      Admitting Diagnosis: Weakness generalized [R53.1]  Decubitus ulcer [L89.90]  Physical deconditioning [R53.81]  New onset a-fib ([facility]) [I48.91]  Ambulatory dysfunction [R26.2]  Age/Sex: 88 y.o. female  Admission Orders:  Scheduled Medications:  aspirin, 81 mg, Oral, Daily  atorvastatin, 10 mg, Oral, Daily  clopidogrel, 75 mg, Oral, Daily  heparin (porcine), 5,000 Units, Subcutaneous, Q8H GERMÁN  lidocaine, 1 patch, Topical, Daily  melatonin, 12 mg, Oral, HS  metoprolol tartrate, 25 mg, Oral, Q12H GERMÁN  traZODone, 100 mg, Oral, HS      Continuous IV Infusions:     PRN Meds:  oxyCODONE, 2.5 mg, Oral, Q8H PRN      Telemetry    OOB as tiol w/ assist   Reg diet    Network Utilization Review Department  ATTENTION: Please call with any questions or concerns to [phone] and carefully listen to the prompts so that you are directed to the right person. All voicemails are confidential.   For Discharge needs, contact Care Management DC Support Team at [phone] opt. 2  Send all requests for admission clinical reviews, approved or denied determinations and any other requests to dedicated fax number below belonging to the campus where the patient is receiving treatment. List of dedicated fax numbers for the Facilities:  FACILITY NAME UR FAX NUMBER   ADMISSION DENIALS (Administrative/Medical Necessity)  [phone]   DISCHARGE SUPPORT TEAM (NETWORK) [phone]   PARENT CHILD HEALTH (Maternity/NICU/Pediatrics) [phone]   [facility] [phone]   [facility] [phone]   [facility] [phone]   [facility] [phone]   [facility] [phone]   [facility] [phone]   [facility] [phone]   [facility] [phone]   [facility] [phone]   [facility] [phone]   [facility] [phone]   [facility] [phone]

## 2024-03-20 LAB
ANION GAP SERPL CALCULATED.3IONS-SCNC: 8 MMOL/L (ref 4–13)
ATRIAL RATE: 227 BPM
BASOPHILS # BLD AUTO: 0.02 THOUSANDS/ÂΜL (ref 0–0.1)
BASOPHILS NFR BLD AUTO: 0 % (ref 0–1)
BUN SERPL-MCNC: 21 MG/DL (ref 5–25)
CALCIUM SERPL-MCNC: 9.4 MG/DL (ref 8.4–10.2)
CHLORIDE SERPL-SCNC: 103 MMOL/L (ref 96–108)
CO2 SERPL-SCNC: 27 MMOL/L (ref 21–32)
CREAT SERPL-MCNC: 0.88 MG/DL (ref 0.6–1.3)
EOSINOPHIL # BLD AUTO: 0.01 THOUSAND/ÂΜL (ref 0–0.61)
EOSINOPHIL NFR BLD AUTO: 0 % (ref 0–6)
ERYTHROCYTE [DISTWIDTH] IN BLOOD BY AUTOMATED COUNT: 13.2 % (ref 11.6–15.1)
GFR SERPL CREATININE-BSD FRML MDRD: 59 ML/MIN/1.73SQ M
GLUCOSE SERPL-MCNC: 110 MG/DL (ref 65–140)
HCT VFR BLD AUTO: 39.6 % (ref 34.8–46.1)
HGB BLD-MCNC: 12.8 G/DL (ref 11.5–15.4)
IMM GRANULOCYTES # BLD AUTO: 0.07 THOUSAND/UL (ref 0–0.2)
IMM GRANULOCYTES NFR BLD AUTO: 1 % (ref 0–2)
LYMPHOCYTES # BLD AUTO: 1.64 THOUSANDS/ÂΜL (ref 0.6–4.47)
LYMPHOCYTES NFR BLD AUTO: 11 % (ref 14–44)
MCH RBC QN AUTO: 31.4 PG (ref 26.8–34.3)
MCHC RBC AUTO-ENTMCNC: 32.3 G/DL (ref 31.4–37.4)
MCV RBC AUTO: 97 FL (ref 82–98)
MONOCYTES # BLD AUTO: 1.25 THOUSAND/ÂΜL (ref 0.17–1.22)
MONOCYTES NFR BLD AUTO: 9 % (ref 4–12)
NEUTROPHILS # BLD AUTO: 11.37 THOUSANDS/ÂΜL (ref 1.85–7.62)
NEUTS SEG NFR BLD AUTO: 79 % (ref 43–75)
NRBC BLD AUTO-RTO: 0 /100 WBCS
PLATELET # BLD AUTO: 269 THOUSANDS/UL (ref 149–390)
PMV BLD AUTO: 9.9 FL (ref 8.9–12.7)
POTASSIUM SERPL-SCNC: 4.2 MMOL/L (ref 3.5–5.3)
QRS AXIS: 57 DEGREES
QRSD INTERVAL: 68 MS
QT INTERVAL: 324 MS
QTC INTERVAL: 405 MS
RBC # BLD AUTO: 4.07 MILLION/UL (ref 3.81–5.12)
SODIUM SERPL-SCNC: 138 MMOL/L (ref 135–147)
T WAVE AXIS: 15 DEGREES
VENTRICULAR RATE: 94 BPM
WBC # BLD AUTO: 14.36 THOUSAND/UL (ref 4.31–10.16)

## 2024-03-20 PROCEDURE — 80048 BASIC METABOLIC PNL TOTAL CA: CPT | Performed by: INTERNAL MEDICINE

## 2024-03-20 PROCEDURE — 93010 ELECTROCARDIOGRAM REPORT: CPT | Performed by: INTERNAL MEDICINE

## 2024-03-20 PROCEDURE — 99232 SBSQ HOSP IP/OBS MODERATE 35: CPT | Performed by: NURSE PRACTITIONER

## 2024-03-20 PROCEDURE — 85025 COMPLETE CBC W/AUTO DIFF WBC: CPT | Performed by: INTERNAL MEDICINE

## 2024-03-20 RX ADMIN — HEPARIN SODIUM 5000 UNITS: 5000 INJECTION INTRAVENOUS; SUBCUTANEOUS at 06:22

## 2024-03-20 RX ADMIN — HEPARIN SODIUM 5000 UNITS: 5000 INJECTION INTRAVENOUS; SUBCUTANEOUS at 15:46

## 2024-03-20 RX ADMIN — TRAZODONE HYDROCHLORIDE 100 MG: 100 TABLET ORAL at 21:59

## 2024-03-20 RX ADMIN — HEPARIN SODIUM 5000 UNITS: 5000 INJECTION INTRAVENOUS; SUBCUTANEOUS at 22:00

## 2024-03-20 RX ADMIN — Medication 12 MG: at 21:59

## 2024-03-20 RX ADMIN — ATORVASTATIN CALCIUM 10 MG: 10 TABLET, FILM COATED ORAL at 09:45

## 2024-03-20 RX ADMIN — LIDOCAINE 5% 1 PATCH: 700 PATCH TOPICAL at 09:44

## 2024-03-20 RX ADMIN — METOPROLOL TARTRATE 25 MG: 25 TABLET, FILM COATED ORAL at 11:47

## 2024-03-20 RX ADMIN — METOPROLOL TARTRATE 25 MG: 25 TABLET, FILM COATED ORAL at 21:59

## 2024-03-20 RX ADMIN — CLOPIDOGREL 75 MG: 75 TABLET ORAL at 09:45

## 2024-03-20 RX ADMIN — ASPIRIN 81 MG: 81 TABLET, CHEWABLE ORAL at 09:45

## 2024-03-20 NOTE — ASSESSMENT & PLAN NOTE
88-year-old female patient with past medical history of dementia, primarily Tajik speaking who initially was brought in by family due to generalized weakness worse cognitive function  Lab work reveals some leucocytosis   UA mildly positive will follow up culture  CT without any acute abnormalities  PT/OT eval recommending rehab  Plan to go to [facility] 3/22  Follow-up with case management

## 2024-03-20 NOTE — PLAN OF CARE
Problem: Potential for Falls  Goal: Patient will remain free of falls  Description: INTERVENTIONS:  - Educate patient/family on patient safety including physical limitations  - Instruct patient to call for assistance with activity   - Consult OT/PT to assist with strengthening/mobility   - Keep Call bell within reach  - Keep bed low and locked with side rails adjusted as appropriate  - Keep care items and personal belongings within reach  - Initiate and maintain comfort rounds  - Make Fall Risk Sign visible to staff  - Offer Toileting every 3 Hours, in advance of need  - Initiate/Maintain bed alarm  - Obtain necessary fall risk management equipment:   - Apply yellow socks and bracelet for high fall risk patients  - Consider moving patient to room near nurses station  Outcome: Progressing     Problem: PAIN - ADULT  Goal: Verbalizes/displays adequate comfort level or baseline comfort level  Description: Interventions:  - Encourage patient to monitor pain and request assistance  - Assess pain using appropriate pain scale  - Administer analgesics based on type and severity of pain and evaluate response  - Implement non-pharmacological measures as appropriate and evaluate response  - Consider cultural and social influences on pain and pain management  - Notify physician/advanced practitioner if interventions unsuccessful or patient reports new pain  Outcome: Progressing     Problem: INFECTION - ADULT  Goal: Absence or prevention of progression during hospitalization  Description: INTERVENTIONS:  - Assess and monitor for signs and symptoms of infection  - Monitor lab/diagnostic results  - Monitor all insertion sites, i.e. indwelling lines, tubes, and drains  - Monitor endotracheal if appropriate and nasal secretions for changes in amount and color  - [place] appropriate cooling/warming therapies per order  - Administer medications as ordered  - Instruct and encourage patient and family to use good hand hygiene  technique  - Identify and instruct in appropriate isolation precautions for identified infection/condition  Outcome: Progressing  Goal: Absence of fever/infection during neutropenic period  Description: INTERVENTIONS:  - Monitor WBC    Outcome: Progressing     Problem: SAFETY ADULT  Goal: Patient will remain free of falls  Description: INTERVENTIONS:  - Educate patient/family on patient safety including physical limitations  - Instruct patient to call for assistance with activity   - Consult OT/PT to assist with strengthening/mobility   - Keep Call bell within reach  - Keep bed low and locked with side rails adjusted as appropriate  - Keep care items and personal belongings within reach  - Initiate and maintain comfort rounds  - Make Fall Risk Sign visible to staff  - Offer Toileting every 3 Hours, in advance of need  - Initiate/Maintain bed alarm  - Obtain necessary fall risk management equipment:   - Apply yellow socks and bracelet for high fall risk patients  - Consider moving patient to room near nurses station  Outcome: Progressing  Goal: Maintain or return to baseline ADL function  Description: INTERVENTIONS:  -  Assess patient's ability to carry out ADLs; assess patient's baseline for ADL function and identify physical deficits which impact ability to perform ADLs (bathing, care of mouth/teeth, toileting, grooming, dressing, etc.)  - Assess/evaluate cause of self-care deficits   - Assess range of motion  - Assess patient's mobility; develop plan if impaired  - Assess patient's need for assistive devices and provide as appropriate  - Encourage maximum independence but intervene and supervise when necessary  - Involve family in performance of ADLs  - Assess for home care needs following discharge   - Consider OT consult to assist with ADL evaluation and planning for discharge  - Provide patient education as appropriate  Outcome: Progressing  Goal: Maintains/Returns to pre admission functional  level  Description: INTERVENTIONS:  - Perform AM-PAC 6 Click Basic Mobility/ Daily Activity assessment daily.  - Set and communicate daily mobility goal to care team and patient/family/caregiver.   - Collaborate with rehabilitation services on mobility goals if consulted  - Perform Range of Motion 3 times a day.  - Reposition patient every 2 hours.  - Dangle patient 3 times a day  - Stand patient 3 times a day  - Ambulate patient 3 times a day  - Out of bed to chair 3 times a day   - Out of bed for meals 3 times a day  - Out of bed for toileting  - Record patient progress and toleration of activity level   Outcome: Progressing     Problem: DISCHARGE PLANNING  Goal: Discharge to home or other facility with appropriate resources  Description: INTERVENTIONS:  - Identify barriers to discharge w/patient and caregiver  - Arrange for needed discharge resources and transportation as appropriate  - Identify discharge learning needs (meds, wound care, etc.)  - Arrange for interpretive services to assist at discharge as needed  - Refer to Case Management Department for coordinating discharge planning if the patient needs post-hospital services based on physician/advanced practitioner order or complex needs related to functional status, cognitive ability, or social support system  Outcome: Progressing     Problem: Knowledge Deficit  Goal: Patient/family/caregiver demonstrates understanding of disease process, treatment plan, medications, and discharge instructions  Description: Complete learning assessment and assess knowledge base.  Interventions:  - Provide teaching at level of understanding  - Provide teaching via preferred learning methods  Outcome: Progressing     Problem: Prexisting or High Potential for Compromised Skin Integrity  Goal: Skin integrity is maintained or improved  Description: INTERVENTIONS:  - Identify patients at risk for skin breakdown  - Assess and monitor skin integrity  - Assess and monitor nutrition  and hydration status  - Monitor labs   - Assess for incontinence   - Turn and reposition patient  - Assist with mobility/ambulation  - Relieve pressure over bony prominences  - Avoid friction and shearing  - Provide appropriate hygiene as needed including keeping skin clean and dry  - Evaluate need for skin moisturizer/barrier cream  - Collaborate with interdisciplinary team   - Patient/family teaching  - Consider wound care consult   Outcome: Progressing     Problem: Nutrition/Hydration-ADULT  Goal: Nutrient/Hydration intake appropriate for improving, restoring or maintaining nutritional needs  Description: Monitor and assess patient's nutrition/hydration status for malnutrition. Collaborate with interdisciplinary team and initiate plan and interventions as ordered.  Monitor patient's weight and dietary intake as ordered or per policy. Utilize nutrition screening tool and intervene as necessary. Determine patient's food preferences and provide high-protein, high-caloric foods as appropriate.     INTERVENTIONS:  - Monitor oral intake, urinary output, labs, and treatment plans  - Assess nutrition and hydration status and recommend course of action  - Evaluate amount of meals eaten  - Assist patient with eating if necessary   - Allow adequate time for meals  - Recommend/ encourage appropriate diets, oral nutritional supplements, and vitamin/mineral supplements  - Order, calculate, and assess calorie counts as needed  - Recommend, monitor, and adjust tube feedings and TPN/PPN based on assessed needs  - Assess need for intravenous fluids  - Provide specific nutrition/hydration education as appropriate  - Include patient/family/caregiver in decisions related to nutrition  Outcome: Progressing

## 2024-03-20 NOTE — UTILIZATION REVIEW
Continued Stay Review    Date: 3/20                          Current Patient Class: IP   Current Level of Care: MS    HPI:88 y.o. female initially admitted on  3/19    Assessment/Plan:     3/20 IM Note   PT OT eval for possible rehab . F/u CM . Echo-The left ventricular ejection fraction is 55%. Systolic function is normal.  cont tele . Noted to be in new onset afib . CM spoke w/ family and agreed [place] .     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   3/20 Echo  Left Ventricle: Left ventricular cavity size is normal. Wall thickness is normal. The left ventricular ejection fraction is 55%. Systolic function is normal. Wall motion is normal. Diastolic function is normal.    Left Atrium: The atrium is moderately dilated.    Aortic Valve: The aortic valve is trileaflet. The leaflets are moderately thickened. The leaflets are not calcified. The leaflets exhibit normal mobility. There is mild regurgitation. There is mild stenosis. The aortic valve peak gradient is 12 mmHg. The aortic valve mean gradient is 6 mmHg.    Mitral Valve: There is mild regurgitation.    Tricuspid Valve: There is moderate to severe regurgitation.  Results from last 7 days   Lab Units 03/18/24  1552   SARS-COV-2  Negative     Results from last 7 days   Lab Units 03/20/24  0506 03/19/24  0311 03/18/24  1546   WBC Thousand/uL 14.36* 11.92* 11.86*   HEMOGLOBIN g/dL 12.8 12.7 13.8   HEMATOCRIT % 39.6 38.1 42.5   PLATELETS Thousands/uL 269 276 301   NEUTROS ABS Thousands/µL 11.37*  --  8.81*         Results from last 7 days   Lab Units 03/20/24  0540 03/19/24  0311 03/18/24  1546   SODIUM mmol/L 138 137 139   POTASSIUM mmol/L 4.2 3.5 4.3   CHLORIDE mmol/L 103 103 104   CO2 mmol/L 27 23 26   ANION GAP mmol/L 8 11 9   BUN mg/dL 21 17 18   CREATININE mg/dL 0.88 0.90 1.00   EGFR  ml/min/1.73sq m 59 57 50   CALCIUM mg/dL 9.4 8.8 9.5     Results from last 7 days   Lab Units 03/18/24  1546   AST U/L 21   ALT U/L 10   ALK PHOS U/L 107*   TOTAL PROTEIN g/dL 7.7   ALBUMIN g/dL 3.9   TOTAL BILIRUBIN mg/dL 0.83   BILIRUBIN DIRECT mg/dL 0.10     Results from last 7 days   Lab Units 03/20/24  0540 03/19/24  0311 03/18/24  1546   GLUCOSE RANDOM mg/dL 110 103 93         Results from last 7 days   Lab Units 03/19/24  0311 03/19/24  0037 03/18/24  2244 03/18/24  1546   HS TNI 0HR ng/L  --   --  31 14   HS TNI 2HR ng/L  --  32  --   --    HSTNI D2 ng/L  --  1  --   --    HS TNI 4HR ng/L 33  --   --   --    HSTNI D4 ng/L 2  --   --   --      Results from last 7 days   Lab Units 03/18/24  1546   PROTIME seconds 14.4   INR  1.06   PTT seconds 27     Results from last 7 days   Lab Units 03/19/24  0311   TSH 3RD GENERATON uIU/mL 1.799         Results from last 7 days   Lab Units 03/18/24  1546   LACTIC ACID mmol/L 1.2     Results from last 7 days   Lab Units 03/18/24  1710   CLARITY UA  Clear   COLOR UA  Light Yellow   SPEC GRAV UA  1.015   PH UA  5.5   GLUCOSE UA mg/dl Negative   KETONES UA mg/dl Negative   BLOOD UA  Small*   PROTEIN UA mg/dl Negative   NITRITE UA  Negative   BILIRUBIN UA  Negative   UROBILINOGEN UA (BE) mg/dl <2.0   LEUKOCYTES UA  Moderate*   WBC UA /hpf 4-10*   RBC UA /hpf 10-20*   BACTERIA UA /hpf Occasional   EPITHELIAL CELLS WET PREP /hpf Occasional     Results from last 7 days   Lab Units 03/18/24  1552   INFLUENZA A PCR  Negative   INFLUENZA B PCR  Negative   RSV PCR  Negative     Results from last 7 days   Lab Units 03/18/24  1552 03/18/24  1546   BLOOD CULTURE  No Growth at 24 hrs. No Growth at 24 hrs.       Medications:   Scheduled Medications:  aspirin, 81 mg, Oral, Daily  atorvastatin, 10 mg, Oral, Daily  clopidogrel, 75 mg, Oral, Daily  heparin (porcine), 5,000 Units, Subcutaneous, Q8H [facility]  lidocaine, 1 patch, Topical, Daily  melatonin, 12 mg, Oral, HS  metoprolol tartrate, 25 mg,  Oral, Q12H [place]  traZODone, 100 mg, Oral, HS      Continuous IV Infusions:     PRN Meds:  oxyCODONE, 2.5 mg, Oral, Q8H PRN        Discharge Plan: TBD     Network Utilization Review Department  ATTENTION: Please call with any questions or concerns to [phone] and carefully listen to the prompts so that you are directed to the right person. All voicemails are confidential.   For Discharge needs, contact Care Management DC Support Team at [phone] opt. [phone]  Send all requests for admission clinical reviews, approved or denied determinations and any other requests to dedicated fax number below belonging to the campus where the patient is receiving treatment. List of dedicated fax numbers for the Facilities:  FACILITY NAME UR FAX NUMBER   ADMISSION DENIALS (Administrative/Medical Necessity) [phone]   DISCHARGE SUPPORT TEAM (NETWORK) [phone]   PARENT CHILD HEALTH (Maternity/NICU/Pediatrics) [phone]   [facility] [phone]   [facility] [phone]   [facility] [phone]   [facility] [phone]   [facility] [phone]   [facility] [phone]   [facility] [phone]   [facility] [phone]   [facility] [phone]   [facility] [phone]   [facility] [phone]   [facility] [phone]

## 2024-03-20 NOTE — CASE MANAGEMENT
Case Management Progress Note    Patient name Cira Iqbal  Location /-01 MRN [id number]  : 1935 Date 3/20/2024       LOS (days): 1  Geometric Mean LOS (GMLOS) (days): 1.8  Days to GMLOS:0.8        OBJECTIVE:        Current admission status: Inpatient  Preferred Pharmacy:   [facility], [place] - [address] [address]  Phone: [phone] Fax: [phone]    Primary Care Provider: Nely Bustillos MD    Primary Insurance: [employer] RE[employer]  Secondary Insurance: [employer] AND [employer] [facility]    PROGRESS NOTE:  LOCD paperwork completed, excepting son's signatures and PT/OT evaluations- new orders placed. Spoke with son Himanshu over phone regarding choice for SNF- agreed upon [facility]. Reserved in [place]. Son and daughter in law on way to hospital, will sign consents upon arrival.

## 2024-03-20 NOTE — ASSESSMENT & PLAN NOTE
Reports a history of CHF  Moved from [place]  Echo-The left ventricular ejection fraction is 55%. Systolic function is normal.   Currently appears euvolemic

## 2024-03-20 NOTE — PROGRESS NOTES
[facility]  Progress Note  Name: Cira Iqbal I  MRN: [id number]  Unit/Bed#: -01 I Date of Admission: 3/18/2024   Date of Service: 3/20/2024 I Hospital Day: 1    Assessment/Plan   * Generalized weakness  Assessment & Plan  88-year-old female patient with past medical history of dementia, primarily Urdu speaking who initially was brought in by family due to generalized weakness worse cognitive function  Lab work unremarkable  No signs of obvious infection  CT without any acute abnormalities  PT/OT eval for possible rehab  Follow-up with case management    CHF (congestive heart failure) ([facility])  Assessment & Plan  Reports a history of CHF  Moved from [place]  Echo-The left ventricular ejection fraction is 55%. Systolic function is normal.   Currently appears euvolemic    New onset a-fib ([facility])  Assessment & Plan  Patient was brought by family member due to progressively worsening generalized weakness, cognitive decline.  Noted to be in new onset atrial fibrillation  Currently rate controlled  Continue Lopressor 25 mg twice daily  Continue telemetry monitoring  Continue echo  Have a discussion with family regarding Eliquis given advanced age/dementia and increased bleeding risks follow-up for antiplatelet therapy        Sacral wound  Assessment & Plan  Continue with local wound care    Hypertension  Assessment & Plan  BP currently controlled  Continue metoprolol             VTE Pharmacologic Prophylaxis: VTE Score: 3 Moderate Risk (Score 3-4) - Pharmacological DVT Prophylaxis Ordered: heparin.    Mobility:   Basic Mobility Inpatient Raw Score: 18  JH-HLM Goal: 6: Walk 10 steps or more  JH-HLM Achieved: 2: Bed activities/Dependent transfer      Patient Centered Rounds: I performed bedside rounds with nursing staff today.   Discussions with Specialists or Other Care Team Provider: reviewed notes spoke with CM    Education and Discussions with Family / Patient: spoke with family    Total  Time Spent on Date of Encounter in care of patient: 35 mins. This time was spent on one or more of the following: performing physical exam; counseling and coordination of care; obtaining or reviewing history; documenting in the medical record; reviewing/ordering tests, medications or procedures; communicating with other healthcare professionals and discussing with patient's family/caregivers.    Current Length of Stay: 1 day(s)  Current Patient Status: Inpatient   Certification Statement: The patient will continue to require additional inpatient hospital stay due to PT OT evaluation  Discharge Plan: Anticipate discharge in 24-48 hrs to discharge location to be determined pending rehab evaluations.    Code Status: Level 3 - DNAR and DNI    Subjective:   Sleeping in bed, resting comfortably, easy to wake up offers no complaints    Objective:     Vitals:   Temp (24hrs), Av.3 °F (36.8 °C), Min:97.3 °F (36.3 °C), Max:99.2 °F (37.3 °C)    Temp:  [97.3 °F (36.3 °C)-99.2 °F (37.3 °C)] 97.4 °F (36.3 °C)  HR:  [] 102  Resp:  [16-18] 18  BP: (112-143)/(51-87) 112/71  SpO2:  [96 %-98 %] 98 %  Body mass index is 30.66 kg/m².     Input and Output Summary (last 24 hours):     Intake/Output Summary (Last 24 hours) at 3/20/2024 1018  Last data filed at 3/19/2024 2100  Gross per 24 hour   Intake 480 ml   Output 300 ml   Net 180 ml       Physical Exam:   Physical Exam  Vitals reviewed.   Constitutional:       General: She is not in acute distress.     Appearance: She is ill-appearing.   Cardiovascular:      Rate and Rhythm: Normal rate.   Pulmonary:      Effort: No respiratory distress.   Skin:     General: Skin is warm.   Neurological:      Mental Status: She is alert. Mental status is at baseline.   Psychiatric:         Mood and Affect: Mood normal.        Additional Data:     Labs:  Results from last 7 days   Lab Units 24  0506   WBC Thousand/uL 14.36*   HEMOGLOBIN g/dL 12.8   HEMATOCRIT % 39.6   PLATELETS  Thousands/uL 269   NEUTROS PCT % 79*   LYMPHS PCT % 11*   MONOS PCT % 9   EOS PCT % 0     Results from last 7 days   Lab Units 03/20/24  0540 03/19/24  0311 03/18/24  1546   SODIUM mmol/L 138   < > 139   POTASSIUM mmol/L 4.2   < > 4.3   CHLORIDE mmol/L 103   < > 104   CO2 mmol/L 27   < > 26   BUN mg/dL 21   < > 18   CREATININE mg/dL 0.88   < > 1.00   ANION GAP mmol/L 8   < > 9   CALCIUM mg/dL 9.4   < > 9.5   ALBUMIN g/dL  --   --  3.9   TOTAL BILIRUBIN mg/dL  --   --  0.83   ALK PHOS U/L  --   --  107*   ALT U/L  --   --  10   AST U/L  --   --  21   GLUCOSE RANDOM mg/dL 110   < > 93    < > = values in this interval not displayed.     Results from last 7 days   Lab Units 03/18/24  1546   INR  1.06             Results from last 7 days   Lab Units 03/18/24  1546   LACTIC ACID mmol/L 1.2       Lines/Drains:  Invasive Devices       Peripheral Intravenous Line  Duration             Peripheral IV 03/18/24 Left Antecubital 1 day                    Recent Cultures (last 7 days):   Results from last 7 days   Lab Units 03/18/24  1552 03/18/24  1546   BLOOD CULTURE  No Growth at 24 hrs. No Growth at 24 hrs.       Last 24 Hours Medication List:   Current Facility-Administered Medications   Medication Dose Route Frequency Provider Last Rate    aspirin  81 mg Oral Daily Boone RODRIGUEZ MD      atorvastatin  10 mg Oral Daily Boone RODRIGUEZ MD      clopidogrel  75 mg Oral Daily Boone RODRIGUEZ MD      heparin (porcine)  5,000 Units Subcutaneous Q8H [facility] Boone RODRIGUEZ MD      lidocaine  1 patch Topical Daily Peter Melgoza MD      melatonin  12 mg Oral HS Boone RODRIGUEZ MD      metoprolol tartrate  25 mg Oral Q12H [facility] Boone RODRIGUEZ MD      oxyCODONE  2.5 mg Oral Q8H PRN Peter Melgoza MD      traZODone  100 mg Oral HS Boone RODRIGUEZ MD          Today, Patient Was Seen By: ELAN Gupta    **Please Note: This note may have been constructed using a voice recognition system.**

## 2024-03-20 NOTE — CASE MANAGEMENT
Case Management Progress Note    Patient name Cira Iqbal  Location /- MRN [id number]  : 1935 Date 3/20/2024       LOS (days): 1  Geometric Mean LOS (GMLOS) (days): 1.8  Days to GMLOS:0.7        OBJECTIVE:        Current admission status: Inpatient  Preferred Pharmacy:   [facility] AT [facility], [place] - [address] [address]  Phone: [phone] Fax: [phone]    Primary Care Provider: Nely Bustillos MD    Primary Insurance: [employer] REP  Secondary Insurance: [place] [employer] AND [facility]    PROGRESS NOTE:    Met with son and daughter in law at the bedside to sign consents for LOCD. Pending PT/OT notes for submission of documentation. Asked [facility] if they would take her with a pending LOCD or would wait for determination.

## 2024-03-20 NOTE — ASSESSMENT & PLAN NOTE
Patient was brought by family member due to progressively worsening generalized weakness, cognitive decline.  Noted to be in new onset atrial fibrillation  Currently rate controlled  Was on Lopressor 25 mg twice daily  Given hypotension will decresed to 12.5 BID  Echo-The left ventricular ejection fraction is 55%. Systolic function is normal   Have a discussion with family regarding Eliquis given advanced age/dementia and increased bleeding risks follow-up for antiplatelet therapy  Has Chadsvasc of 5

## 2024-03-20 NOTE — ASSESSMENT & PLAN NOTE
Chronic diastolic CHF, in the setting of hypertension, currently euvolemic, required updated echocardiogram to determine type.   Moved from [place]  Echo-The left ventricular ejection fraction is 55%. Systolic function is normal.   Currently appears euvolemic

## 2024-03-20 NOTE — ASSESSMENT & PLAN NOTE
88-year-old female patient with past medical history of dementia, primarily Urdu speaking who initially was brought in by family due to generalized weakness worse cognitive function  Lab work unremarkable  No signs of obvious infection  CT without any acute abnormalities  PT/OT eval for possible rehab  Follow-up with case management

## 2024-03-21 LAB
ANION GAP SERPL CALCULATED.3IONS-SCNC: 7 MMOL/L (ref 4–13)
BASOPHILS # BLD AUTO: 0.03 THOUSANDS/ÂΜL (ref 0–0.1)
BASOPHILS NFR BLD AUTO: 0 % (ref 0–1)
BUN SERPL-MCNC: 24 MG/DL (ref 5–25)
CALCIUM SERPL-MCNC: 9.1 MG/DL (ref 8.4–10.2)
CHLORIDE SERPL-SCNC: 102 MMOL/L (ref 96–108)
CO2 SERPL-SCNC: 30 MMOL/L (ref 21–32)
CREAT SERPL-MCNC: 0.96 MG/DL (ref 0.6–1.3)
EOSINOPHIL # BLD AUTO: 0.05 THOUSAND/ÂΜL (ref 0–0.61)
EOSINOPHIL NFR BLD AUTO: 0 % (ref 0–6)
ERYTHROCYTE [DISTWIDTH] IN BLOOD BY AUTOMATED COUNT: 13.5 % (ref 11.6–15.1)
GFR SERPL CREATININE-BSD FRML MDRD: 53 ML/MIN/1.73SQ M
GLUCOSE SERPL-MCNC: 107 MG/DL (ref 65–140)
HCT VFR BLD AUTO: 38.5 % (ref 34.8–46.1)
HGB BLD-MCNC: 12.6 G/DL (ref 11.5–15.4)
IMM GRANULOCYTES # BLD AUTO: 0.06 THOUSAND/UL (ref 0–0.2)
IMM GRANULOCYTES NFR BLD AUTO: 1 % (ref 0–2)
LYMPHOCYTES # BLD AUTO: 1.67 THOUSANDS/ÂΜL (ref 0.6–4.47)
LYMPHOCYTES NFR BLD AUTO: 14 % (ref 14–44)
MAGNESIUM SERPL-MCNC: 1.7 MG/DL (ref 1.9–2.7)
MCH RBC QN AUTO: 31 PG (ref 26.8–34.3)
MCHC RBC AUTO-ENTMCNC: 32.7 G/DL (ref 31.4–37.4)
MCV RBC AUTO: 95 FL (ref 82–98)
MONOCYTES # BLD AUTO: 1.08 THOUSAND/ÂΜL (ref 0.17–1.22)
MONOCYTES NFR BLD AUTO: 9 % (ref 4–12)
NEUTROPHILS # BLD AUTO: 9.52 THOUSANDS/ÂΜL (ref 1.85–7.62)
NEUTS SEG NFR BLD AUTO: 76 % (ref 43–75)
NRBC BLD AUTO-RTO: 0 /100 WBCS
PLATELET # BLD AUTO: 321 THOUSANDS/UL (ref 149–390)
PMV BLD AUTO: 9.5 FL (ref 8.9–12.7)
POTASSIUM SERPL-SCNC: 3.7 MMOL/L (ref 3.5–5.3)
RBC # BLD AUTO: 4.06 MILLION/UL (ref 3.81–5.12)
SODIUM SERPL-SCNC: 139 MMOL/L (ref 135–147)
WBC # BLD AUTO: 12.41 THOUSAND/UL (ref 4.31–10.16)

## 2024-03-21 PROCEDURE — 83735 ASSAY OF MAGNESIUM: CPT | Performed by: NURSE PRACTITIONER

## 2024-03-21 PROCEDURE — 85025 COMPLETE CBC W/AUTO DIFF WBC: CPT | Performed by: INTERNAL MEDICINE

## 2024-03-21 PROCEDURE — 80048 BASIC METABOLIC PNL TOTAL CA: CPT | Performed by: INTERNAL MEDICINE

## 2024-03-21 PROCEDURE — 97167 OT EVAL HIGH COMPLEX 60 MIN: CPT

## 2024-03-21 PROCEDURE — 97163 PT EVAL HIGH COMPLEX 45 MIN: CPT

## 2024-03-21 PROCEDURE — 87086 URINE CULTURE/COLONY COUNT: CPT | Performed by: NURSE PRACTITIONER

## 2024-03-21 PROCEDURE — 99232 SBSQ HOSP IP/OBS MODERATE 35: CPT | Performed by: NURSE PRACTITIONER

## 2024-03-21 RX ORDER — LANOLIN ALCOHOL/MO/W.PET/CERES
400 CREAM (GRAM) TOPICAL 2 TIMES DAILY
Status: COMPLETED | OUTPATIENT
Start: 2024-03-21 | End: 2024-03-22

## 2024-03-21 RX ADMIN — Medication 12 MG: at 23:12

## 2024-03-21 RX ADMIN — Medication 400 MG: at 19:26

## 2024-03-21 RX ADMIN — HEPARIN SODIUM 5000 UNITS: 5000 INJECTION INTRAVENOUS; SUBCUTANEOUS at 14:34

## 2024-03-21 RX ADMIN — LIDOCAINE 5% 1 PATCH: 700 PATCH TOPICAL at 09:34

## 2024-03-21 RX ADMIN — DICLOFENAC SODIUM 2 G: 10 GEL TOPICAL at 19:26

## 2024-03-21 RX ADMIN — HEPARIN SODIUM 5000 UNITS: 5000 INJECTION INTRAVENOUS; SUBCUTANEOUS at 05:38

## 2024-03-21 RX ADMIN — Medication 400 MG: at 10:25

## 2024-03-21 RX ADMIN — CLOPIDOGREL 75 MG: 75 TABLET ORAL at 09:34

## 2024-03-21 RX ADMIN — HEPARIN SODIUM 5000 UNITS: 5000 INJECTION INTRAVENOUS; SUBCUTANEOUS at 23:12

## 2024-03-21 RX ADMIN — ATORVASTATIN CALCIUM 10 MG: 10 TABLET, FILM COATED ORAL at 09:34

## 2024-03-21 RX ADMIN — Medication 12.5 MG: at 23:12

## 2024-03-21 RX ADMIN — ASPIRIN 81 MG: 81 TABLET, CHEWABLE ORAL at 09:34

## 2024-03-21 RX ADMIN — TRAZODONE HYDROCHLORIDE 100 MG: 100 TABLET ORAL at 23:12

## 2024-03-21 RX ADMIN — DICLOFENAC SODIUM 2 G: 10 GEL TOPICAL at 12:26

## 2024-03-21 NOTE — PLAN OF CARE
Problem: Potential for Falls  Goal: Patient will remain free of falls  Description: INTERVENTIONS:  - Educate patient/family on patient safety including physical limitations  - Instruct patient to call for assistance with activity   - Consult OT/PT to assist with strengthening/mobility   - Keep Call bell within reach  - Keep bed low and locked with side rails adjusted as appropriate  - Keep care items and personal belongings within reach  - Initiate and maintain comfort rounds  - Make Fall Risk Sign visible to staff  - Offer Toileting every 2 Hours, in advance of need  - Initiate/Maintain 2alarm  - Obtain necessary fall risk management equipment: 2  - Apply yellow socks and bracelet for high fall risk patients  - Consider moving patient to room near nurses station  Outcome: Progressing     Problem: PAIN - ADULT  Goal: Verbalizes/displays adequate comfort level or baseline comfort level  Description: Interventions:  - Encourage patient to monitor pain and request assistance  - Assess pain using appropriate pain scale  - Administer analgesics based on type and severity of pain and evaluate response  - Implement non-pharmacological measures as appropriate and evaluate response  - Consider cultural and social influences on pain and pain management  - Notify physician/advanced practitioner if interventions unsuccessful or patient reports new pain  Outcome: Progressing     Problem: INFECTION - ADULT  Goal: Absence or prevention of progression during hospitalization  Description: INTERVENTIONS:  - Assess and monitor for signs and symptoms of infection  - Monitor lab/diagnostic results  - Monitor all insertion sites, i.e. indwelling lines, tubes, and drains  - Monitor endotracheal if appropriate and nasal secretions for changes in amount and color  - Sanders appropriate cooling/warming therapies per order  - Administer medications as ordered  - Instruct and encourage patient and family to use good hand hygiene  technique  - Identify and instruct in appropriate isolation precautions for identified infection/condition  Outcome: Progressing  Goal: Absence of fever/infection during neutropenic period  Description: INTERVENTIONS:  - Monitor WBC    Outcome: Progressing     Problem: SAFETY ADULT  Goal: Patient will remain free of falls  Description: INTERVENTIONS:  - Educate patient/family on patient safety including physical limitations  - Instruct patient to call for assistance with activity   - Consult OT/PT to assist with strengthening/mobility   - Keep Call bell within reach  - Keep bed low and locked with side rails adjusted as appropriate  - Keep care items and personal belongings within reach  - Initiate and maintain comfort rounds  - Make Fall Risk Sign visible to staff  - Offer Toileting every 2 Hours, in advance of need  - Initiate/Maintain 2alarm  - Obtain necessary fall risk management equipment: 2  - Apply yellow socks and bracelet for high fall risk patients  - Consider moving patient to room near nurses station  Outcome: Progressing  Goal: Maintain or return to baseline ADL function  Description: INTERVENTIONS:  -  Assess patient's ability to carry out ADLs; assess patient's baseline for ADL function and identify physical deficits which impact ability to perform ADLs (bathing, care of mouth/teeth, toileting, grooming, dressing, etc.)  - Assess/evaluate cause of self-care deficits   - Assess range of motion  - Assess patient's mobility; develop plan if impaired  - Assess patient's need for assistive devices and provide as appropriate  - Encourage maximum independence but intervene and supervise when necessary  - Involve family in performance of ADLs  - Assess for home care needs following discharge   - Consider OT consult to assist with ADL evaluation and planning for discharge  - Provide patient education as appropriate  Outcome: Progressing  Goal: Maintains/Returns to pre admission functional level  Description:  INTERVENTIONS:  - Perform AM-PAC 6 Click Basic Mobility/ Daily Activity assessment daily.  - Set and communicate daily mobility goal to care team and patient/family/caregiver.   - Collaborate with rehabilitation services on mobility goals if consulted  - Perform Range of Motion 2 times a day.  - Reposition patient every 2 hours.  - Dangle patient 2 times a day  - Stand patient 2 times a day  - Ambulate patient 2 times a day  - Out of bed to chair 2 times a day   - Out of bed for meals 2 times a day  - Out of bed for toileting  - Record patient progress and toleration of activity level   Outcome: Progressing     Problem: DISCHARGE PLANNING  Goal: Discharge to home or other facility with appropriate resources  Description: INTERVENTIONS:  - Identify barriers to discharge w/patient and caregiver  - Arrange for needed discharge resources and transportation as appropriate  - Identify discharge learning needs (meds, wound care, etc.)  - Arrange for interpretive services to assist at discharge as needed  - Refer to Case Management Department for coordinating discharge planning if the patient needs post-hospital services based on physician/advanced practitioner order or complex needs related to functional status, cognitive ability, or social support system  Outcome: Progressing     Problem: Knowledge Deficit  Goal: Patient/family/caregiver demonstrates understanding of disease process, treatment plan, medications, and discharge instructions  Description: Complete learning assessment and assess knowledge base.  Interventions:  - Provide teaching at level of understanding  - Provide teaching via preferred learning methods  Outcome: Progressing     Problem: Prexisting or High Potential for Compromised Skin Integrity  Goal: Skin integrity is maintained or improved  Description: INTERVENTIONS:  - Identify patients at risk for skin breakdown  - Assess and monitor skin integrity  - Assess and monitor nutrition and hydration  status  - Monitor labs   - Assess for incontinence   - Turn and reposition patient  - Assist with mobility/ambulation  - Relieve pressure over bony prominences  - Avoid friction and shearing  - Provide appropriate hygiene as needed including keeping skin clean and dry  - Evaluate need for skin moisturizer/barrier cream  - Collaborate with interdisciplinary team   - Patient/family teaching  - Consider wound care consult   Outcome: Progressing     Problem: Nutrition/Hydration-ADULT  Goal: Nutrient/Hydration intake appropriate for improving, restoring or maintaining nutritional needs  Description: Monitor and assess patient's nutrition/hydration status for malnutrition. Collaborate with interdisciplinary team and initiate plan and interventions as ordered.  Monitor patient's weight and dietary intake as ordered or per policy. Utilize nutrition screening tool and intervene as necessary. Determine patient's food preferences and provide high-protein, high-caloric foods as appropriate.     INTERVENTIONS:  - Monitor oral intake, urinary output, labs, and treatment plans  - Assess nutrition and hydration status and recommend course of action  - Evaluate amount of meals eaten  - Assist patient with eating if necessary   - Allow adequate time for meals  - Recommend/ encourage appropriate diets, oral nutritional supplements, and vitamin/mineral supplements  - Order, calculate, and assess calorie counts as needed  - Recommend, monitor, and adjust tube feedings and TPN/PPN based on assessed needs  - Assess need for intravenous fluids  - Provide specific nutrition/hydration education as appropriate  - Include patient/family/caregiver in decisions related to nutrition  Outcome: Progressing

## 2024-03-21 NOTE — PROGRESS NOTES
Patient sitting bedside chair BP 86/60 manual, asymptomatic Patient returned to bed at this time . Trendelenburg . Abril ANSARI aware. NNO

## 2024-03-21 NOTE — UTILIZATION REVIEW
NOTIFICATION OF INPATIENT ADMISSION   AUTHORIZATION REQUEST   SERVICING FACILITY:   [place] [address]  Tax ID: [id number]  NPI: [id number] ATTENDING PROVIDER:  Attending Name and NPI#: Bossman Navarrete Md [[id number]]  Address: [address] [place] [address]  Phone: [phone]     ADMISSION INFORMATION:  Place of Service: Inpatient [facility]  Place of Service Code: 21  Inpatient Admission Date/Time: 3/19/24  2:35 PM  Discharge Date/Time: No discharge date for patient encounter.  Admitting Diagnosis Code/Description:  Weakness generalized [R53.1]  Decubitus ulcer [L89.90]  Physical deconditioning [R53.81]  New onset a-fib (HCC) [I48.91]  Ambulatory dysfunction [R26.2]     UTILIZATION REVIEW CONTACT:  Na Thompson, Utilization   Network Utilization Review Department  Phone: [phone]  Fax [phone]  Email: [email or web address]  Contact for approvals/pending authorizations, clinical reviews, and discharge.     PHYSICIAN ADVISORY SERVICES:  Medical Necessity Denial & Lptc-cu-Eub[facility] Review  Phone: [phone]  Fax: [phone]  Email: [email or web address]     DISCHARGE SUPPORT TEAM:  For Patients Discharge Needs & Updates  Phone: [phone] opt. 2 Fax: [phone]  Email: [email or web address]

## 2024-03-21 NOTE — OCCUPATIONAL THERAPY NOTE
"  Occupational Therapy Evaluation      Cira Iqbal    3/21/2024    Patient Active Problem List   Diagnosis    Cellulitis of left wrist    Hypertension    GERD (gastroesophageal reflux disease)    Cellulitis of right hand    CHF (congestive heart failure) (HCC)    Generalized weakness    New onset a-fib (HCC)    Sacral wound       Past Medical History:   Diagnosis Date    CHF (congestive heart failure) (HCC)     Hyperlipidemia     Hypertension     Knee pain     right       Past Surgical History:   Procedure Laterality Date    BACK SURGERY      FL GUIDED NEEDLE PLAC BX/ASP/INJ  3/7/2019    KNEE SURGERY          03/21/24 0840   OT Last Visit   OT Visit Date 03/21/24   Note Type   Note type Evaluation   Additional Comments Utilized  ([id number]) to communicate in Persian. Pt agreeable to OT session and upon arrival was supine in bed with HOB elevated.   Pain Assessment   Pain Assessment Tool 0-10   Pain Score   (\"a little\")   Pain Location/Orientation Orientation: Right;Location: Knee   Pain Onset/Description Onset: Ongoing;Frequency: Constant/Continuous   Hospital Pain Intervention(s) Repositioned;Medication (See MAR);Ambulation/increased activity   Restrictions/Precautions   Weight Bearing Precautions Per Order No   Other Precautions Cognitive;Chair Alarm;Bed Alarm;Fall Risk;Pain   Home Living   Type of Home House   Home Layout Two level   Bathroom Accessibility Accessible   Home Equipment Cane;Walker;Wheelchair-manual  (pt reports utilizing SPC at baseline)   Prior Function   Level of [place] Needs assistance with ADLs;Needs assistance with IADLS;Independent with functional mobility   Lives With Family   Receives Help From Family   IADLs Family/Friend/Other provides transportation;Family/Friend/Other provides meals;Family/Friend/Other provides medication management   Falls in the last 6 months   (pt denies fall hx)   Vocational Retired   Comments Unable to obtain reliable PLOF/ home set up information as " pt poor historian, CM to follow up   Lifestyle   Autonomy Per chart review, patient requires assistance with ADLs/IADLs and lives with family in a two story house   Reciprocal Relationships Supportive family   Service to Others Retired   ADL   Eating Assistance 5  Supervision/Setup   Grooming Assistance 4  Minimal Assistance   UB Bathing Assistance 3  Moderate Assistance   LB Bathing Assistance 2  Maximal Assistance   UB Dressing Assistance 3  Moderate Assistance   LB Dressing Assistance 2  Maximal Assistance   Toileting Assistance  2  Maximal Assistance   Additional Comments ADL levels based on functional performance during OT eval.   Bed Mobility   Supine to Sit 3  Moderate assistance   Additional items Assist x 2;HOB elevated;Bedrails;Increased time required;Verbal cues;LE management   Sit to Supine   (DNT: pt seated OOB in recliner at end of session)   Transfers   Sit to Stand 3  Moderate assistance   Additional items Assist x 2;Increased time required;Verbal cues   Stand to Sit 3  Moderate assistance   Additional items Assist x 2;Increased time required;Verbal cues   Stand pivot 3  Moderate assistance   Additional items Assist x 2;Increased time required;Verbal cues  (c RW)   Additional Comments BP once seated in recliner: 94/56. Pt denied lightheaded/dizziness.   Balance   Static Sitting Fair -   Dynamic Sitting Poor +   Static Standing Poor   Dynamic Standing Poor -   Activity Tolerance   Activity Tolerance Patient limited by fatigue;Patient limited by pain   Medical Staff Made Aware Pt seen as a co-eval with PT due to the patient's co-morbidities & clinically unstable presentation   RUE Assessment   RUE Assessment WFL  (grossly 3+/5 MMT based on functional performance during OT eval)   LUE Assessment   LUE Assessment WFL  (grossly 3+/5 MMT based on functional performance during OT eval)   Hand Function   Gross Motor Coordination Functional   Fine Motor Coordination Functional   Sensation   Light Touch No  apparent deficits   Cognition   Overall Cognitive Status Impaired   Arousal/Participation Alert;Responsive;Cooperative   Attention Attends with cues to redirect   Orientation Level Oriented to person;Disoriented to place;Disoriented to time;Disoriented to situation  (oriented to name but disoriented to birthdate)   Memory Decreased recall of biographical information;Decreased recall of recent events;Decreased recall of precautions   Following Commands Follows one step commands with increased time or repetition   Assessment   Limitation Decreased ADL status;Decreased UE strength;Decreased Safe judgement during ADL;Decreased cognition;Decreased endurance;Decreased high-level ADLs;Decreased self-care trans   Prognosis Good   Assessment Patient is a 88 y.o. female seen for OT evaluation s/p admit to [facility]  on 3/18/2024 w/Generalized weakness. Commorbidities affecting patient's functional performance at time of assessment include: HTN, CHF, a-fib, and sacral wound. Orders placed for OT evaluation and treatment and up and OOB as tolerated . Performed at least two patient identifiers during session including name and wristband.  Prior to admission, Per chart review, patient requires assistance with ADLs/IADLs and lives with family in a two story house. Personal factors affecting patient at time of initial evaluation include: limited insight into deficits, difficulty performing ADLs, and difficulty performing IADLs. Upon evaluation, patient requires moderate assist for UB ADLs, maximal assist for LB ADLs, transfers and functional ambulation in room and bathroom with moderate assist of 2, with the use of Rolling Walker.  Patient is oriented to name,, disoriented to time,, disoriented to place,, and disoriented to situation, and presents with limited ability to recall information after a brief period of time (short term memory) / working memory .  Occupational performance is affected by the following  deficits: orientation, attention span, decreased muscle strength, dynamic sit/ stand balance deficit with poor standing tolerance time for self care and functional mobility, decreased activity tolerance, impaired memory, impaired problem solving, decreased safety awareness, increased pain, and delayed righting and equilibrium reactions. Patient to benefit from continued Occupational Therapy treatment while in the hospital to address deficits as defined above and maximize level of functional independence with ADLs and functional mobility. Occupational Performance areas to address include: eating, grooming , bathing/ shower, dressing, toilet hygiene, transfer to all surfaces, and functional ambulation. From OT standpoint, recommendation at time of d/c would be Level II (moderate resource intensity).   Plan   Treatment Interventions ADL retraining;Functional transfer training;Endurance training;UE strengthening/ROM;Cognitive reorientation;Patient/family training;Equipment evaluation/education;Compensatory technique education;Activityengagement   Goal Expiration Date 04/05/24   OT Treatment Day 0   OT Frequency 3-5x/wk   Discharge Recommendation   Rehab Resource Intensity Level, OT II (Moderate Resource Intensity)   AM-PAC Daily Activity Inpatient   Lower Body Dressing 1   Bathing 2   Toileting 1   Upper Body Dressing 2   Grooming 3   Eating 3   Daily Activity Raw Score 12   Daily Activity Standardized Score (Calc for Raw Score >=11) 30.6   AM-PAC Applied Cognition Inpatient   Following a Speech/Presentation 2   Understanding Ordinary Conversation 3   Taking Medications 2   Remembering Where Things Are Placed or Put Away 1   Remembering List of 4-5 Errands 1   Taking Care of Complicated Tasks 1   Applied Cognition Raw Score 10   Applied Cognition Standardized Score 24.98   Modified [place] Scale   Modified Josh Scale 4   End of Consult   Patient Position at End of Consult Bedside chair;Bed/Chair alarm activated;All  needs within reach   Nurse Communication Nurse aware of consult     GOALS:    *ADL transfers with CGA for inc'd independence with ADLs/purposeful tasks    *UB ADL with (S) for inc'd independence with self cares    *LB ADL with Min (A) using AE prn for inc'd independence with self cares    *Toileting with Min (A) for clothing management and hygiene for return to PLOF with personal care    *Increase stand tolerance x 3  m for inc'd tolerance with standing purposeful tasks    *Participate in 10m UE therex to increase overall stamina/activity tolerance for purposeful tasks    *Bed mobility- Min (A) for inc'd independence to manage own comfort and initiate EOB & OOB purposeful tasks    *Patient will verbalize 3 safety awareness/ principles to prevent falls in the home setting.     *Patient will verbalize and demonstrate use of energy conservation/deep breathing techniques and work simplification skills during functional activities with no verbal cues.     *Patient will increase OOB/sitting tolerance to 2-4 hours per day to increase participation in self-care and leisure tasks with no s/s of exertion.     *Patient will engage in ongoing cognitive assessment to assist with safe discharge planning/recommendations.      *Pt will demonstrate use of long handled AE during 100% of tx sessions for increased ADL safety and independence following D/C     LORETO Varma, OTR/L

## 2024-03-21 NOTE — PLAN OF CARE
Problem: Potential for Falls  Goal: Patient will remain free of falls  Description: INTERVENTIONS:  - Educate patient/family on patient safety including physical limitations  - Instruct patient to call for assistance with activity   - Consult OT/PT to assist with strengthening/mobility   - Keep Call bell within reach  - Keep bed low and locked with side rails adjusted as appropriate  - Keep care items and personal belongings within reach  - Initiate and maintain comfort rounds  - Make Fall Risk Sign visible to staff  - Offer Toileting every 3 Hours, in advance of need  - Initiate/Maintain bed alarm  - Obtain necessary fall risk management equipment:   - Apply yellow socks and bracelet for high fall risk patients  - Consider moving patient to room near nurses station  Outcome: Progressing     Problem: PAIN - ADULT  Goal: Verbalizes/displays adequate comfort level or baseline comfort level  Description: Interventions:  - Encourage patient to monitor pain and request assistance  - Assess pain using appropriate pain scale  - Administer analgesics based on type and severity of pain and evaluate response  - Implement non-pharmacological measures as appropriate and evaluate response  - Consider cultural and social influences on pain and pain management  - Notify physician/advanced practitioner if interventions unsuccessful or patient reports new pain  Outcome: Progressing     Problem: INFECTION - ADULT  Goal: Absence or prevention of progression during hospitalization  Description: INTERVENTIONS:  - Assess and monitor for signs and symptoms of infection  - Monitor lab/diagnostic results  - Monitor all insertion sites, i.e. indwelling lines, tubes, and drains  - Monitor endotracheal if appropriate and nasal secretions for changes in amount and color  - [place] appropriate cooling/warming therapies per order  - Administer medications as ordered  - Instruct and encourage patient and family to use good hand hygiene  technique  - Identify and instruct in appropriate isolation precautions for identified infection/condition  Outcome: Progressing  Goal: Absence of fever/infection during neutropenic period  Description: INTERVENTIONS:  - Monitor WBC    Outcome: Progressing     Problem: SAFETY ADULT  Goal: Patient will remain free of falls  Description: INTERVENTIONS:  - Educate patient/family on patient safety including physical limitations  - Instruct patient to call for assistance with activity   - Consult OT/PT to assist with strengthening/mobility   - Keep Call bell within reach  - Keep bed low and locked with side rails adjusted as appropriate  - Keep care items and personal belongings within reach  - Initiate and maintain comfort rounds  - Make Fall Risk Sign visible to staff  - Offer Toileting every 3 Hours, in advance of need  - Initiate/Maintain 2alarm  - Obtain necessary fall risk management equipment:   - Apply yellow socks and bracelet for high fall risk patients  - Consider moving patient to room near nurses station  Outcome: Progressing  Goal: Maintain or return to baseline ADL function  Description: INTERVENTIONS:  -  Assess patient's ability to carry out ADLs; assess patient's baseline for ADL function and identify physical deficits which impact ability to perform ADLs (bathing, care of mouth/teeth, toileting, grooming, dressing, etc.)  - Assess/evaluate cause of self-care deficits   - Assess range of motion  - Assess patient's mobility; develop plan if impaired  - Assess patient's need for assistive devices and provide as appropriate  - Encourage maximum independence but intervene and supervise when necessary  - Involve family in performance of ADLs  - Assess for home care needs following discharge   - Consider OT consult to assist with ADL evaluation and planning for discharge  - Provide patient education as appropriate  Outcome: Progressing  Goal: Maintains/Returns to pre admission functional level  Description:  INTERVENTIONS:  - Perform AM-PAC 6 Click Basic Mobility/ Daily Activity assessment daily.  - Set and communicate daily mobility goal to care team and patient/family/caregiver.   - Collaborate with rehabilitation services on mobility goals if consulted  - Perform Range of Motion 3 times a day.  - Reposition patient every 2 hours.  - Dangle patient 3 times a day  - Stand patient 3 times a day  - Ambulate patient 3 times a day  - Out of bed to chair 3 times a day   - Out of bed for meals 3 times a day  - Out of bed for toileting  - Record patient progress and toleration of activity level   Outcome: Progressing     Problem: DISCHARGE PLANNING  Goal: Discharge to home or other facility with appropriate resources  Description: INTERVENTIONS:  - Identify barriers to discharge w/patient and caregiver  - Arrange for needed discharge resources and transportation as appropriate  - Identify discharge learning needs (meds, wound care, etc.)  - Arrange for interpretive services to assist at discharge as needed  - Refer to Case Management Department for coordinating discharge planning if the patient needs post-hospital services based on physician/advanced practitioner order or complex needs related to functional status, cognitive ability, or social support system  Outcome: Progressing     Problem: Knowledge Deficit  Goal: Patient/family/caregiver demonstrates understanding of disease process, treatment plan, medications, and discharge instructions  Description: Complete learning assessment and assess knowledge base.  Interventions:  - Provide teaching at level of understanding  - Provide teaching via preferred learning methods  Outcome: Progressing     Problem: Prexisting or High Potential for Compromised Skin Integrity  Goal: Skin integrity is maintained or improved  Description: INTERVENTIONS:  - Identify patients at risk for skin breakdown  - Assess and monitor skin integrity  - Assess and monitor nutrition and hydration  status  - Monitor labs   - Assess for incontinence   - Turn and reposition patient  - Assist with mobility/ambulation  - Relieve pressure over bony prominences  - Avoid friction and shearing  - Provide appropriate hygiene as needed including keeping skin clean and dry  - Evaluate need for skin moisturizer/barrier cream  - Collaborate with interdisciplinary team   - Patient/family teaching  - Consider wound care consult   Outcome: Progressing     Problem: Nutrition/Hydration-ADULT  Goal: Nutrient/Hydration intake appropriate for improving, restoring or maintaining nutritional needs  Description: Monitor and assess patient's nutrition/hydration status for malnutrition. Collaborate with interdisciplinary team and initiate plan and interventions as ordered.  Monitor patient's weight and dietary intake as ordered or per policy. Utilize nutrition screening tool and intervene as necessary. Determine patient's food preferences and provide high-protein, high-caloric foods as appropriate.     INTERVENTIONS:  - Monitor oral intake, urinary output, labs, and treatment plans  - Assess nutrition and hydration status and recommend course of action  - Evaluate amount of meals eaten  - Assist patient with eating if necessary   - Allow adequate time for meals  - Recommend/ encourage appropriate diets, oral nutritional supplements, and vitamin/mineral supplements  - Order, calculate, and assess calorie counts as needed  - Recommend, monitor, and adjust tube feedings and TPN/PPN based on assessed needs  - Assess need for intravenous fluids  - Provide specific nutrition/hydration education as appropriate  - Include patient/family/caregiver in decisions related to nutrition  Outcome: Progressing

## 2024-03-21 NOTE — PLAN OF CARE
Problem: PHYSICAL THERAPY ADULT  Goal: Performs mobility at highest level of function for planned discharge setting.  See evaluation for individualized goals.  Description: Treatment/Interventions: Functional transfer training, LE strengthening/ROM, Therapeutic exercise, Endurance training, Equipment eval/education, Patient/family training, Bed mobility, Continued evaluation, Spoke to nursing, Spoke to advanced practitioner, OT          See flowsheet documentation for full assessment, interventions and recommendations.  3/21/2024 0933 by Shawna Melton PT  Note: Prognosis: Fair  Problem List: Decreased strength, Decreased endurance, Impaired balance, Decreased mobility, Decreased cognition, Decreased safety awareness, Pain  Assessment: Pt is 88 y.o. female seen for PT evaluation on 3/21/2024 s/p admit to [facility] on 3/18/2024 w/ Generalized weakness. PT was consulted to assess pt's functional mobility and d/c needs. Order placed for PT eval and tx. unable to obtain reliable PLOF/ home set up information as pt poor historian, CM to follow up. At time of eval, pt requiring mod A x2 for bed mobility and transfers; gait deferred 2* pain. Upon evaluation, pt presenting with impaired functional mobility d/t decreased strength, decreased ROM, decreased endurance, impaired balance, decreased mobility, decreased cognition, decreased safety awareness, pain, and activity intolerance. Pertinent PMHx and current co-morbidities affecting pt's physical performance at time of assessment include: generalized weakness, HTN, CHF, A fib, sacral wound, GERD. Personal factors affecting pt at time of eval include: inaccessible home environment, inability to ambulate household distances, and inability to navigate level surfaces w/o external assistance. The following objective measures performed on IE also reveal limitations: Barthel Index: 20/100, Modified [place]: 4 (moderate/severe disability), and AM-PAC 6-Clicks: 8/24. Pt's  clinical presentation is currently unstable/unpredictable seen in pt's presentation of advanced age, abnormal lab value(s), and ongoing medical assessment. Overall, pt's rehab potential and prognosis to return to PLOF is fair as impacted by objective findings, warranting pt to receive further skilled PT interventions to address identified impairments, activity limitation(s), and participation restriction(s). Pt to benefit from continued PT tx to address deficits as defined above and maximize level of functional independent mobility. From PT/mobility standpoint, recommend level 2, moderate resource intensity in order to facilitate return to PLOF.  Barriers to Discharge: Inaccessible home environment, Decreased caregiver support     Rehab Resource Intensity Level, PT: II (Moderate Resource Intensity)    See flowsheet documentation for full assessment.     3/21/2024 0933 by Shawna Melton PT  Note: Prognosis: Fair  Problem List: Decreased strength, Decreased endurance, Impaired balance, Decreased mobility, Decreased cognition, Decreased safety awareness, Pain  Assessment: Pt is 88 y.o. female seen for PT evaluation on 3/21/2024 s/p admit to [facility] on 3/18/2024 w/ Generalized weakness. PT was consulted to assess pt's functional mobility and d/c needs. Order placed for PT eval and tx. unable to obtain reliable PLOF/ home set up information as pt poor historian, CM to follow up. At time of eval, pt requiring mod A x2 for bed mobility and transfers; gait deferred 2* pain. Upon evaluation, pt presenting with impaired functional mobility d/t decreased strength, decreased ROM, decreased endurance, impaired balance, decreased mobility, decreased cognition, decreased safety awareness, pain, and activity intolerance. Pertinent PMHx and current co-morbidities affecting pt's physical performance at time of assessment include: generalized weakness, HTN, CHF, A fib, sacral wound, GERD. Personal factors affecting pt at time  of eval include: inaccessible home environment, inability to ambulate household distances, and inability to navigate level surfaces w/o external assistance. The following objective measures performed on IE also reveal limitations: Barthel Index: 20/100, Modified [place]: 4 (moderate/severe disability), and AM-PAC 6-Clicks: 8/24. Pt's clinical presentation is currently unstable/unpredictable seen in pt's presentation of advanced age, abnormal lab value(s), and ongoing medical assessment. Overall, pt's rehab potential and prognosis to return to PLOF is fair as impacted by objective findings, warranting pt to receive further skilled PT interventions to address identified impairments, activity limitation(s), and participation restriction(s). Pt to benefit from continued PT tx to address deficits as defined above and maximize level of functional independent mobility. From PT/mobility standpoint, recommend level 2, moderate resource intensity in order to facilitate return to PLOF.  Barriers to Discharge: Inaccessible home environment, Decreased caregiver support     Rehab Resource Intensity Level, PT: II (Moderate Resource Intensity)    See flowsheet documentation for full assessment.

## 2024-03-21 NOTE — PHYSICAL THERAPY NOTE
"Physical Therapy Evaluation     Patient's Name: Cira Iqbal    Admitting Diagnosis  Weakness generalized [R53.1]  Decubitus ulcer [L89.90]  Physical deconditioning [R53.81]  New onset a-fib (HCC) [I48.91]  Ambulatory dysfunction [R26.2]    Problem List  Patient Active Problem List   Diagnosis    Cellulitis of left wrist    Hypertension    GERD (gastroesophageal reflux disease)    Cellulitis of right hand    CHF (congestive heart failure) (HCC)    Generalized weakness    New onset a-fib (HCC)    Sacral wound       Past Medical History  Past Medical History:   Diagnosis Date    CHF (congestive heart failure) (HCC)     Hyperlipidemia     Hypertension     Knee pain     right       Past Surgical History  Past Surgical History:   Procedure Laterality Date    BACK SURGERY      FL GUIDED NEEDLE PLAC BX/ASP/INJ  3/7/2019    KNEE SURGERY          03/21/24 0841   PT Last Visit   PT Visit Date 03/21/24   Note Type   Note type Evaluation   Pain Assessment   Pain Assessment Tool 0-10   Pain Score   (\"alittle\", appears to worsen with mobility)   Pain Location/Orientation Orientation: Right;Location: Knee   Pain Onset/Description Onset: Ongoing;Frequency: Constant/Continuous   Hospital Pain Intervention(s) Repositioned;Ambulation/increased activity  (ELAN Vega and MIGUEL ANGEL Lazcano made aware)   Pain Rating: FLACC (Rest) - Face 1   Pain Rating: FLACC (Rest) - Legs 0   Pain Rating: FLACC (Rest) - Activity 0   Pain Rating: FLACC (Rest) - Cry 1   Pain Rating: FLACC (Rest) - Consolability 1   Score: FLACC (Rest) 3   Pain Rating: FLACC (Activity) - Face 2   Pain Rating: FLACC (Activity) - Legs 2   Pain Rating: FLACC (Activity) - Activity 1   Pain Rating: FLACC (Activity) - Cry 1   Pain Rating: FLACC (Activity) - Consolability 1   Score: FLACC (Activity) 7   Restrictions/Precautions   Weight Bearing Precautions Per Order No   Other Precautions Cognitive;Chair Alarm;Bed Alarm;Fall Risk;Pain  ([place] cyracom used [id number])   Home Living   Home " Equipment Cane   Additional Comments pt reports using cane at baseline   Prior Function   Lives With Son   Falls in the last 6 months   (pt denies fall hx)   Vocational Retired   Comments unable to obtain reliable PLOF/ home set up information as pt poor historian, CM to follow up   General   Family/Caregiver Present No   Cognition   Overall Cognitive Status Impaired   Orientation Level Oriented to person;Disoriented to place;Disoriented to time;Disoriented to situation  (oriented to name but disoriented to birthdate)   Memory Decreased recall of biographical information;Decreased short term memory;Decreased recall of recent events;Decreased recall of precautions   Following Commands Follows one step commands with increased time or repetition   Comments pt agreeable to PT   RLE Assessment   RLE Assessment   (grossly assessed c functional mobility: 3-/5, limited knee flexion AROM; appears limited 2* pain; unable to perform formal MMT given cognitive deficits)   LLE Assessment   LLE Assessment   (grossly assessed c functional mobility 3+/5)   Coordination   Movements are Fluid and Coordinated   (unable to formally assess 2* cognitive deficits, recommend further assessment)   Sensation   (unable to formally assess 2* cognitive deficits, recommend further assessment)   Bed Mobility   Supine to Sit 3  Moderate assistance   Additional items Assist x 2;HOB elevated;Bedrails;Increased time required;Verbal cues;LE management   Additional Comments pt reported ? dizziness following transfer OOB to recliner, BP taken 94/56mmHg   Transfers   Sit to Stand 3  Moderate assistance   Additional items Assist x 2;Increased time required;Verbal cues   Stand to Sit 3  Moderate assistance   Additional items Assist x 2;Increased time required;Verbal cues   Stand pivot 3  Moderate assistance   Additional items Assist x 2;Increased time required;Verbal cues   Balance   Static Sitting Fair -   Dynamic Sitting Poor +   Static Standing Poor    Dynamic Standing Poor -   Endurance Deficit   Endurance Deficit Yes   Activity Tolerance   Activity Tolerance Patient limited by fatigue   Medical Staff Made Aware Pt seen as a co-eval with OT due to the patient's co-morbidities, clinically unstable presentation, and present impairments which are a regression from the patient's baseline. ELAN Vega   Nurse Made Aware RN Jaleesa   Assessment   Prognosis Fair   Problem List Decreased strength;Decreased endurance;Impaired balance;Decreased mobility;Decreased cognition;Decreased safety awareness;Pain   Assessment Pt is 88 y.o. female seen for PT evaluation on 3/21/2024 s/p admit to [facility] on 3/18/2024 w/ Generalized weakness. PT was consulted to assess pt's functional mobility and d/c needs. Order placed for PT eval and tx. unable to obtain reliable PLOF/ home set up information as pt poor historian, CM to follow up. At time of eval, pt requiring mod A x2 for bed mobility and transfers; gait deferred 2* pain. Upon evaluation, pt presenting with impaired functional mobility d/t decreased strength, decreased ROM, decreased endurance, impaired balance, decreased mobility, decreased cognition, decreased safety awareness, pain, and activity intolerance. Pertinent PMHx and current co-morbidities affecting pt's physical performance at time of assessment include: generalized weakness, HTN, CHF, A fib, sacral wound, GERD. Personal factors affecting pt at time of eval include: inaccessible home environment, inability to ambulate household distances, and inability to navigate level surfaces w/o external assistance. The following objective measures performed on IE also reveal limitations: Barthel Index: 20/100, Modified [place]: 4 (moderate/severe disability), and AM-PAC 6-Clicks: 8/24. Pt's clinical presentation is currently unstable/unpredictable seen in pt's presentation of advanced age, abnormal lab value(s), and ongoing medical assessment. Overall, pt's rehab  potential and prognosis to return to PLOF is fair as impacted by objective findings, warranting pt to receive further skilled PT interventions to address identified impairments, activity limitation(s), and participation restriction(s). Pt to benefit from continued PT tx to address deficits as defined above and maximize level of functional independent mobility. From PT/mobility standpoint, recommend level 2, moderate resource intensity in order to facilitate return to PLOF.   Barriers to Discharge Inaccessible home environment;Decreased caregiver support   Goals   Patient Goals none expressed   STG Expiration Date 03/31/24   Short Term Goal #1 In 7-10 days: Increase bilateral LE strength 1/2 grade to facilitate independent mobility, Perform all bed mobility tasks with mod A of 1 to decrease caregiver burden, Perform all transfers with mod A of 1 to improve independence, Increase static and dynamic sitting and static and dynamic standing balance 1/2 grade to decrease risk for falls, Improve Barthel Index score to 35 or greater to facilitate independence, and PT to see and establish goals for ambulation, ambulatory balance, elevations if/ when appropriate   PT Treatment Day 0   Plan   Treatment/Interventions Functional transfer training;LE strengthening/ROM;Therapeutic exercise;Endurance training;Equipment eval/education;Patient/family training;Bed mobility;Continued evaluation;Spoke to nursing;Spoke to advanced practitioner;OT   PT Frequency 3-5x/wk   Discharge Recommendation   Rehab Resource Intensity Level, PT II (Moderate Resource Intensity)   AM-PAC Basic Mobility Inpatient   Turning in Flat Bed Without Bedrails 2   Lying on Back to Sitting on Edge of Flat Bed Without Bedrails 2   Moving Bed to Chair 1   Standing Up From Chair Using Arms 1   Walk in Room 1   Climb 3-5 Stairs With Railing 1   Basic Mobility Inpatient Raw Score 8   Turning Head Towards Sound 3   Follow Simple Instructions 2   Low Function Basic  Mobility Raw Score  13   Low Function Basic Mobility Standardized Score  20.14   [facility] Highest Level Of Mobility   [facility] Goal 3: Sit at edge of bed   -[facility] Achieved 4: Move to chair/commode   Modified [place] Scale   Modified [place] Scale 4   Barthel Index   Feeding 5   Bathing 0   Grooming Score 0   Dressing Score 5   Bladder Score 0   Bowels Score 0   Toilet Use Score 5   Transfers (Bed/Chair) Score 5   Mobility (Level Surface) Score 0   Stairs Score 0   Barthel Index Score 20           Shawna Melton, PT, DPT

## 2024-03-21 NOTE — PLAN OF CARE
Problem: OCCUPATIONAL THERAPY ADULT  Goal: Performs self-care activities at highest level of function for planned discharge setting.  See evaluation for individualized goals.  Description: Treatment Interventions: ADL retraining, Functional transfer training, Endurance training, UE strengthening/ROM, Cognitive reorientation, Patient/family training, Equipment evaluation/education, Compensatory technique education, Activityengagement          See flowsheet documentation for full assessment, interventions and recommendations.   Note: Limitation: Decreased ADL status, Decreased UE strength, Decreased Safe judgement during ADL, Decreased cognition, Decreased endurance, Decreased high-level ADLs, Decreased self-care trans  Prognosis: Good  Assessment: Patient is a 88 y.o. female seen for OT evaluation s/p admit to [facility]  on 3/18/2024 w/Generalized weakness. Commorbidities affecting patient's functional performance at time of assessment include: HTN, CHF, a-fib, and sacral wound. Orders placed for OT evaluation and treatment and up and OOB as tolerated . Performed at least two patient identifiers during session including name and wristband.  Prior to admission, Per chart review, patient requires assistance with ADLs/IADLs and lives with family in a two story house. Personal factors affecting patient at time of initial evaluation include: limited insight into deficits, difficulty performing ADLs, and difficulty performing IADLs. Upon evaluation, patient requires moderate assist for UB ADLs, maximal assist for LB ADLs, transfers and functional ambulation in room and bathroom with moderate assist of 2, with the use of Rolling Walker.  Patient is oriented to name,, disoriented to time,, disoriented to place,, and disoriented to situation, and presents with limited ability to recall information after a brief period of time (short term memory) / working memory .  Occupational performance is affected by the  following deficits: orientation, attention span, decreased muscle strength, dynamic sit/ stand balance deficit with poor standing tolerance time for self care and functional mobility, decreased activity tolerance, impaired memory, impaired problem solving, decreased safety awareness, increased pain, and delayed righting and equilibrium reactions. Patient to benefit from continued Occupational Therapy treatment while in the hospital to address deficits as defined above and maximize level of functional independence with ADLs and functional mobility. Occupational Performance areas to address include: eating, grooming , bathing/ shower, dressing, toilet hygiene, transfer to all surfaces, and functional ambulation. From OT standpoint, recommendation at time of d/c would be Level II (moderate resource intensity).     Rehab Resource Intensity Level, OT: II (Moderate Resource Intensity)     Candy DANGELO, OTR/L

## 2024-03-21 NOTE — CASE MANAGEMENT
Case Management Progress Note    Patient name Cira Iqbal  Location /-01 MRN [id number]  : 1935 Date 3/21/2024       LOS (days): 2  Geometric Mean LOS (GMLOS) (days): 1.8  Days to GMLOS:0        OBJECTIVE:     Current admission status: Inpatient  Preferred Pharmacy:   [facility], [place] - [address] [address]  Phone: [phone] Fax: [phone]    Primary Care Provider: Nely Bustillos MD  Primary Insurance: [employer]  Secondary Insurance: [place] [employer] AND [employer] [facility]    PROGRESS NOTE:  CM received AIDIN message from admissions at [facility].  Patient can admit tomorrow, 3/22.  CM to update all parties and continue to follow for planning.

## 2024-03-21 NOTE — PROGRESS NOTES
[facility]  Progress Note  Name: Cira Iqbal I  MRN: [id number]  Unit/Bed#: -01 I Date of Admission: 3/18/2024   Date of Service: 3/21/2024 I Hospital Day: 2    Assessment/Plan   * Generalized weakness  Assessment & Plan  88-year-old female patient with past medical history of dementia, primarily Belarusian speaking who initially was brought in by family due to generalized weakness worse cognitive function  Lab work reveals some leucocytosis   UA mildly positive will follow up culture  CT without any acute abnormalities  PT/OT eval recommending rehab  Plan to go to [facility] 3/22  Follow-up with case management    CHF (congestive heart failure) ([facility])  Assessment & Plan  Chronic diastolic CHF, in the setting of hypertension, currently euvolemic, required updated echocardiogram to determine type.   Moved from [place]  Echo-The left ventricular ejection fraction is 55%. Systolic function is normal.   Currently appears euvolemic    New onset a-fib ([facility])  Assessment & Plan  Patient was brought by family member due to progressively worsening generalized weakness, cognitive decline.  Noted to be in new onset atrial fibrillation  Currently rate controlled  Was on Lopressor 25 mg twice daily  Given hypotension will decresed to 12.5 BID  Echo-The left ventricular ejection fraction is 55%. Systolic function is normal   Have a discussion with family regarding Eliquis given advanced age/dementia and increased bleeding risks follow-up for antiplatelet therapy  Has Chadsvasc of 5        Sacral wound  Assessment & Plan  Stage 2  Wound care consulted  Follow recomendations  Continue with local wound care    Hypertension  Assessment & Plan  BP currently controlled  Continue metoprolol           VTE Pharmacologic Prophylaxis: VTE Score: 3 Moderate Risk (Score 3-4) - Pharmacological DVT Prophylaxis Ordered: heparin.    Mobility:   Basic Mobility Inpatient Raw Score: 8  -[facility] Goal: 3: Sit at edge of bed  [facility]  Achieved: 4: Move to chair/commode  -[facility] Goal achieved. Continue to encourage appropriate mobility.    Patient Centered Rounds: I performed bedside rounds with nursing staff today.   Discussions with Specialists or Other Care Team Provider: Discussed with case management    Education and Discussions with Family / Patient: Discussed with patient and family    Total Time Spent on Date of Encounter in care of patient: 35 mins. This time was spent on one or more of the following: performing physical exam; counseling and coordination of care; obtaining or reviewing history; documenting in the medical record; reviewing/ordering tests, medications or procedures; communicating with other healthcare professionals and discussing with patient's family/caregivers.    Current Length of Stay: 2 day(s)  Current Patient Status: Inpatient   Certification Statement: The patient will continue to require additional inpatient hospital stay due to monitoring of mentation, better pain control, transfer to [facility] tomorrow  Discharge Plan: Anticipate discharge tomorrow to rehab facility.    Code Status: Level 3 - DNAR and DNI    Subjective:   Patient is out of bed in the chair appears comfortable was complaining of right knee pain likely arthritic in nature xray of hip shows arthritidis so likely the same    Objective:     Vitals:   Temp (24hrs), Av.7 °F (36.5 °C), Min:97.4 °F (36.3 °C), Max:98.2 °F (36.8 °C)    Temp:  [97.4 °F (36.3 °C)-98.2 °F (36.8 °C)] 97.6 °F (36.4 °C)  HR:  [] 82  Resp:  [18] 18  BP: ()/(56-75) 94/62  SpO2:  [98 %-100 %] 100 %  Body mass index is 30.66 kg/m².     Input and Output Summary (last 24 hours):     Intake/Output Summary (Last 24 hours) at 3/21/2024 1131  Last data filed at 3/21/2024 0934  Gross per 24 hour   Intake 420 ml   Output --   Net 420 ml       Physical Exam:   Physical Exam  Vitals reviewed.   Constitutional:       General: She is not in acute distress.      Appearance: She is not ill-appearing.   Cardiovascular:      Rate and Rhythm: Normal rate.   Pulmonary:      Effort: No respiratory distress.   Musculoskeletal:         General: Tenderness present.   Skin:     General: Skin is warm.      Coloration: Skin is pale.   Neurological:      Mental Status: She is alert. Mental status is at baseline.   Psychiatric:         Mood and Affect: Mood normal.        Additional Data:     Labs:  Results from last 7 days   Lab Units 03/21/24  0607   WBC Thousand/uL 12.41*   HEMOGLOBIN g/dL 12.6   HEMATOCRIT % 38.5   PLATELETS Thousands/uL 321   NEUTROS PCT % 76*   LYMPHS PCT % 14   MONOS PCT % 9   EOS PCT % 0     Results from last 7 days   Lab Units 03/21/24  0607 03/19/24  0311 03/18/24  1546   SODIUM mmol/L 139   < > 139   POTASSIUM mmol/L 3.7   < > 4.3   CHLORIDE mmol/L 102   < > 104   CO2 mmol/L 30   < > 26   BUN mg/dL 24   < > 18   CREATININE mg/dL 0.96   < > 1.00   ANION GAP mmol/L 7   < > 9   CALCIUM mg/dL 9.1   < > 9.5   ALBUMIN g/dL  --   --  3.9   TOTAL BILIRUBIN mg/dL  --   --  0.83   ALK PHOS U/L  --   --  107*   ALT U/L  --   --  10   AST U/L  --   --  21   GLUCOSE RANDOM mg/dL 107   < > 93    < > = values in this interval not displayed.     Results from last 7 days   Lab Units 03/18/24  1546   INR  1.06             Results from last 7 days   Lab Units 03/18/24  1546   LACTIC ACID mmol/L 1.2       Lines/Drains:  Invasive Devices       Peripheral Intravenous Line  Duration             Peripheral IV Left Antecubital -- days                        Recent Cultures (last 7 days):   Results from last 7 days   Lab Units 03/18/24  1552 03/18/24  1546   BLOOD CULTURE  No Growth at 48 hrs. No Growth at 48 hrs.       Last 24 Hours Medication List:   Current Facility-Administered Medications   Medication Dose Route Frequency Provider Last Rate    aspirin  81 mg Oral Daily Boone RODRIGUEZ MD      atorvastatin  10 mg Oral Daily Boone RODRIGUEZ MD      clopidogrel  75 mg Oral Daily  Boone RODRIGUEZ MD      Diclofenac Sodium  2 g Topical 4x Daily ELAN Gupta      heparin (porcine)  5,000 Units Subcutaneous Q8H GERMÁN Boone RODRIGUEZ MD      lidocaine  1 patch Topical Daily Peter Melgoza MD      magnesium Oxide  400 mg Oral BID ELAN Gupta      melatonin  12 mg Oral HS Boone RODRIGUEZ MD      metoprolol tartrate  12.5 mg Oral Q12H GERMÁN ELAN Gupta      oxyCODONE  2.5 mg Oral Q8H PRN Peter Melgoza MD      traZODone  100 mg Oral HS Boone RODRIGUEZ MD          Today, Patient Was Seen By: ELAN Gupta    **Please Note: This note may have been constructed using a voice recognition system.**

## 2024-03-22 VITALS
OXYGEN SATURATION: 100 % | HEIGHT: 60 IN | HEART RATE: 85 BPM | TEMPERATURE: 98 F | DIASTOLIC BLOOD PRESSURE: 45 MMHG | RESPIRATION RATE: 16 BRPM | WEIGHT: 157 LBS | SYSTOLIC BLOOD PRESSURE: 153 MMHG | BODY MASS INDEX: 30.82 KG/M2

## 2024-03-22 PROBLEM — L89.92 PRESSURE INJURY, STAGE 2 (HCC): Status: ACTIVE | Noted: 2024-03-18

## 2024-03-22 LAB
ANION GAP SERPL CALCULATED.3IONS-SCNC: 4 MMOL/L (ref 4–13)
BACTERIA UR CULT: NORMAL
BASOPHILS # BLD AUTO: 0.04 THOUSANDS/ÂΜL (ref 0–0.1)
BASOPHILS NFR BLD AUTO: 0 % (ref 0–1)
BUN SERPL-MCNC: 25 MG/DL (ref 5–25)
CALCIUM SERPL-MCNC: 8.7 MG/DL (ref 8.4–10.2)
CHLORIDE SERPL-SCNC: 106 MMOL/L (ref 96–108)
CO2 SERPL-SCNC: 30 MMOL/L (ref 21–32)
CREAT SERPL-MCNC: 0.92 MG/DL (ref 0.6–1.3)
EOSINOPHIL # BLD AUTO: 0.11 THOUSAND/ÂΜL (ref 0–0.61)
EOSINOPHIL NFR BLD AUTO: 1 % (ref 0–6)
ERYTHROCYTE [DISTWIDTH] IN BLOOD BY AUTOMATED COUNT: 13.5 % (ref 11.6–15.1)
GFR SERPL CREATININE-BSD FRML MDRD: 56 ML/MIN/1.73SQ M
GLUCOSE SERPL-MCNC: 91 MG/DL (ref 65–140)
HCT VFR BLD AUTO: 33.9 % (ref 34.8–46.1)
HGB BLD-MCNC: 11 G/DL (ref 11.5–15.4)
IMM GRANULOCYTES # BLD AUTO: 0.03 THOUSAND/UL (ref 0–0.2)
IMM GRANULOCYTES NFR BLD AUTO: 0 % (ref 0–2)
LYMPHOCYTES # BLD AUTO: 2.43 THOUSANDS/ÂΜL (ref 0.6–4.47)
LYMPHOCYTES NFR BLD AUTO: 25 % (ref 14–44)
MAGNESIUM SERPL-MCNC: 1.9 MG/DL (ref 1.9–2.7)
MCH RBC QN AUTO: 31 PG (ref 26.8–34.3)
MCHC RBC AUTO-ENTMCNC: 32.4 G/DL (ref 31.4–37.4)
MCV RBC AUTO: 96 FL (ref 82–98)
MONOCYTES # BLD AUTO: 0.8 THOUSAND/ÂΜL (ref 0.17–1.22)
MONOCYTES NFR BLD AUTO: 8 % (ref 4–12)
NEUTROPHILS # BLD AUTO: 6.21 THOUSANDS/ÂΜL (ref 1.85–7.62)
NEUTS SEG NFR BLD AUTO: 66 % (ref 43–75)
NRBC BLD AUTO-RTO: 0 /100 WBCS
PLATELET # BLD AUTO: 331 THOUSANDS/UL (ref 149–390)
PMV BLD AUTO: 9.6 FL (ref 8.9–12.7)
POTASSIUM SERPL-SCNC: 3.6 MMOL/L (ref 3.5–5.3)
RBC # BLD AUTO: 3.55 MILLION/UL (ref 3.81–5.12)
SODIUM SERPL-SCNC: 140 MMOL/L (ref 135–147)
WBC # BLD AUTO: 9.62 THOUSAND/UL (ref 4.31–10.16)

## 2024-03-22 PROCEDURE — 85025 COMPLETE CBC W/AUTO DIFF WBC: CPT | Performed by: INTERNAL MEDICINE

## 2024-03-22 PROCEDURE — 83735 ASSAY OF MAGNESIUM: CPT | Performed by: NURSE PRACTITIONER

## 2024-03-22 PROCEDURE — 80048 BASIC METABOLIC PNL TOTAL CA: CPT | Performed by: INTERNAL MEDICINE

## 2024-03-22 PROCEDURE — 99239 HOSP IP/OBS DSCHRG MGMT >30: CPT | Performed by: NURSE PRACTITIONER

## 2024-03-22 RX ORDER — NYSTATIN 100000 [USP'U]/G
POWDER TOPICAL 2 TIMES DAILY
Qty: 30 G | Refills: 0
Start: 2024-03-22

## 2024-03-22 RX ORDER — OXYCODONE HYDROCHLORIDE 5 MG/1
2.5 TABLET ORAL EVERY 8 HOURS PRN
Qty: 3 TABLET | Refills: 0 | Status: SHIPPED | OUTPATIENT
Start: 2024-03-22

## 2024-03-22 RX ORDER — NYSTATIN 100000 [USP'U]/G
POWDER TOPICAL 2 TIMES DAILY
Status: DISCONTINUED | OUTPATIENT
Start: 2024-03-22 | End: 2024-03-22 | Stop reason: HOSPADM

## 2024-03-22 RX ADMIN — DICLOFENAC SODIUM 2 G: 10 GEL TOPICAL at 09:17

## 2024-03-22 RX ADMIN — APIXABAN 2.5 MG: 2.5 TABLET, FILM COATED ORAL at 17:47

## 2024-03-22 RX ADMIN — LIDOCAINE 5% 1 PATCH: 700 PATCH TOPICAL at 09:12

## 2024-03-22 RX ADMIN — DICLOFENAC SODIUM 2 G: 10 GEL TOPICAL at 17:47

## 2024-03-22 RX ADMIN — APIXABAN 2.5 MG: 2.5 TABLET, FILM COATED ORAL at 11:18

## 2024-03-22 RX ADMIN — HEPARIN SODIUM 5000 UNITS: 5000 INJECTION INTRAVENOUS; SUBCUTANEOUS at 06:06

## 2024-03-22 RX ADMIN — NYSTATIN: 100000 POWDER TOPICAL at 15:47

## 2024-03-22 RX ADMIN — ATORVASTATIN CALCIUM 10 MG: 10 TABLET, FILM COATED ORAL at 09:12

## 2024-03-22 RX ADMIN — ASPIRIN 81 MG: 81 TABLET, CHEWABLE ORAL at 09:12

## 2024-03-22 RX ADMIN — Medication 12.5 MG: at 09:11

## 2024-03-22 RX ADMIN — CLOPIDOGREL 75 MG: 75 TABLET ORAL at 09:11

## 2024-03-22 RX ADMIN — Medication 400 MG: at 09:12

## 2024-03-22 RX ADMIN — DICLOFENAC SODIUM 2 G: 10 GEL TOPICAL at 11:17

## 2024-03-22 RX ADMIN — NYSTATIN: 100000 POWDER TOPICAL at 17:47

## 2024-03-22 NOTE — CASE MANAGEMENT
Case Management Discharge Planning Note    Patient name Cira Iqbal  Location /-01 MRN [id number]  : 1935 Date 3/22/2024       Current Admission Date: 3/18/2024  Current Admission Diagnosis:Generalized weakness   Patient Active Problem List    Diagnosis Date Noted    Generalized weakness 2024    New onset a-fib (HCC) 2024    Sacral wound 2024    Cellulitis of right hand 2020    Cellulitis of left wrist 2020    Hypertension 2020    GERD (gastroesophageal reflux disease) 2020    CHF (congestive heart failure) (HCC) 2017      LOS (days): 3  Geometric Mean LOS (GMLOS) (days): 2.3  Days to GMLOS:-0.5     OBJECTIVE:  Risk of Unplanned Readmission Score: 9.72      Current admission status: Inpatient   Preferred Pharmacy:   [facility] AT [facility], [place] - [address] [address]  Phone: [phone] Fax: [phone]    Primary Care Provider: Nely Bustillos MD  Primary Insurance: [employer] REP  Secondary Insurance: [employer] AND [employer] [facility]    DISCHARGE DETAILS:    Discharge planning discussed with:: DIL via phone.  Freedom of Choice: Yes  Comments - Freedom of Choice: FOC maintained - CM provided update on d/c status.  Patient anticipated for d/c today, however, now needs insurance auth to admit STR to LTC.  CM taked to DCS with PT/OT notes from yesterday.  Patient will d/c once auth is obtained - today vs tomorrow.  Family in agreement with plan.  CM will maintain communication.  CM contacted family/caregiver?: Yes  Were Treatment Team discharge recommendations reviewed with patient/caregiver?: Yes (As it pertains to d/c planning and CM role.)  Did patient/caregiver verbalize understanding of patient care needs?: N/A- going to facility  Were patient/caregiver advised of the risks associated with not following Treatment Team discharge recommendations?: Yes (As it pertains to  d/c planning and CM role.)    Contacts  Patient Contacts: ADRIENNE Bower  Relationship to Patient:: Family  Contact Method: Phone  Phone Number: [phone]  Reason/Outcome: Continuity of Care, Discharge Planning    Requested Home Health Care         Is the patient interested in HHC at discharge?: No    DME Referral Provided  Referral made for DME?: No    Other Referral/Resources/Interventions Provided:  Interventions: SNF  Referral Comments: Auth tasked to DCS for STR to LTC at [facility].    Treatment Team Recommendation: SNF  Discharge Destination Plan:: SNF    Accepting Facility Name, City & State : [place] [address]  Receiving Facility/Agency Phone Number: Phone: [phone]  Facility/Agency Fax Number: Fax: [phone]

## 2024-03-22 NOTE — CASE MANAGEMENT
Case Management Progress Note    Patient name Cira Iqbal  Location /-01 MRN [id number]  : 1935 Date 3/22/2024       LOS (days): 3  Geometric Mean LOS (GMLOS) (days): 2.3  Days to GMLOS:-0.5        OBJECTIVE:     Current admission status: Inpatient  Preferred Pharmacy:   [facility] AT [facility], [address] [address]  Phone: [phone] Fax: [phone]    Primary Care Provider: Nely Bustillos MD  Primary Insurance: [employer] REP  Secondary Insurance: [place] [employer] AND [employer] [facility]    PROGRESS NOTE:  MA51 sent to [facility] via [employer].  LOC/NFCE packet faxed to [facility] at [phone]. Patient anticipated for d/c today.

## 2024-03-22 NOTE — ASSESSMENT & PLAN NOTE
Patient was brought by family member due to progressively worsening generalized weakness, cognitive decline.  Noted to be in new onset atrial fibrillation  Currently rate controlled  Was on Lopressor 25 mg twice daily  Given hypotension will decresed to 12.5 BID  Blood pressures been better  Echo-The left ventricular ejection fraction is 55%. Systolic function is normal   Have a discussion with family regarding Eliquis given advanced age/dementia and increased bleeding risks follow-up for antiplatelet therapy  Has Chadsvasc of 5  Discussed at length with patient's family.  Patient is been off Plavix for approximately 2 years prior to moving in with family so discontinued Plavix.  Also discontinued aspirin and started patient on Eliquis in favor of using only Eliquis to decrease the risk of bleeding and manage stroke risk related to new onset A-fib patient family is agreeable to this plan

## 2024-03-22 NOTE — ASSESSMENT & PLAN NOTE
BP currently controlled  Was rather low so decreased metoprolol to 12.5 mg twice daily and blood pressure has been better

## 2024-03-22 NOTE — ASSESSMENT & PLAN NOTE
88-year-old female patient with past medical history of dementia, primarily Turkmen speaking who initially was brought in by family due to generalized weakness worse cognitive function  Lab work reveals some leucocytosis   UA mildly positive no symptoms, and culture reveals mixed contaminants  CT without any acute abnormalities  PT/OT eval recommending rehab  Plan to go to [facility] 3/22  Follow-up with case management

## 2024-03-22 NOTE — CASE MANAGEMENT
ME Support Center received request for authorization from Care Manager.  Authorization request for: SNF  Facility Name: [facility] NPI: [id number]   Facility MD: Dr. Aldana NPI: [id number]  Authorization initiated by contacting insurance: [employer]   Via: Fax  Clinicals submitted via: fax # [phone]    Care Manager notified: Juan Rai

## 2024-03-22 NOTE — PLAN OF CARE
Problem: Potential for Falls  Goal: Patient will remain free of falls  Description: INTERVENTIONS:  - Educate patient/family on patient safety including physical limitations  - Instruct patient to call for assistance with activity   - Consult OT/PT to assist with strengthening/mobility   - Keep Call bell within reach  - Keep bed low and locked with side rails adjusted as appropriate  - Keep care items and personal belongings within reach  - Initiate and maintain comfort rounds  - Make Fall Risk Sign visible to staff  - Offer Toileting every 3 Hours, in advance of need  - Initiate/Maintain bed alarm  - Obtain necessary fall risk management equipment:   - Apply yellow socks and bracelet for high fall risk patients  - Consider moving patient to room near nurses station  Outcome: Progressing     Problem: PAIN - ADULT  Goal: Verbalizes/displays adequate comfort level or baseline comfort level  Description: Interventions:  - Encourage patient to monitor pain and request assistance  - Assess pain using appropriate pain scale  - Administer analgesics based on type and severity of pain and evaluate response  - Implement non-pharmacological measures as appropriate and evaluate response  - Consider cultural and social influences on pain and pain management  - Notify physician/advanced practitioner if interventions unsuccessful or patient reports new pain  Outcome: Progressing     Problem: INFECTION - ADULT  Goal: Absence or prevention of progression during hospitalization  Description: INTERVENTIONS:  - Assess and monitor for signs and symptoms of infection  - Monitor lab/diagnostic results  - Monitor all insertion sites, i.e. indwelling lines, tubes, and drains  - Monitor endotracheal if appropriate and nasal secretions for changes in amount and color  - [place] appropriate cooling/warming therapies per order  - Administer medications as ordered  - Instruct and encourage patient and family to use good hand hygiene  technique  - Identify and instruct in appropriate isolation precautions for identified infection/condition  Outcome: Progressing  Goal: Absence of fever/infection during neutropenic period  Description: INTERVENTIONS:  - Monitor WBC    Outcome: Progressing     Problem: SAFETY ADULT  Goal: Patient will remain free of falls  Description: INTERVENTIONS:  - Educate patient/family on patient safety including physical limitations  - Instruct patient to call for assistance with activity   - Consult OT/PT to assist with strengthening/mobility   - Keep Call bell within reach  - Keep bed low and locked with side rails adjusted as appropriate  - Keep care items and personal belongings within reach  - Initiate and maintain comfort rounds  - Make Fall Risk Sign visible to staff  - Offer Toileting every 3 Hours, in advance of need  - Initiate/Maintain bed alarm  - Obtain necessary fall risk management equipment:   - Apply yellow socks and bracelet for high fall risk patients  - Consider moving patient to room near nurses station  Outcome: Progressing  Goal: Maintain or return to baseline ADL function  Description: INTERVENTIONS:  -  Assess patient's ability to carry out ADLs; assess patient's baseline for ADL function and identify physical deficits which impact ability to perform ADLs (bathing, care of mouth/teeth, toileting, grooming, dressing, etc.)  - Assess/evaluate cause of self-care deficits   - Assess range of motion  - Assess patient's mobility; develop plan if impaired  - Assess patient's need for assistive devices and provide as appropriate  - Encourage maximum independence but intervene and supervise when necessary  - Involve family in performance of ADLs  - Assess for home care needs following discharge   - Consider OT consult to assist with ADL evaluation and planning for discharge  - Provide patient education as appropriate  Outcome: Progressing  Goal: Maintains/Returns to pre admission functional  level  Description: INTERVENTIONS:  - Perform AM-PAC 6 Click Basic Mobility/ Daily Activity assessment daily.  - Set and communicate daily mobility goal to care team and patient/family/caregiver.   - Collaborate with rehabilitation services on mobility goals if consulted  - Perform Range of Motion 3 times a day.  - Reposition patient every 2 hours.  - Dangle patient 3 times a day  - Stand patient 3 times a day  - Ambulate patient 3 times a day  - Out of bed to chair 3 times a day   - Out of bed for meals 3 times a day  - Out of bed for toileting  - Record patient progress and toleration of activity level   Outcome: Progressing     Problem: DISCHARGE PLANNING  Goal: Discharge to home or other facility with appropriate resources  Description: INTERVENTIONS:  - Identify barriers to discharge w/patient and caregiver  - Arrange for needed discharge resources and transportation as appropriate  - Identify discharge learning needs (meds, wound care, etc.)  - Arrange for interpretive services to assist at discharge as needed  - Refer to Case Management Department for coordinating discharge planning if the patient needs post-hospital services based on physician/advanced practitioner order or complex needs related to functional status, cognitive ability, or social support system  Outcome: Progressing     Problem: Knowledge Deficit  Goal: Patient/family/caregiver demonstrates understanding of disease process, treatment plan, medications, and discharge instructions  Description: Complete learning assessment and assess knowledge base.  Interventions:  - Provide teaching at level of understanding  - Provide teaching via preferred learning methods  Outcome: Progressing     Problem: Prexisting or High Potential for Compromised Skin Integrity  Goal: Skin integrity is maintained or improved  Description: INTERVENTIONS:  - Identify patients at risk for skin breakdown  - Assess and monitor skin integrity  - Assess and monitor nutrition  and hydration status  - Monitor labs   - Assess for incontinence   - Turn and reposition patient  - Assist with mobility/ambulation  - Relieve pressure over bony prominences  - Avoid friction and shearing  - Provide appropriate hygiene as needed including keeping skin clean and dry  - Evaluate need for skin moisturizer/barrier cream  - Collaborate with interdisciplinary team   - Patient/family teaching  - Consider wound care consult   Outcome: Progressing     Problem: Nutrition/Hydration-ADULT  Goal: Nutrient/Hydration intake appropriate for improving, restoring or maintaining nutritional needs  Description: Monitor and assess patient's nutrition/hydration status for malnutrition. Collaborate with interdisciplinary team and initiate plan and interventions as ordered.  Monitor patient's weight and dietary intake as ordered or per policy. Utilize nutrition screening tool and intervene as necessary. Determine patient's food preferences and provide high-protein, high-caloric foods as appropriate.     INTERVENTIONS:  - Monitor oral intake, urinary output, labs, and treatment plans  - Assess nutrition and hydration status and recommend course of action  - Evaluate amount of meals eaten  - Assist patient with eating if necessary   - Allow adequate time for meals  - Recommend/ encourage appropriate diets, oral nutritional supplements, and vitamin/mineral supplements  - Order, calculate, and assess calorie counts as needed  - Recommend, monitor, and adjust tube feedings and TPN/PPN based on assessed needs  - Assess need for intravenous fluids  - Provide specific nutrition/hydration education as appropriate  - Include patient/family/caregiver in decisions related to nutrition  Outcome: Progressing

## 2024-03-22 NOTE — DISCHARGE SUMMARY
[facility]  Discharge- Cira Iqbal 1935, 88 y.o. female MRN: [id number]  Unit/Bed#: -Erik Encounter: [id number]  Primary Care Provider: Nely Bustillos MD   Date and time admitted to hospital: 3/18/2024  3:01 PM    * Generalized weakness  Assessment & Plan  88-year-old female patient with past medical history of dementia, primarily Turkish speaking who initially was brought in by family due to generalized weakness worse cognitive function  Lab work reveals some leucocytosis   UA mildly positive no symptoms, and culture reveals mixed contaminants  CT without any acute abnormalities  PT/OT eval recommending rehab  Plan to go to [facility] 3/22  Follow-up with case management    CHF (congestive heart failure) (Formerly [facility])  Assessment & Plan  Chronic diastolic CHF, in the setting of hypertension, currently euvolemic, required updated echocardiogram to determine type.   Moved from [place]  Echo-The left ventricular ejection fraction is 55%. Systolic function is normal.   Currently appears euvolemic    New onset a-fib ([facility])  Assessment & Plan  Patient was brought by family member due to progressively worsening generalized weakness, cognitive decline.  Noted to be in new onset atrial fibrillation  Currently rate controlled  Was on Lopressor 25 mg twice daily  Given hypotension will decresed to 12.5 BID  Blood pressures been better  Echo-The left ventricular ejection fraction is 55%. Systolic function is normal   Have a discussion with family regarding Eliquis given advanced age/dementia and increased bleeding risks follow-up for antiplatelet therapy  Has Chadsvasc of 5  Discussed at length with patient's family.  Patient is been off Plavix for approximately 2 years prior to moving in with family so discontinued Plavix.  Also discontinued aspirin and started patient on Eliquis in favor of using only Eliquis to decrease the risk of bleeding and manage stroke risk related to new onset A-fib patient  family is agreeable to this plan      Pressure injury, stage 2 (HCC)  Assessment & Plan  Stage 2  Wound care consulted  Follow recomendations  Continue with local wound care    Hypertension  Assessment & Plan  BP currently controlled  Was rather low so decreased metoprolol to 12.5 mg twice daily and blood pressure has been better     Discharging Physician / Practitioner: ELAN Gupta  PCP: Nely Bustillos MD  Admission Date:   Admission Orders (From admission, onward)       Ordered        03/19/24 1435  Inpatient Admission  Once            03/18/24 1748  Place in Observation  Once                          Discharge Date: 03/22/24    Medical Problems       Resolved Problems  Date Reviewed: 3/19/2024   None         Consultations During Hospital Stay:  None    Procedures Performed:   XR hips bilateral 2 vw w pelvis if performed    Result Date: 3/20/2024  Narrative: XR HIPS BILATERAL 2 VW W PELVIS IF PERFORMED INDICATION: poor historian, c/o hip pains, not willing to get out of bed per family.. COMPARISON: 12/29/2017 FINDINGS: Intact bony pelvis. Marked chronic degenerative changes are noted in the visualized portion of the lower lumbar spine. There is a calcification projecting over the left iliac wing. There are multiple eggshell type high density structures projecting over the right mid to lower abdomen, stable when compared with prior study and likely of no clinical significance. LEFT HIP: No acute fracture or dislocation. Moderate osteoarthritis noted with prominent osteophytic spurring at the femoral head margin and some joint narrowing. No lytic or blastic osseous lesion. RIGHT HIP: No acute fracture or dislocation. Severe osteoarthritis with joint narrowing, sclerosis, and bulky osteophytic spurring at the joint margin. No lytic or blastic osseous lesion.     Impression: Arthritis of the hips and lower lumbar spine. Workstation performed: [id number]     Echo complete w/ contrast if indicated    Result  Date: 3/19/2024  Narrative:   Left Ventricle: Left ventricular cavity size is normal. Wall thickness is normal. The left ventricular ejection fraction is 55%. Systolic function is normal. Wall motion is normal. Diastolic function is normal.   Left Atrium: The atrium is moderately dilated.   Aortic Valve: The aortic valve is trileaflet. The leaflets are moderately thickened. The leaflets are not calcified. The leaflets exhibit normal mobility. There is mild regurgitation. There is mild stenosis. The aortic valve peak gradient is 12 mmHg. The aortic valve mean gradient is 6 mmHg.   Mitral Valve: There is mild regurgitation.   Tricuspid Valve: There is moderate to severe regurgitation.     XR chest portable    Result Date: 3/19/2024  Narrative: XR CHEST PORTABLE INDICATION: elevated WBC. COMPARISON: None FINDINGS: Clear lungs. No pneumothorax or pleural effusion. Right hemidiaphragm elevation with interposed colon between diaphragm and liver. Normal cardiomediastinal silhouette. Bones are unremarkable for age. Normal upper abdomen.     Impression: No acute cardiopulmonary disease. Workstation performed: [id number]     CT head without contrast    Result Date: 3/18/2024  Narrative: CT BRAIN - WITHOUT CONTRAST INDICATION:   confusion. COMPARISON: 3/18/2019. TECHNIQUE:  CT examination of the brain was performed.  Multiplanar 2D reformatted images were created from the source data. Radiation dose length product (DLP) for this visit:  869 mGy-cm .  This examination, like all CT scans performed in the [facility], was performed utilizing techniques to minimize radiation dose exposure, including the use of iterative reconstruction and automated exposure control. IMAGE QUALITY:  Diagnostic. FINDINGS: PARENCHYMA:  No intracranial mass, mass effect or midline shift. No CT signs of acute infarction.  No acute parenchymal hemorrhage. Stable scattered punctate calcifications. Mild chronic microvascular ischemic  "changes. VENTRICLES AND EXTRA-AXIAL SPACES: Mildly dilated compatible with mild atrophy. VISUALIZED ORBITS: Stable bilateral posterolateral calcifications along the globes. PARANASAL SINUSES: Polyp or retention cyst seen inferiorly in the left maxillary sinus. Mild mucosal thickening of the ethmoid sinuses. The mastoid air cells are clear. CALVARIUM AND EXTRACRANIAL SOFT TISSUES:  Normal.     Impression: No acute intracranial abnormality. Stable findings as above. Workstation performed: [id number]         Significant Findings / Test Results:   See above    Incidental Findings:   See above    Test Results Pending at Discharge (will require follow up):   None     Outpatient Tests Requested:  None    Complications: None    Past Medical History:   Diagnosis Date    CHF (congestive heart failure) (HCC)     Hyperlipidemia     Hypertension     Knee pain     right       Reason for Admission: Weakness - Generalized (Per family pt seems \"pff, pt has dementia, per family pt has a bedsore and state that pt is c/o pain and does not want to move around much, they are concern that something else may be going on, pt is primarily Mosotho speaking)       Hospital Course:     Cira Iqbal is a 88 y.o. female patient with past medical history of see above who originally presented to the hospital on 3/18/2024 due to Weakness - Generalized (Per family pt seems \"pff, pt has dementia, per family pt has a bedsore and state that pt is c/o pain and does not want to move around much, they are concern that something else may be going on, pt is primarily Mosotho speaking) patient came in with some worsening generalized weakness was found to be in new onset A-fib, infectious workup largely negative started on beta-blocker and anticoagulation tolerated therapy well hypotension improved with decreased dose of metoprolol and heart rates have since been controlled denies any chest pain chest tightness shortness of breath or difficulty breathing " patient and family are agreeable to go to [facility] for rehab and then likely pursue long-term care at this time    Please see above list of diagnoses and related plan for additional information.     Condition at Discharge: stable     Discharge Day Visit / Exam:     Subjective: Offers no complaints  Vitals: Blood Pressure: 111/57 (03/22/24 0916)  Pulse: 82 (03/22/24 0916)  Temperature: (!) 97.4 °F (36.3 °C) (03/22/24 0726)  Temp Source: Axillary (03/21/24 2214)  Respirations: 18 (03/22/24 0726)  Height: 5' (152.4 cm) (03/19/24 0825)  Weight - Scale: 71.2 kg (157 lb) (03/19/24 0825)  SpO2: 98 % (03/22/24 0916)  Exam:   Physical Exam  Vitals reviewed.   Constitutional:       General: She is not in acute distress.     Appearance: She is ill-appearing.   Cardiovascular:      Rate and Rhythm: Normal rate.   Pulmonary:      Effort: No respiratory distress.   Abdominal:      Palpations: Abdomen is soft.   Musculoskeletal:         General: No swelling.   Skin:     General: Skin is warm.      Coloration: Skin is pale.   Neurological:      Mental Status: She is alert. Mental status is at baseline.   Psychiatric:         Mood and Affect: Mood normal.       Discussion with Family: Discussed with daughter-in-law    Discharge instructions/Information to patient and family:   See after visit summary for information provided to patient and family.      Provisions for Follow-Up Care:  See after visit summary for information related to follow-up care and any pertinent home health orders.      Disposition:     Assisted Living Facility at [facility]    Planned Readmission: no     Discharge Statement:  I spent 45 minutes discharging the patient. This time was spent on the day of discharge. I had direct contact with the patient on the day of discharge. Greater than 50% of the total time was spent examining patient, answering all patient questions, arranging and discussing plan of care with patient as well as  directly providing post-discharge instructions.  Additional time then spent on discharge activities.    Discharge Medications:  See after visit summary for reconciled discharge medications provided to patient and family.      ** Please Note: This note has been constructed using a voice recognition system **

## 2024-03-22 NOTE — PLAN OF CARE
Problem: Potential for Falls  Goal: Patient will remain free of falls  Description: INTERVENTIONS:  - Educate patient/family on patient safety including physical limitations  - Instruct patient to call for assistance with activity   - Consult OT/PT to assist with strengthening/mobility   - Keep Call bell within reach  - Keep bed low and locked with side rails adjusted as appropriate  - Keep care items and personal belongings within reach  - Initiate and maintain comfort rounds  - Make Fall Risk Sign visible to staff  - Offer Toileting every 2 Hours, in advance of need  - Initiate/Maintain bed alarm  - Obtain necessary fall risk management equipment  - Apply yellow socks and bracelet for high fall risk patients  - Consider moving patient to room near nurses station  Outcome: Progressing     Problem: PAIN - ADULT  Goal: Verbalizes/displays adequate comfort level or baseline comfort level  Description: Interventions:  - Encourage patient to monitor pain and request assistance  - Assess pain using appropriate pain scale  - Administer analgesics based on type and severity of pain and evaluate response  - Implement non-pharmacological measures as appropriate and evaluate response  - Consider cultural and social influences on pain and pain management  - Notify physician/advanced practitioner if interventions unsuccessful or patient reports new pain  Outcome: Progressing     Problem: INFECTION - ADULT  Goal: Absence or prevention of progression during hospitalization  Description: INTERVENTIONS:  - Assess and monitor for signs and symptoms of infection  - Monitor lab/diagnostic results  - Monitor all insertion sites, i.e. indwelling lines, tubes, and drains  - Monitor endotracheal if appropriate and nasal secretions for changes in amount and color  - [place] appropriate cooling/warming therapies per order  - Administer medications as ordered  - Instruct and encourage patient and family to use good hand hygiene  technique  - Identify and instruct in appropriate isolation precautions for identified infection/condition  Outcome: Progressing  Goal: Absence of fever/infection during neutropenic period  Description: INTERVENTIONS:  - Monitor WBC    Outcome: Progressing     Problem: SAFETY ADULT  Goal: Patient will remain free of falls  Description: INTERVENTIONS:  - Educate patient/family on patient safety including physical limitations  - Instruct patient to call for assistance with activity   - Consult OT/PT to assist with strengthening/mobility   - Keep Call bell within reach  - Keep bed low and locked with side rails adjusted as appropriate  - Keep care items and personal belongings within reach  - Initiate and maintain comfort rounds  - Make Fall Risk Sign visible to staff  - Offer Toileting every 2 Hours, in advance of need  - Initiate/Maintain bed alarm  - Obtain necessary fall risk management equipment  - Apply yellow socks and bracelet for high fall risk patients  - Consider moving patient to room near nurses station  Outcome: Progressing  Goal: Maintain or return to baseline ADL function  Description: INTERVENTIONS:  -  Assess patient's ability to carry out ADLs; assess patient's baseline for ADL function and identify physical deficits which impact ability to perform ADLs (bathing, care of mouth/teeth, toileting, grooming, dressing, etc.)  - Assess/evaluate cause of self-care deficits   - Assess range of motion  - Assess patient's mobility; develop plan if impaired  - Assess patient's need for assistive devices and provide as appropriate  - Encourage maximum independence but intervene and supervise when necessary  - Involve family in performance of ADLs  - Assess for home care needs following discharge   - Consider OT consult to assist with ADL evaluation and planning for discharge  - Provide patient education as appropriate  Outcome: Progressing  Goal: Maintains/Returns to pre admission functional level  Description:  INTERVENTIONS:  - Perform AM-PAC 6 Click Basic Mobility/ Daily Activity assessment daily.  - Set and communicate daily mobility goal to care team and patient/family/caregiver.   - Collaborate with rehabilitation services on mobility goals if consulted  - Perform Range of Motion 3 times a day.  - Reposition patient every 2 hours.  - Dangle patient 3 times a day  - Stand patient 3 times a day  - Ambulate patient 3 times a day  - Out of bed to chair 3 times a day   - Out of bed for meals 3 times a day  - Out of bed for toileting  - Record patient progress and toleration of activity level   Outcome: Progressing     Problem: DISCHARGE PLANNING  Goal: Discharge to home or other facility with appropriate resources  Description: INTERVENTIONS:  - Identify barriers to discharge w/patient and caregiver  - Arrange for needed discharge resources and transportation as appropriate  - Identify discharge learning needs (meds, wound care, etc.)  - Arrange for interpretive services to assist at discharge as needed  - Refer to Case Management Department for coordinating discharge planning if the patient needs post-hospital services based on physician/advanced practitioner order or complex needs related to functional status, cognitive ability, or social support system  Outcome: Progressing     Problem: Knowledge Deficit  Goal: Patient/family/caregiver demonstrates understanding of disease process, treatment plan, medications, and discharge instructions  Description: Complete learning assessment and assess knowledge base.  Interventions:  - Provide teaching at level of understanding  - Provide teaching via preferred learning methods  Outcome: Progressing     Problem: Prexisting or High Potential for Compromised Skin Integrity  Goal: Skin integrity is maintained or improved  Description: INTERVENTIONS:  - Identify patients at risk for skin breakdown  - Assess and monitor skin integrity  - Assess and monitor nutrition and hydration  status  - Monitor labs   - Assess for incontinence   - Turn and reposition patient  - Assist with mobility/ambulation  - Relieve pressure over bony prominences  - Avoid friction and shearing  - Provide appropriate hygiene as needed including keeping skin clean and dry  - Evaluate need for skin moisturizer/barrier cream  - Collaborate with interdisciplinary team   - Patient/family teaching  - Consider wound care consult   Outcome: Progressing     Problem: Nutrition/Hydration-ADULT  Goal: Nutrient/Hydration intake appropriate for improving, restoring or maintaining nutritional needs  Description: Monitor and assess patient's nutrition/hydration status for malnutrition. Collaborate with interdisciplinary team and initiate plan and interventions as ordered.  Monitor patient's weight and dietary intake as ordered or per policy. Utilize nutrition screening tool and intervene as necessary. Determine patient's food preferences and provide high-protein, high-caloric foods as appropriate.     INTERVENTIONS:  - Monitor oral intake, urinary output, labs, and treatment plans  - Assess nutrition and hydration status and recommend course of action  - Evaluate amount of meals eaten  - Assist patient with eating if necessary   - Allow adequate time for meals  - Recommend/ encourage appropriate diets, oral nutritional supplements, and vitamin/mineral supplements  - Order, calculate, and assess calorie counts as needed  - Recommend, monitor, and adjust tube feedings and TPN/PPN based on assessed needs  - Assess need for intravenous fluids  - Provide specific nutrition/hydration education as appropriate  - Include patient/family/caregiver in decisions related to nutrition  Outcome: Progressing

## 2024-03-22 NOTE — CASE MANAGEMENT
[facility] has received APPROVED authorization.  Insurance: [id number]   Called in by Rep: Shazia SANTACRUZ#: [phone]  Authorization received for: SNF  Facility: [facility]    Authorization #: [id number]  Start of Care: 03/22  Next Review Date: 2 Business Days   Submit next review to F#: [phone]     Care Manager notified: Juan Rai

## 2024-03-22 NOTE — CASE MANAGEMENT
Case Management Discharge Planning Note    Patient name Cira Iqbal  Location /-01 MRN [id number]  : 1935 Date 3/22/2024       Current Admission Date: 3/18/2024  Current Admission Diagnosis:Generalized weakness   Patient Active Problem List    Diagnosis Date Noted    Generalized weakness 2024    New onset a-fib (HCC) 2024    Sacral wound 2024    Cellulitis of right hand 2020    Cellulitis of left wrist 2020    Hypertension 2020    GERD (gastroesophageal reflux disease) 2020    CHF (congestive heart failure) (HCC) 2017      LOS (days): 3  Geometric Mean LOS (GMLOS) (days): 2.3  Days to GMLOS:-0.7     OBJECTIVE:  Risk of Unplanned Readmission Score: 10.08      Current admission status: Inpatient   Preferred Pharmacy:   [facility] [place] - [address] [address]  Phone: [phone] Fax: [phone]    Primary Care Provider: Nely Bustillos MD  Primary Insurance: [facility] REP  Secondary Insurance: [place] [employer] AND [employer] [place] [facility]    DISCHARGE DETAILS:    Discharge planning discussed with:: Manny Downs  Freedom of Choice: Yes  Comments - Freedom of Choice: FOC maintained - CM notified of auth obtained.  Family in agreement with d/c to [facility] today.  No additional needs at this time.  CM to arrange transport.  CM contacted family/caregiver?: Yes  Were Treatment Team discharge recommendations reviewed with patient/caregiver?: Yes  Did patient/caregiver verbalize understanding of patient care needs?: N/A- going to facility  Were patient/caregiver advised of the risks associated with not following Treatment Team discharge recommendations?: Yes    Contacts  Patient Contacts: Manny Downs  Relationship to Patient:: Family  Contact Method: In Person  Reason/Outcome: Continuity of Care, Discharge Planning    Requested Home Health Care         Is the patient interested in [facility] at  discharge?: No    DME Referral Provided  Referral made for DME?: No    Other Referral/Resources/Interventions Provided:  Interventions: Transportation, Short Term Rehab, SNF  Referral Comments: Auth obtained for d/c to [facility] - STR to [facility].  Transport coordinated via [employer].  Hunter harding due to assist x 2.  Programs:: CHF  Government Services:: [employer] (NFCE faxed to [facility] this morning.  [phone])    Would you like to participate in our [facility] service program?  : No - Declined    Treatment Team Recommendation: SNF  Discharge Destination Plan:: SNF  Transport at Discharge : Hunter harding  Dispatcher Contacted: Yes  Number/Name of Dispatcher: [facility] [phone]     ETA of Transport (Date): 03/22/24  Transfer Mode: Stretcher  Accompanied by: EMS personnel     IMM Given (Date):: 03/22/24  IMM Given to:: Family  Family notified:: Verbal review of IMM with son.  Understanding confirmed.  No questions or concerns.  Agreeable to d/c today.  Original to medical records.

## 2024-03-22 NOTE — CASE MANAGEMENT
Case Management Discharge Planning Note    Patient name Cira Iqbal  Location /- MRN [id number]  : 1935 Date 3/22/2024       Current Admission Date: 3/18/2024  Current Admission Diagnosis:Generalized weakness   Patient Active Problem List    Diagnosis Date Noted    Generalized weakness 2024    New onset a-fib (HCC) 2024    Sacral wound 2024    Cellulitis of right hand 2020    Cellulitis of left wrist 2020    Hypertension 2020    GERD (gastroesophageal reflux disease) 2020    CHF (congestive heart failure) ([facility]) 2017      LOS (days): 3  Geometric Mean LOS (GMLOS) (days): 2.3  Days to GMLOS:-0.7     OBJECTIVE:  Risk of Unplanned Readmission Score: 10.08         Current admission status: Inpatient   Preferred Pharmacy:   [facility] AT [facility] [place], [place] - [address] [address]  Phone: [phone] Fax: [phone]    Primary Care Provider: Nely Bustillos MD    Primary Insurance: [employer]  REP  Secondary Insurance: [place] [employer] AND [email or web address] [facility]    DISCHARGE DETAILS:                                                                                                               Facility Insurance Auth Number: [id number]

## 2024-03-23 LAB
BACTERIA BLD CULT: NORMAL
BACTERIA BLD CULT: NORMAL

## 2024-03-26 NOTE — UTILIZATION REVIEW
NOTIFICATION OF ADMISSION DISCHARGE   This is a Notification of Discharge from [facility]. Please be advised that this patient has been discharge from our facility. Below you will find the admission and discharge date and time including the patient’s disposition.   UTILIZATION REVIEW CONTACT:  Yumiko Thompson  Utilization   Network Utilization Review Department  Phone: [phone] x carefully listen to the prompts. All voicemails are confidential.  Email: [email or web address]     ADMISSION INFORMATION  PRESENTATION DATE: 3/18/2024  3:01 PM  OBERVATION ADMISSION DATE: 3/18/24  INPATIENT ADMISSION DATE: 3/19/24  2:35 PM   DISCHARGE DATE: 3/22/2024  6:30 PM   DISPOSITION:Discharged/Transferred to Long Term Care/Personal Care Home/Assisted Living    Network Utilization Review Department  ATTENTION: Please call with any questions or concerns to [phone] and carefully listen to the prompts so that you are directed to the right person. All voicemails are confidential.   For Discharge needs, contact Care Management DC Support Team at [phone] opt. 2  Send all requests for admission clinical reviews, approved or denied determinations and any other requests to dedicated fax number below belonging to the campus where the patient is receiving treatment. List of dedicated fax numbers for the Facilities:  FACILITY NAME UR FAX NUMBER   ADMISSION DENIALS (Administrative/Medical Necessity) [phone]   DISCHARGE SUPPORT TEAM ([facility]) [phone]   PARENT CHILD HEALTH (Maternity/NICU/Pediatrics) [phone]   [facility] [phone]   [facility] [phone]   Critical access hospital [phone]   [facility] [phone]   [facility] [phone]   [facility] [phone]   [facility]  [phone]   [facility] [phone]   [facility] [phone]   [facility] [phone]   [facility] [phone]   [facility] [phone]